# Patient Record
Sex: FEMALE | Race: WHITE | NOT HISPANIC OR LATINO | Employment: OTHER | URBAN - METROPOLITAN AREA
[De-identification: names, ages, dates, MRNs, and addresses within clinical notes are randomized per-mention and may not be internally consistent; named-entity substitution may affect disease eponyms.]

---

## 2017-03-03 ENCOUNTER — TELEPHONE (OUTPATIENT)
Dept: RHEUMATOLOGY | Facility: PHYSICIAN GROUP | Age: 82
End: 2017-03-03

## 2017-03-03 DIAGNOSIS — M35.3 POLYMYALGIA RHEUMATICA (HCC): ICD-10-CM

## 2017-03-03 DIAGNOSIS — E03.9 HYPOTHYROIDISM, UNSPECIFIED TYPE: ICD-10-CM

## 2017-03-03 NOTE — TELEPHONE ENCOUNTER
Pt called this am. She is is scheduled to see you on Monday however she is hurting and would like you to order labs so that she can have them done today, so you will have the results on Monday. She is requesting and ESR and whatever else you would like.    Thank you in advance.

## 2017-03-04 ENCOUNTER — HOSPITAL ENCOUNTER (OUTPATIENT)
Dept: LAB | Facility: MEDICAL CENTER | Age: 82
End: 2017-03-04
Attending: INTERNAL MEDICINE
Payer: MEDICARE

## 2017-03-04 DIAGNOSIS — M35.3 POLYMYALGIA RHEUMATICA (HCC): ICD-10-CM

## 2017-03-04 DIAGNOSIS — E03.9 HYPOTHYROIDISM, UNSPECIFIED TYPE: ICD-10-CM

## 2017-03-04 LAB
ALBUMIN SERPL BCP-MCNC: 4 G/DL (ref 3.2–4.9)
ALBUMIN SERPL BCP-MCNC: 4.2 G/DL (ref 3.2–4.9)
ALBUMIN/GLOB SERPL: 1.3 G/DL
ALP SERPL-CCNC: 92 U/L (ref 30–99)
ALP SERPL-CCNC: 93 U/L (ref 30–99)
ALT SERPL-CCNC: 16 U/L (ref 2–50)
ALT SERPL-CCNC: 17 U/L (ref 2–50)
AMYLASE SERPL-CCNC: 51 U/L (ref 20–103)
ANION GAP SERPL CALC-SCNC: 7 MMOL/L (ref 0–11.9)
AST SERPL-CCNC: 18 U/L (ref 12–45)
AST SERPL-CCNC: 20 U/L (ref 12–45)
BASOPHILS # BLD AUTO: 0.05 K/UL (ref 0–0.12)
BASOPHILS # BLD AUTO: 0.06 K/UL (ref 0–0.12)
BASOPHILS NFR BLD AUTO: 1.3 % (ref 0–1.8)
BASOPHILS NFR BLD AUTO: 1.5 % (ref 0–1.8)
BILIRUB CONJ SERPL-MCNC: <0.1 MG/DL (ref 0.1–0.5)
BILIRUB INDIRECT SERPL-MCNC: NORMAL MG/DL (ref 0–1)
BILIRUB SERPL-MCNC: 0.4 MG/DL (ref 0.1–1.5)
BILIRUB SERPL-MCNC: 0.4 MG/DL (ref 0.1–1.5)
BUN SERPL-MCNC: 20 MG/DL (ref 8–22)
CALCIUM SERPL-MCNC: 9.9 MG/DL (ref 8.5–10.5)
CHLORIDE SERPL-SCNC: 103 MMOL/L (ref 96–112)
CO2 SERPL-SCNC: 25 MMOL/L (ref 20–33)
CREAT SERPL-MCNC: 0.84 MG/DL (ref 0.5–1.4)
EOSINOPHIL # BLD: 0.12 K/UL (ref 0–0.51)
EOSINOPHIL # BLD: 0.14 K/UL (ref 0–0.51)
EOSINOPHIL NFR BLD AUTO: 3 % (ref 0–6.9)
EOSINOPHIL NFR BLD AUTO: 3.6 % (ref 0–6.9)
ERYTHROCYTE [DISTWIDTH] IN BLOOD BY AUTOMATED COUNT: 45.1 FL (ref 35.9–50)
ERYTHROCYTE [DISTWIDTH] IN BLOOD BY AUTOMATED COUNT: 45.3 FL (ref 35.9–50)
ERYTHROCYTE [SEDIMENTATION RATE] IN BLOOD BY WESTERGREN METHOD: 13 MM/HOUR (ref 0–30)
GLOBULIN SER CALC-MCNC: 3.2 G/DL (ref 1.9–3.5)
GLUCOSE SERPL-MCNC: 80 MG/DL (ref 65–99)
HCT VFR BLD AUTO: 40.2 % (ref 37–47)
HCT VFR BLD AUTO: 40.4 % (ref 37–47)
HGB BLD-MCNC: 13.2 G/DL (ref 12–16)
HGB BLD-MCNC: 13.2 G/DL (ref 12–16)
IMM GRANULOCYTES # BLD AUTO: 0 K/UL (ref 0–0.11)
IMM GRANULOCYTES # BLD AUTO: 0.01 K/UL (ref 0–0.11)
IMM GRANULOCYTES NFR BLD AUTO: 0 % (ref 0–0.9)
IMM GRANULOCYTES NFR BLD AUTO: 0.3 % (ref 0–0.9)
LIPASE SERPL-CCNC: 35 U/L (ref 11–82)
LYMPHOCYTES # BLD: 1.23 K/UL (ref 1–4.8)
LYMPHOCYTES # BLD: 1.34 K/UL (ref 1–4.8)
LYMPHOCYTES NFR BLD AUTO: 31.8 % (ref 22–41)
LYMPHOCYTES NFR BLD AUTO: 34 % (ref 22–41)
MCH RBC QN AUTO: 30.6 PG (ref 27–33)
MCH RBC QN AUTO: 30.6 PG (ref 27–33)
MCHC RBC AUTO-ENTMCNC: 32.7 G/DL (ref 33.6–35)
MCHC RBC AUTO-ENTMCNC: 32.8 G/DL (ref 33.6–35)
MCV RBC AUTO: 93.1 FL (ref 81.4–97.8)
MCV RBC AUTO: 93.7 FL (ref 81.4–97.8)
MONOCYTES # BLD: 0.29 K/UL (ref 0–0.85)
MONOCYTES # BLD: 0.31 K/UL (ref 0–0.85)
MONOCYTES NFR BLD AUTO: 7.5 % (ref 0–13.4)
MONOCYTES NFR BLD AUTO: 7.9 % (ref 0–13.4)
NEUTROPHILS # BLD: 2.1 K/UL (ref 2–7.15)
NEUTROPHILS # BLD: 2.16 K/UL (ref 2–7.15)
NEUTROPHILS NFR BLD AUTO: 53.3 % (ref 44–72)
NEUTROPHILS NFR BLD AUTO: 55.8 % (ref 44–72)
NRBC # BLD AUTO: 0 K/UL
NRBC # BLD AUTO: 0 K/UL
NRBC BLD-RTO: 0 /100 WBC
NRBC BLD-RTO: 0 /100 WBC
PLATELET # BLD AUTO: 311 K/UL (ref 164–446)
PLATELET # BLD AUTO: 312 K/UL (ref 164–446)
PMV BLD AUTO: 10 FL (ref 9–12.9)
PMV BLD AUTO: 10.4 FL (ref 9–12.9)
POTASSIUM SERPL-SCNC: 4.3 MMOL/L (ref 3.6–5.5)
PROT SERPL-MCNC: 7.4 G/DL (ref 6–8.2)
PROT SERPL-MCNC: 7.6 G/DL (ref 6–8.2)
RBC # BLD AUTO: 4.31 M/UL (ref 4.2–5.4)
RBC # BLD AUTO: 4.32 M/UL (ref 4.2–5.4)
SODIUM SERPL-SCNC: 135 MMOL/L (ref 135–145)
TSH SERPL DL<=0.005 MIU/L-ACNC: 0.97 UIU/ML (ref 0.3–3.7)
URATE SERPL-MCNC: 3.7 MG/DL (ref 1.9–8.2)
WBC # BLD AUTO: 3.9 K/UL (ref 4.8–10.8)
WBC # BLD AUTO: 3.9 K/UL (ref 4.8–10.8)

## 2017-03-04 PROCEDURE — 36415 COLL VENOUS BLD VENIPUNCTURE: CPT

## 2017-03-04 PROCEDURE — 80076 HEPATIC FUNCTION PANEL: CPT

## 2017-03-04 PROCEDURE — 84550 ASSAY OF BLOOD/URIC ACID: CPT

## 2017-03-04 PROCEDURE — 82784 ASSAY IGA/IGD/IGG/IGM EACH: CPT

## 2017-03-04 PROCEDURE — 80053 COMPREHEN METABOLIC PANEL: CPT

## 2017-03-04 PROCEDURE — 83690 ASSAY OF LIPASE: CPT

## 2017-03-04 PROCEDURE — 85025 COMPLETE CBC W/AUTO DIFF WBC: CPT | Mod: 91

## 2017-03-04 PROCEDURE — 85025 COMPLETE CBC W/AUTO DIFF WBC: CPT

## 2017-03-04 PROCEDURE — 84443 ASSAY THYROID STIM HORMONE: CPT

## 2017-03-04 PROCEDURE — 85652 RBC SED RATE AUTOMATED: CPT

## 2017-03-04 PROCEDURE — 83516 IMMUNOASSAY NONANTIBODY: CPT

## 2017-03-04 PROCEDURE — 82150 ASSAY OF AMYLASE: CPT

## 2017-03-06 ENCOUNTER — OFFICE VISIT (OUTPATIENT)
Dept: RHEUMATOLOGY | Facility: PHYSICIAN GROUP | Age: 82
End: 2017-03-06
Payer: MEDICARE

## 2017-03-06 VITALS
HEART RATE: 88 BPM | BODY MASS INDEX: 20.83 KG/M2 | TEMPERATURE: 98.6 F | SYSTOLIC BLOOD PRESSURE: 122 MMHG | OXYGEN SATURATION: 96 % | DIASTOLIC BLOOD PRESSURE: 80 MMHG | RESPIRATION RATE: 14 BRPM | WEIGHT: 133 LBS

## 2017-03-06 DIAGNOSIS — G89.29 CHRONIC BILATERAL LOW BACK PAIN WITHOUT SCIATICA: ICD-10-CM

## 2017-03-06 DIAGNOSIS — M54.2 CERVICAL SPINE PAIN: ICD-10-CM

## 2017-03-06 DIAGNOSIS — M85.80 OSTEOPENIA: ICD-10-CM

## 2017-03-06 DIAGNOSIS — M54.50 CHRONIC BILATERAL LOW BACK PAIN WITHOUT SCIATICA: ICD-10-CM

## 2017-03-06 DIAGNOSIS — E03.9 HYPOTHYROIDISM, UNSPECIFIED TYPE: ICD-10-CM

## 2017-03-06 DIAGNOSIS — M15.9 PRIMARY OSTEOARTHRITIS INVOLVING MULTIPLE JOINTS: ICD-10-CM

## 2017-03-06 LAB
IGA SERPL-MCNC: 96 MG/DL (ref 68–408)
TTG IGA SER IA-ACNC: 0 U/ML (ref 0–3)

## 2017-03-06 PROCEDURE — G8484 FLU IMMUNIZE NO ADMIN: HCPCS | Performed by: INTERNAL MEDICINE

## 2017-03-06 PROCEDURE — 1101F PT FALLS ASSESS-DOCD LE1/YR: CPT | Mod: 8P | Performed by: INTERNAL MEDICINE

## 2017-03-06 PROCEDURE — G8432 DEP SCR NOT DOC, RNG: HCPCS | Performed by: INTERNAL MEDICINE

## 2017-03-06 PROCEDURE — 4040F PNEUMOC VAC/ADMIN/RCVD: CPT | Mod: 8P | Performed by: INTERNAL MEDICINE

## 2017-03-06 PROCEDURE — 99214 OFFICE O/P EST MOD 30 MIN: CPT | Performed by: INTERNAL MEDICINE

## 2017-03-06 PROCEDURE — 1036F TOBACCO NON-USER: CPT | Performed by: INTERNAL MEDICINE

## 2017-03-06 PROCEDURE — G8419 CALC BMI OUT NRM PARAM NOF/U: HCPCS | Performed by: INTERNAL MEDICINE

## 2017-03-06 RX ORDER — CELECOXIB 100 MG/1
CAPSULE ORAL
Qty: 180 CAP | Refills: 0 | Status: SHIPPED | OUTPATIENT
Start: 2017-03-06 | End: 2017-05-23

## 2017-03-06 NOTE — Clinical Note
[facility]   [address], [address]  [place] [address]  Phone: [phone]  Fax: [phone]              Encounter Date: 3/6/2017    Dear Dr. Nobles ref. provider found,    It was a pleasure seeing your patient, Susan Menendez, on 3/6/2017. Diagnoses of Primary osteoarthritis involving multiple joints, Cervical spine pain, Chronic bilateral low back pain without sciatica, Hypothyroidism, unspecified type, and Osteopenia were pertinent to this visit.     Please find attached progress note which includes the history I obtained from Ms. Menendez, my physical examination findings, my impression and recommendations.      Once again, it was a pleasure participating in your patient's care.  Please feel free to contact me if you have any questions or if I can be of any further assistance to your patients.      Sincerely,    Zena Benavides M.D.  Electronically Signed          PROGRESS NOTE:  No notes on file

## 2017-03-06 NOTE — MR AVS SNAPSHOT
Susan FROYLAN Menendez   3/6/2017 10:00 AM   Office Visit   MRN: [id number]    Department:  Rheumatology Med [facility]   Dept Phone:  [phone]    Description:  Female : 1935   Provider:  Zena Benavides M.D.           Reason for Visit     Follow-Up           Allergies as of 3/6/2017     Allergen Noted Reactions    Amoxicillin 2012       Unknown reaction    Augmentin 2011       Stomach ache      Cipro Xr 11/15/2013       Levaquin 2011       Doesn't work      Morphine 2012       Pcn [Penicillins] 2011       unknown    Sulfa Drugs 2011         You were diagnosed with     Primary osteoarthritis involving multiple joints   [3061813]       Cervical spine pain   [339948]       Chronic bilateral low back pain without sciatica   [8655596]       Hypothyroidism, unspecified type   [5243895]       Osteopenia   [220496]         Vital Signs     Blood Pressure Pulse Temperature Respirations Weight Oxygen Saturation    122/80 mmHg 88 37 °C (98.6 °F) 14 60.328 kg (133 lb) 96%    Smoking Status                   Former Smoker           Basic Information     Date Of Birth Sex Race Ethnicity Preferred Language    1935 Female White Non- English      Your appointments     2017  9:15 AM   Follow Up Visit with Zena Benavides M.D.   [facility] (--)    [address] [place] [address] [id number] [phone]           You will be receiving a confirmation call a few days before your appointment from our automated call confirmation system.              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   2014 - Present    Diarrhea R19.7   2014 - Present    Hepatitis K75.9   2014 - Present    Hearing loss H91.90   2014 - Present    Varicose veins I86.8   2014 - Present      Health Maintenance        Date  Due Completion Dates    IMM DTaP/Tdap/Td Vaccine (1 - Tdap) 9/5/1956 ---    PAP SMEAR 9/5/1958 ---    COLONOSCOPY 9/5/1987 ---    IMM ZOSTER VACCINE 9/5/1997 ---    IMM PNEUMOCOCCAL 65+ (ADULT) LOW/MEDIUM RISK SERIES (1 of 2 - PCV13) 9/5/2002 ---    IMM INFLUENZA (1) 9/1/2016 ---    MAMMOGRAM 11/28/2017 11/28/2016, 11/24/2015, 11/4/2014, 8/14/2013, 3/16/2012, 5/5/2009, 5/5/2009, 3/24/2009, 1/22/2008, 1/22/2008, 12/26/2006, 11/21/2005, 11/19/2004    BONE DENSITY 11/8/2021 11/8/2016, 11/4/2014, 8/14/2013, 3/19/2012, 3/15/2004            Current Immunizations     No immunizations on file.      Below and/or attached are the medications your provider expects you to take. Review all of your home medications and newly ordered medications with your provider and/or pharmacist. Follow medication instructions as directed by your provider and/or pharmacist. Please keep your medication list with you and share with your provider. Update the information when medications are discontinued, doses are changed, or new medications (including over-the-counter products) are added; and carry medication information at all times in the event of emergency situations     Allergies:  AMOXICILLIN - (reactions not documented)     AUGMENTIN - (reactions not documented)     CIPRO XR - (reactions not documented)     LEVAQUIN - (reactions not documented)     MORPHINE - (reactions not documented)     PCN - (reactions not documented)     SULFA DRUGS - (reactions not documented)               Medications  Valid as of: March 06, 2017 - 10:30 AM    Generic Name Brand Name Tablet Size Instructions for use    B Complex Vitamins   Take  by mouth every day.        Carbamide Peroxide (Solution) DEBROX 6.5 % Place 6 Drops in left ear 2 times a day. Administer drops in both ears.        Celecoxib (Cap) CELEBREX 100 MG 1 tab po qday to bid prn joint pain        Cholecalciferol   Take  by mouth every day.        Denosumab (Solution) PROLIA 60 MG/ML Inject 60 mg as  instructed Once. Every 6 months   Indications: Osteoporosis        DULoxetine HCl (Cap DR Particles) CYMBALTA 60 MG Take 60 mg by mouth every day.        Estradiol   Insert  in vagina.        Glutathione   50 mg by Does not apply route 2 Times a Day.        Hyaluronic Acid-Vitamin C   Take  by mouth every day.        Ketoconazole (Tab) NIZORAL 200 MG Take 1 Tab by mouth every day.        Lysine   Take  by mouth every day.        Methenamine Hippurate (Tab) HIPPREX 1 GM Take 1 g by mouth 4 times a day.        Milk Thistle   Take  by mouth every day.        Multiple Vitamins-Minerals   Take  by mouth every day.        Multiple Vitamins-Minerals   Take  by mouth every day.        Nutritional Supplements   Take  by mouth every day.        Pentosan Polysulfate Sodium (Cap) ELMIRON 100 MG Take 100 mg by mouth 3 times a day before meals.        Probiotic Product (Chew Tab) REZYST IM  Take 1 Tab by mouth every day.        Thyroid (Tab) ARMOUR THYROID 30 MG Take 30 mg by mouth every day.        TraZODone HCl (Tab) DESYREL 100 MG Take 100 mg by mouth every evening.        ValACYclovir HCl (Tab) VALTREX 500 MG Take 500 mg by mouth 2 times a day.        .                 Medicines prescribed today were sent to:     [id number] - [place] - [address]    [address] [place] [address] [place] [address]    Phone: [phone] Fax: [phone]    Open 24 Hours?: No    [facility] - [place], [place] - [address]    [address] [facility] [address]    Phone: [phone] Fax: [phone]    Open 24 Hours?: No      Medication refill instructions:       If your prescription bottle indicates you have medication refills left, it is not necessary to call your provider’s office. Please contact your pharmacy and they will refill your medication.    If your prescription bottle indicates you do not have any refills left, you may request refills at any time through one of the following ways: The  online TrialReach system (except Urgent Care), by calling your provider’s office, or by asking your pharmacy to contact your provider’s office with a refill request. Medication refills are processed only during regular business hours and may not be available until the next business day. Your provider may request additional information or to have a follow-up visit with you prior to refilling your medication.   *Please Note: Medication refills are assigned a new Rx number when refilled electronically. Your pharmacy may indicate that no refills were authorized even though a new prescription for the same medication is available at the pharmacy. Please request the medicine by name with the pharmacy before contacting your provider for a refill.           TrialReach Access Code: [email or web address]  Expires: 4/3/2017  9:15 AM    TrialReach  A secure, online tool to manage your health information     [employer]’s TrialReach® is a secure, online tool that connects you to your personalized health information from the privacy of your home -- day or night - making it very easy for you to manage your healthcare. Once the activation process is completed, you can even access your medical information using the TrialReach mati, which is available for free in the Apple Mati store or Google Play store.     TrialReach provides the following levels of access (as shown below):   My Chart Features   [facility] Doctor [facility]  Specialists [facility]  Urgent  Care Non-[facility]  Primary Care  Doctor   Email your healthcare team securely and privately 24/7 X X X    Manage appointments: schedule your next appointment; view details of past/upcoming appointments X      Request prescription refills. X      View recent personal medical records, including lab and immunizations X X X X   View health record, including health history, allergies, medications X X X X   Read reports about your outpatient visits, procedures, consult and ER notes X X X X   See your  discharge summary, which is a recap of your hospital and/or ER visit that includes your diagnosis, lab results, and care plan. X X       How to register for Code Kingdoms:  1. Go to  [email or web address].  2. Click on the Sign Up Now box, which takes you to the New Member Sign Up page. You will need to provide the following information:  a. Enter your Code Kingdoms Access Code exactly as it appears at the top of this page. (You will not need to use this code after you’ve completed the sign-up process. If you do not sign up before the expiration date, you must request a new code.)   b. Enter your date of birth.   c. Enter your home email address.   d. Click Submit, and follow the next screen’s instructions.  3. Create a Code Kingdoms ID. This will be your Code Kingdoms login ID and cannot be changed, so think of one that is secure and easy to remember.  4. Create a Code Kingdoms password. You can change your password at any time.  5. Enter your Password Reset Question and Answer. This can be used at a later time if you forget your password.   6. Enter your e-mail address. This allows you to receive e-mail notifications when new information is available in Code Kingdoms.  7. Click Sign Up. You can now view your health information.    For assistance activating your Code Kingdoms account, call [phone]
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
## 2017-04-06 ENCOUNTER — HOSPITAL ENCOUNTER (OUTPATIENT)
Dept: LAB | Facility: MEDICAL CENTER | Age: 82
End: 2017-04-06
Attending: FAMILY MEDICINE
Payer: MEDICARE

## 2017-04-06 LAB
T3 SERPL-MCNC: 176.2 NG/DL (ref 60–181)
T4 SERPL-MCNC: 5.5 UG/DL (ref 4–12)
TSH SERPL DL<=0.005 MIU/L-ACNC: 2.21 UIU/ML (ref 0.3–3.7)

## 2017-04-06 PROCEDURE — 84443 ASSAY THYROID STIM HORMONE: CPT

## 2017-04-06 PROCEDURE — 36415 COLL VENOUS BLD VENIPUNCTURE: CPT

## 2017-04-06 PROCEDURE — 84480 ASSAY TRIIODOTHYRONINE (T3): CPT

## 2017-04-06 PROCEDURE — 84436 ASSAY OF TOTAL THYROXINE: CPT

## 2017-05-09 ENCOUNTER — HOSPITAL ENCOUNTER (OUTPATIENT)
Facility: MEDICAL CENTER | Age: 82
End: 2017-05-09
Attending: FAMILY MEDICINE
Payer: MEDICARE

## 2017-05-09 ENCOUNTER — OFFICE VISIT (OUTPATIENT)
Dept: URGENT CARE | Facility: PHYSICIAN GROUP | Age: 82
End: 2017-05-09
Payer: MEDICARE

## 2017-05-09 VITALS
SYSTOLIC BLOOD PRESSURE: 120 MMHG | RESPIRATION RATE: 18 BRPM | DIASTOLIC BLOOD PRESSURE: 70 MMHG | HEART RATE: 100 BPM | OXYGEN SATURATION: 94 % | TEMPERATURE: 99.3 F

## 2017-05-09 DIAGNOSIS — R30.0 DYSURIA: ICD-10-CM

## 2017-05-09 LAB
APPEARANCE UR: CLEAR
BILIRUB UR STRIP-MCNC: NORMAL MG/DL
COLOR UR AUTO: YELLOW
GLUCOSE UR STRIP.AUTO-MCNC: NORMAL MG/DL
KETONES UR STRIP.AUTO-MCNC: NORMAL MG/DL
LEUKOCYTE ESTERASE UR QL STRIP.AUTO: NORMAL
NITRITE UR QL STRIP.AUTO: NORMAL
PH UR STRIP.AUTO: 5 [PH] (ref 5–8)
PROT UR QL STRIP: NORMAL MG/DL
RBC UR QL AUTO: NORMAL
SP GR UR STRIP.AUTO: 1.01
UROBILINOGEN UR STRIP-MCNC: NORMAL MG/DL

## 2017-05-09 PROCEDURE — 4040F PNEUMOC VAC/ADMIN/RCVD: CPT | Mod: 8P | Performed by: FAMILY MEDICINE

## 2017-05-09 PROCEDURE — G8432 DEP SCR NOT DOC, RNG: HCPCS | Performed by: FAMILY MEDICINE

## 2017-05-09 PROCEDURE — 1101F PT FALLS ASSESS-DOCD LE1/YR: CPT | Mod: 8P | Performed by: FAMILY MEDICINE

## 2017-05-09 PROCEDURE — G8420 CALC BMI NORM PARAMETERS: HCPCS | Performed by: FAMILY MEDICINE

## 2017-05-09 PROCEDURE — 99214 OFFICE O/P EST MOD 30 MIN: CPT | Performed by: FAMILY MEDICINE

## 2017-05-09 PROCEDURE — 1036F TOBACCO NON-USER: CPT | Performed by: FAMILY MEDICINE

## 2017-05-09 PROCEDURE — 87086 URINE CULTURE/COLONY COUNT: CPT

## 2017-05-09 PROCEDURE — 81002 URINALYSIS NONAUTO W/O SCOPE: CPT | Performed by: FAMILY MEDICINE

## 2017-05-09 RX ORDER — CEFUROXIME AXETIL 250 MG/1
250 TABLET ORAL 2 TIMES DAILY
COMMUNITY
End: 2017-05-09

## 2017-05-09 RX ORDER — CEFUROXIME AXETIL 250 MG/1
250 TABLET ORAL 2 TIMES DAILY
Qty: 10 TAB | Refills: 0 | Status: SHIPPED | OUTPATIENT
Start: 2017-05-09 | End: 2017-05-14

## 2017-05-09 RX ORDER — PHENAZOPYRIDINE HYDROCHLORIDE 100 MG/1
100 TABLET, FILM COATED ORAL 3 TIMES DAILY PRN
Qty: 6 TAB | Refills: 0 | Status: ON HOLD | OUTPATIENT
Start: 2017-05-09 | End: 2018-02-12

## 2017-05-09 ASSESSMENT — ENCOUNTER SYMPTOMS
ORTHOPNEA: 0
FEVER: 0
DIZZINESS: 0
VOMITING: 0
ABDOMINAL PAIN: 0
FOCAL WEAKNESS: 0
NAUSEA: 0
CHILLS: 0

## 2017-05-09 NOTE — MR AVS SNAPSHOT
Susan Menendez   2017 5:00 PM   Office Visit   MRN: [id number]    Department:  [facility]   Dept Phone:  [phone]    Description:  Female : 1935   Provider:  Cesar Valadez M.D.           Reason for Visit     UTI x1wk/ pain when urinating/ frequency/ lower back pain      Allergies as of 2017     Allergen Noted Reactions    Amoxicillin 2012       Unknown reaction    Augmentin 2011       Stomach ache      Cipro Xr 11/15/2013       Levaquin 2011       Doesn't work      Morphine 2012       Pcn [Penicillins] 2011       unknown    Sulfa Drugs 2011         You were diagnosed with     Dysuria   [788.1.ICD-9-CM]         Vital Signs     Blood Pressure Pulse Temperature Respirations Oxygen Saturation Smoking Status    120/70 mmHg 100 37.4 °C (99.3 °F) 18 94% Former Smoker      Basic Information     Date Of Birth Sex Race Ethnicity Preferred Language    1935 Female White Non- English      Your appointments     2017  9:15 AM   Follow Up Visit with Zena Benavides M.D.   [facility] (--)    [address], [address]  [facility] [place] [address] [phone]           You will be receiving a confirmation call a few days before your appointment from our automated call confirmation system.              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   2014 - Present    Diarrhea R19.7   2014 - Present    Hepatitis K75.9   2014 - Present    Hearing loss H91.90   2014 - Present    Varicose veins I86.8   2014 - Present      Health Maintenance        Date Due Completion Dates    IMM DTaP/Tdap/Td Vaccine (1 - Tdap) 1956 ---    PAP SMEAR 1958 ---    COLONOSCOPY 1987 ---    IMM ZOSTER VACCINE 1997 ---    IMM PNEUMOCOCCAL 65+ (ADULT) LOW/MEDIUM RISK SERIES (1 of 2  - PCV13) 9/5/2002 ---    MAMMOGRAM 11/28/2017 11/28/2016, 11/24/2015, 11/4/2014, 8/14/2013, 3/16/2012, 5/5/2009, 5/5/2009, 3/24/2009, 1/22/2008, 1/22/2008, 12/26/2006, 11/21/2005, 11/19/2004    BONE DENSITY 11/8/2021 11/8/2016, 11/4/2014, 8/14/2013, 3/19/2012, 3/15/2004            Current Immunizations     No immunizations on file.      Below and/or attached are the medications your provider expects you to take. Review all of your home medications and newly ordered medications with your provider and/or pharmacist. Follow medication instructions as directed by your provider and/or pharmacist. Please keep your medication list with you and share with your provider. Update the information when medications are discontinued, doses are changed, or new medications (including over-the-counter products) are added; and carry medication information at all times in the event of emergency situations     Allergies:  AMOXICILLIN - (reactions not documented)     AUGMENTIN - (reactions not documented)     CIPRO XR - (reactions not documented)     LEVAQUIN - (reactions not documented)     MORPHINE - (reactions not documented)     PCN - (reactions not documented)     SULFA DRUGS - (reactions not documented)               Medications  Valid as of: May 09, 2017 -  5:27 PM    Generic Name Brand Name Tablet Size Instructions for use    B Complex Vitamins   Take  by mouth every day.        Carbamide Peroxide (Solution) DEBROX 6.5 % Place 6 Drops in left ear 2 times a day. Administer drops in both ears.        Cefuroxime Axetil (Tab) CEFTIN 250 MG Take 1 Tab by mouth 2 times a day for 5 days.        Celecoxib (Cap) CELEBREX 100 MG 1 tab po qday to bid prn joint pain        Cholecalciferol   Take  by mouth every day.        Denosumab (Solution) PROLIA 60 MG/ML Inject 60 mg as instructed Once. Every 6 months   Indications: Osteoporosis        DULoxetine HCl (Cap DR Particles) CYMBALTA 60 MG Take 60 mg by mouth every day.        Estradiol    Insert  in vagina.        Glutathione   50 mg by Does not apply route 2 Times a Day.        Hyaluronic Acid-Vitamin C   Take  by mouth every day.        Ketoconazole (Tab) NIZORAL 200 MG Take 1 Tab by mouth every day.        Lysine   Take  by mouth every day.        Methenamine Hippurate (Tab) HIPPREX 1 GM Take 1 g by mouth 4 times a day.        Milk Thistle   Take  by mouth every day.        Multiple Vitamins-Minerals   Take  by mouth every day.        Multiple Vitamins-Minerals   Take  by mouth every day.        Nutritional Supplements   Take  by mouth every day.        Pentosan Polysulfate Sodium (Cap) ELMIRON 100 MG Take 100 mg by mouth 3 times a day before meals.        Phenazopyridine HCl (Tab) PYRIDIUM 100 MG Take 1 Tab by mouth 3 times a day as needed.        Probiotic Product (Chew Tab) REZYST IM  Take 1 Tab by mouth every day.        Thyroid (Tab) ARMOUR THYROID 30 MG Take 30 mg by mouth every day.        TraZODone HCl (Tab) DESYREL 100 MG Take 100 mg by mouth every evening.        ValACYclovir HCl (Tab) VALTREX 500 MG Take 500 mg by mouth 2 times a day.        .                 Medicines prescribed today were sent to:     [facility]/PHARMACY #[id number] - [place] - [address]    [address] [place] [address]    Phone: [phone] Fax: [phone]    Open 24 Hours?: No    [facility] - [place], [place] - [address]    [address] [facility] [address]    Phone: [phone] Fax: [phone]    Open 24 Hours?: No      Medication refill instructions:       If your prescription bottle indicates you have medication refills left, it is not necessary to call your provider’s office. Please contact your pharmacy and they will refill your medication.    If your prescription bottle indicates you do not have any refills left, you may request refills at any time through one of the following ways: The online [employer] system (except Urgent Care), by calling your provider’s  office, or by asking your pharmacy to contact your provider’s office with a refill request. Medication refills are processed only during regular business hours and may not be available until the next business day. Your provider may request additional information or to have a follow-up visit with you prior to refilling your medication.   *Please Note: Medication refills are assigned a new Rx number when refilled electronically. Your pharmacy may indicate that no refills were authorized even though a new prescription for the same medication is available at the pharmacy. Please request the medicine by name with the pharmacy before contacting your provider for a refill.        Your To Do List     Future Labs/Procedures Complete By Expires    URINE CULTURE(NEW)  As directed 5/9/2018         [employer] Access Code: Activation code not generated  Current [employer] Status: Active

## 2017-05-10 DIAGNOSIS — R30.0 DYSURIA: ICD-10-CM

## 2017-05-10 NOTE — PROGRESS NOTES
Subjective:      uSsan Menendez is a 81 y.o. female who presents with UTI    Chief Complaint   Patient presents with   • UTI     x1wk/ pain when urinating/ frequency/ lower back pain        - This is a very pleasant 81 y.o. female with complaints of urinary req burning x 1 week. No NVFC/cp/sob           ALLERGIES:  Amoxicillin; Augmentin; Cipro xr; Levaquin; Morphine; Pcn; and Sulfa drugs     PMH:  Past Medical History   Diagnosis Date   • Hypothyroid    • Chronic UTI    • Arthritis    • Other specified disorder of intestines    • Urinary bladder disorder    • Pain    • Anesthesia      nausea   • Depression    • Chronic diarrhea    • Varicose veins    • History of cataract surgery      left   • Interstitial cystitis         MEDS:    Current outpatient prescriptions:   •  phenazopyridine (PYRIDIUM) 100 MG Tab, Take 1 Tab by mouth 3 times a day as needed., Disp: 6 Tab, Rfl: 0  •  cefUROXime (CEFTIN) 250 MG Tab, Take 1 Tab by mouth 2 times a day for 5 days., Disp: 10 Tab, Rfl: 0  •  celecoxib (CELEBREX) 100 MG Cap, 1 tab po qday to bid prn joint pain, Disp: 180 Cap, Rfl: 0  •  methenamine hip (HIPPREX) 1 GM Tab, Take 1 g by mouth 4 times a day., Disp: , Rfl:   •  thyroid (ARMOUR THYROID) 30 MG Tab, Take 30 mg by mouth every day., Disp: , Rfl:   •  carbamide peroxide (CARBAMOXIDE EAR DROPS) 6.5 % Solution, Place 6 Drops in left ear 2 times a day. Administer drops in both ears., Disp: 1 Bottle, Rfl: 0  •  ketoconazole (NIZORAL) 200 MG TABS, Take 1 Tab by mouth every day., Disp: 20 Tab, Rfl: 0  •  denosumab (PROLIA) 60 MG/ML SOLN, Inject 60 mg as instructed Once. Every 6 months   Indications: Osteoporosis, Disp: , Rfl:   •  GLUTATHIONE, 50 mg by Does not apply route 2 Times a Day., Disp: , Rfl:   •  Probiotic Product (REZYST IM) CHEW, Take 1 Tab by mouth every day., Disp: 30 Tab, Rfl: 3  •  duloxetine (CYMBALTA) 60 MG CPEP, Take 60 mg by mouth every day., Disp: , Rfl:   •  trazodone (DESYREL) 100 MG TABS, Take 100 mg  by mouth every evening., Disp: , Rfl:   •  Cholecalciferol (VITAMIN D PO), Take  by mouth every day., Disp: , Rfl:   •  Nutritional Supplements (DHEA PO), Take  by mouth every day., Disp: , Rfl:   •  Multiple Vitamins-Minerals (EYE VITAMINS PO), Take  by mouth every day., Disp: , Rfl:   •  B Complex Vitamins (VITAMIN B COMPLEX PO), Take  by mouth every day., Disp: , Rfl:   •  MILK THISTLE PO, Take  by mouth every day., Disp: , Rfl:   •  LYSINE PO, Take  by mouth every day., Disp: , Rfl:   •  Hyaluronic Acid-Vitamin C (HYALURONIC ACID PO), Take  by mouth every day., Disp: , Rfl:   •  Multiple Vitamins-Minerals (BONE SMART PO), Take  by mouth every day., Disp: , Rfl:   •  valacyclovir (VALTREX) 500 MG TABS, Take 500 mg by mouth 2 times a day., Disp: , Rfl:   •  pentosan (ELMIRON) 100 MG CAPS, Take 100 mg by mouth 3 times a day before meals., Disp: , Rfl:   •  Estradiol (VAGIFEM VA), Insert  in vagina., Disp: , Rfl:     ** Past medical, social, family and surgical history documented in HPI or under PMH/PSH/FH section, otherwise it is contributory **             HPI    Review of Systems   Constitutional: Negative for fever and chills.   Cardiovascular: Negative for chest pain and orthopnea.   Gastrointestinal: Negative for nausea, vomiting and abdominal pain.   Genitourinary: Positive for dysuria and frequency.   Neurological: Negative for dizziness and focal weakness.          Objective:     /70 mmHg  Pulse 100  Temp(Src) 37.4 °C (99.3 °F)  Resp 18  SpO2 94%     Physical Exam   Constitutional: She appears well-developed. No distress.   HENT:   Head: Normocephalic and atraumatic.   Eyes: Conjunctivae are normal.   Neck: Neck supple.   Cardiovascular: Regular rhythm.    No murmur heard.  Pulmonary/Chest: Effort normal.   Abdominal: Soft. There is no tenderness. There is no rebound and no guarding.   Neurological: She is alert. She exhibits normal muscle tone.   Skin: Skin is warm and dry.   Psychiatric: She has  a normal mood and affect. Judgment normal.   Nursing note and vitals reviewed.              Assessment/Plan:         1. UTI  phenazopyridine (PYRIDIUM) 100 MG Tab    URINE CULTURE(NEW)    cefUROXime (CEFTIN) 250 MG Tab             Dx & d/c instructions discussed w/ patient and/or family members. Follow up w/ Prvt Dr or here in 3-4 days if not getting better, sooner if needed,  ER if worse and UC/PCP unavailable.        Possible side effects (i.e. Rash, GI upset/constipation, sedation, elevation of BP or sugars) of any medications given discussed.

## 2017-05-12 LAB
BACTERIA UR CULT: NORMAL
SIGNIFICANT IND 70042: NORMAL
SOURCE SOURCE: NORMAL

## 2017-05-16 ENCOUNTER — TELEPHONE (OUTPATIENT)
Dept: RHEUMATOLOGY | Facility: PHYSICIAN GROUP | Age: 82
End: 2017-05-16

## 2017-05-16 DIAGNOSIS — R53.82 CHRONIC FATIGUE: ICD-10-CM

## 2017-05-16 DIAGNOSIS — M94.20 CHONDROMALACIA: ICD-10-CM

## 2017-05-16 NOTE — TELEPHONE ENCOUNTER
"I spoke with patient and she states she is not feeling \"Right\"  Increased fatigue w hx of auto immune Hep. She states she thinks her PMR is flaring.. She would like some lab work orders.   Thank you and please advise  "

## 2017-05-16 NOTE — TELEPHONE ENCOUNTER
Spoke with patient and relayed your message. I also scheduled her for an appt next week.   Thank you

## 2017-05-19 ENCOUNTER — HOSPITAL ENCOUNTER (OUTPATIENT)
Dept: LAB | Facility: MEDICAL CENTER | Age: 82
End: 2017-05-19
Attending: INTERNAL MEDICINE
Payer: MEDICARE

## 2017-05-19 DIAGNOSIS — R53.82 CHRONIC FATIGUE: ICD-10-CM

## 2017-05-19 DIAGNOSIS — M94.20 CHONDROMALACIA: ICD-10-CM

## 2017-05-19 LAB
ALBUMIN SERPL BCP-MCNC: 4.1 G/DL (ref 3.2–4.9)
ALBUMIN/GLOB SERPL: 1.4 G/DL
ALP SERPL-CCNC: 91 U/L (ref 30–99)
ALT SERPL-CCNC: 18 U/L (ref 2–50)
ANION GAP SERPL CALC-SCNC: 5 MMOL/L (ref 0–11.9)
AST SERPL-CCNC: 22 U/L (ref 12–45)
BASOPHILS # BLD AUTO: 0.7 % (ref 0–1.8)
BASOPHILS # BLD: 0.05 K/UL (ref 0–0.12)
BILIRUB SERPL-MCNC: 0.3 MG/DL (ref 0.1–1.5)
BUN SERPL-MCNC: 19 MG/DL (ref 8–22)
CALCIUM SERPL-MCNC: 9.3 MG/DL (ref 8.5–10.5)
CHLORIDE SERPL-SCNC: 99 MMOL/L (ref 96–112)
CO2 SERPL-SCNC: 26 MMOL/L (ref 20–33)
CREAT SERPL-MCNC: 0.93 MG/DL (ref 0.5–1.4)
EOSINOPHIL # BLD AUTO: 0.08 K/UL (ref 0–0.51)
EOSINOPHIL NFR BLD: 1.2 % (ref 0–6.9)
ERYTHROCYTE [DISTWIDTH] IN BLOOD BY AUTOMATED COUNT: 46.1 FL (ref 35.9–50)
ERYTHROCYTE [SEDIMENTATION RATE] IN BLOOD BY WESTERGREN METHOD: 18 MM/HOUR (ref 0–30)
GFR SERPL CREATININE-BSD FRML MDRD: 58 ML/MIN/1.73 M 2
GLOBULIN SER CALC-MCNC: 3 G/DL (ref 1.9–3.5)
GLUCOSE SERPL-MCNC: 89 MG/DL (ref 65–99)
HCT VFR BLD AUTO: 35.5 % (ref 37–47)
HGB BLD-MCNC: 11.9 G/DL (ref 12–16)
IMM GRANULOCYTES # BLD AUTO: 0.01 K/UL (ref 0–0.11)
IMM GRANULOCYTES NFR BLD AUTO: 0.1 % (ref 0–0.9)
LYMPHOCYTES # BLD AUTO: 1.92 K/UL (ref 1–4.8)
LYMPHOCYTES NFR BLD: 28.8 % (ref 22–41)
MCH RBC QN AUTO: 30.4 PG (ref 27–33)
MCHC RBC AUTO-ENTMCNC: 33.5 G/DL (ref 33.6–35)
MCV RBC AUTO: 90.6 FL (ref 81.4–97.8)
MONOCYTES # BLD AUTO: 0.54 K/UL (ref 0–0.85)
MONOCYTES NFR BLD AUTO: 8.1 % (ref 0–13.4)
NEUTROPHILS # BLD AUTO: 4.07 K/UL (ref 2–7.15)
NEUTROPHILS NFR BLD: 61.1 % (ref 44–72)
NRBC # BLD AUTO: 0 K/UL
NRBC BLD AUTO-RTO: 0 /100 WBC
PLATELET # BLD AUTO: 341 K/UL (ref 164–446)
PMV BLD AUTO: 10.1 FL (ref 9–12.9)
POTASSIUM SERPL-SCNC: 4.3 MMOL/L (ref 3.6–5.5)
PROT SERPL-MCNC: 7.1 G/DL (ref 6–8.2)
RBC # BLD AUTO: 3.92 M/UL (ref 4.2–5.4)
SODIUM SERPL-SCNC: 130 MMOL/L (ref 135–145)
WBC # BLD AUTO: 6.7 K/UL (ref 4.8–10.8)

## 2017-05-19 PROCEDURE — 36415 COLL VENOUS BLD VENIPUNCTURE: CPT

## 2017-05-19 PROCEDURE — 80053 COMPREHEN METABOLIC PANEL: CPT

## 2017-05-19 PROCEDURE — 85025 COMPLETE CBC W/AUTO DIFF WBC: CPT

## 2017-05-19 PROCEDURE — 85652 RBC SED RATE AUTOMATED: CPT

## 2017-05-23 ENCOUNTER — OFFICE VISIT (OUTPATIENT)
Dept: RHEUMATOLOGY | Facility: PHYSICIAN GROUP | Age: 82
End: 2017-05-23
Payer: MEDICARE

## 2017-05-23 VITALS
HEART RATE: 78 BPM | BODY MASS INDEX: 20.98 KG/M2 | RESPIRATION RATE: 12 BRPM | WEIGHT: 134 LBS | DIASTOLIC BLOOD PRESSURE: 64 MMHG | TEMPERATURE: 99.3 F | SYSTOLIC BLOOD PRESSURE: 104 MMHG | OXYGEN SATURATION: 98 %

## 2017-05-23 DIAGNOSIS — M15.9 PRIMARY OSTEOARTHRITIS INVOLVING MULTIPLE JOINTS: ICD-10-CM

## 2017-05-23 DIAGNOSIS — M85.89 OSTEOPENIA OF MULTIPLE SITES: ICD-10-CM

## 2017-05-23 DIAGNOSIS — M54.2 CERVICALGIA: ICD-10-CM

## 2017-05-23 DIAGNOSIS — M79.7 FIBROMYALGIA: ICD-10-CM

## 2017-05-23 DIAGNOSIS — G89.29 CHRONIC BILATERAL LOW BACK PAIN WITHOUT SCIATICA: ICD-10-CM

## 2017-05-23 DIAGNOSIS — M54.50 CHRONIC BILATERAL LOW BACK PAIN WITHOUT SCIATICA: ICD-10-CM

## 2017-05-23 DIAGNOSIS — E03.9 HYPOTHYROIDISM, UNSPECIFIED TYPE: ICD-10-CM

## 2017-05-23 PROCEDURE — 4040F PNEUMOC VAC/ADMIN/RCVD: CPT | Mod: 8P | Performed by: INTERNAL MEDICINE

## 2017-05-23 PROCEDURE — 1101F PT FALLS ASSESS-DOCD LE1/YR: CPT | Mod: 8P | Performed by: INTERNAL MEDICINE

## 2017-05-23 PROCEDURE — G8432 DEP SCR NOT DOC, RNG: HCPCS | Performed by: INTERNAL MEDICINE

## 2017-05-23 PROCEDURE — 1036F TOBACCO NON-USER: CPT | Performed by: INTERNAL MEDICINE

## 2017-05-23 PROCEDURE — 99214 OFFICE O/P EST MOD 30 MIN: CPT | Performed by: INTERNAL MEDICINE

## 2017-05-23 PROCEDURE — G8420 CALC BMI NORM PARAMETERS: HCPCS | Performed by: INTERNAL MEDICINE

## 2017-05-23 RX ORDER — MELOXICAM 15 MG/1
TABLET ORAL
Qty: 90 TAB | Refills: 0 | Status: SHIPPED | OUTPATIENT
Start: 2017-05-23 | End: 2017-08-04 | Stop reason: SDUPTHER

## 2017-05-23 RX ORDER — GABAPENTIN 100 MG/1
CAPSULE ORAL
Qty: 90 CAP | Refills: 0 | Status: SHIPPED | OUTPATIENT
Start: 2017-05-23 | End: 2017-08-13 | Stop reason: SDUPTHER

## 2017-05-23 NOTE — MR AVS SNAPSHOT
Susan J Gemma   2017 9:15 AM   Office Visit   MRN: [id number]    Department:  [facility]   Dept Phone:  [phone]    Description:  Female : 1935   Provider:  Zena Benavides M.D.           Reason for Visit     Follow-Up follow up      Allergies as of 2017     Allergen Noted Reactions    Amoxicillin 2012       Unknown reaction    Augmentin 2011       Stomach ache      Cipro Xr 11/15/2013       Levaquin 2011       Doesn't work      Morphine 2012       Pcn [Penicillins] 2011       unknown    Sulfa Drugs 2011         You were diagnosed with     Primary osteoarthritis involving multiple joints   [5513789]       Osteopenia of multiple sites   [6651729]       Cervicalgia   [723.1.ICD-9-CM]       Chronic bilateral low back pain without sciatica   [8933145]       Hypothyroidism, unspecified type   [8816013]       Fibromyalgia   [051852]       Other specified forms of hearing loss, unspecified laterality   [4737609]         Vital Signs     Blood Pressure Pulse Temperature Respirations Weight Oxygen Saturation    104/64 mmHg 78 37.4 °C (99.3 °F) 12 60.782 kg (134 lb) 98%    Breastfeeding? Smoking Status                No Former Smoker          Basic Information     Date Of Birth Sex Race Ethnicity Preferred Language    1935 Female White Non- English      Your appointments     2017 11:15 AM   Follow Up Visit with Zena Benavides M.D.   [facility] (--)    [address] [place] [address], [address] [address] [phone]           You will be receiving a confirmation call a few days before your appointment from our automated call confirmation system.              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   2014 - Present    Diarrhea R19.7   2014 - Present    Hepatitis  K75.9   5/8/2014 - Present    Hearing loss H91.90   5/8/2014 - Present    Varicose veins I86.8   5/8/2014 - Present    Fibromyalgia M79.7   5/23/2017 - Present      Health Maintenance        Date Due Completion Dates    IMM DTaP/Tdap/Td Vaccine (1 - Tdap) 9/5/1956 ---    PAP SMEAR 9/5/1958 ---    COLONOSCOPY 9/5/1987 ---    IMM ZOSTER VACCINE 9/5/1997 ---    IMM PNEUMOCOCCAL 65+ (ADULT) LOW/MEDIUM RISK SERIES (1 of 2 - PCV13) 9/5/2002 ---    MAMMOGRAM 11/28/2017 11/28/2016, 11/24/2015, 11/4/2014, 8/14/2013, 3/16/2012, 5/5/2009, 5/5/2009, 3/24/2009, 1/22/2008, 1/22/2008, 12/26/2006, 11/21/2005, 11/19/2004    BONE DENSITY 11/8/2021 11/8/2016, 11/4/2014, 8/14/2013, 3/19/2012, 3/15/2004            Current Immunizations     No immunizations on file.      Below and/or attached are the medications your provider expects you to take. Review all of your home medications and newly ordered medications with your provider and/or pharmacist. Follow medication instructions as directed by your provider and/or pharmacist. Please keep your medication list with you and share with your provider. Update the information when medications are discontinued, doses are changed, or new medications (including over-the-counter products) are added; and carry medication information at all times in the event of emergency situations     Allergies:  AMOXICILLIN - (reactions not documented)     AUGMENTIN - (reactions not documented)     CIPRO XR - (reactions not documented)     LEVAQUIN - (reactions not documented)     MORPHINE - (reactions not documented)     PCN - (reactions not documented)     SULFA DRUGS - (reactions not documented)               Medications  Valid as of: May 23, 2017 -  9:54 AM    Generic Name Brand Name Tablet Size Instructions for use    B Complex Vitamins   Take  by mouth every day.        Carbamide Peroxide (Solution) DEBROX 6.5 % Place 6 Drops in left ear 2 times a day. Administer drops in both ears.        Cholecalciferol    Take  by mouth every day.        Denosumab (Solution) PROLIA 60 MG/ML Inject 60 mg as instructed Once. Every 6 months   Indications: Osteoporosis        DULoxetine HCl (Cap DR Particles) CYMBALTA 60 MG Take 60 mg by mouth every day.        Estradiol   Insert  in vagina.        Estradiol   Take  by mouth.        Gabapentin (Cap) NEURONTIN 100 MG 1 tab po qhs        Glutathione   50 mg by Does not apply route 2 Times a Day.        Hyaluronic Acid-Vitamin C   Take  by mouth every day.        Ketoconazole (Tab) NIZORAL 200 MG Take 1 Tab by mouth every day.        Lysine   Take  by mouth every day.        Meloxicam (Tab) MOBIC 15 MG 1 tab po qday with food for joint pain        Methenamine Hippurate (Tab) HIPPREX 1 GM Take 1 g by mouth 4 times a day.        Milk Thistle   Take  by mouth every day.        Multiple Vitamins-Minerals   Take  by mouth every day.        Multiple Vitamins-Minerals   Take  by mouth every day.        Nutritional Supplements   Take  by mouth every day.        Pentosan Polysulfate Sodium (Cap) ELMIRON 100 MG Take 100 mg by mouth 3 times a day before meals.        Phenazopyridine HCl (Tab) PYRIDIUM 100 MG Take 1 Tab by mouth 3 times a day as needed.        Probiotic Product (Chew Tab) REZYST IM  Take 1 Tab by mouth every day.        Thyroid (Tab) ARMOUR THYROID 30 MG Take 30 mg by mouth every day.        TraZODone HCl (Tab) DESYREL 100 MG Take 100 mg by mouth every evening.        ValACYclovir HCl (Tab) VALTREX 500 MG Take 500 mg by mouth 2 times a day.        .                 Medicines prescribed today were sent to:     [facility]/PHARMACY #[id number] - [place] - [address]    [address] [place] [address]    Phone: [phone] Fax: [phone]    Open 24 Hours?: No    [facility] - [place], [place] - [address]    [address] [facility] [address]    Phone: [phone] Fax: [phone]    Open 24 Hours?: No      Medication refill instructions:        If your prescription bottle indicates you have medication refills left, it is not necessary to call your provider’s office. Please contact your pharmacy and they will refill your medication.    If your prescription bottle indicates you do not have any refills left, you may request refills at any time through one of the following ways: The online [employer] system (except Urgent Care), by calling your provider’s office, or by asking your pharmacy to contact your provider’s office with a refill request. Medication refills are processed only during regular business hours and may not be available until the next business day. Your provider may request additional information or to have a follow-up visit with you prior to refilling your medication.   *Please Note: Medication refills are assigned a new Rx number when refilled electronically. Your pharmacy may indicate that no refills were authorized even though a new prescription for the same medication is available at the pharmacy. Please request the medicine by name with the pharmacy before contacting your provider for a refill.           [employer] Access Code: Activation code not generated  Current [employer] Status: Active

## 2017-05-23 NOTE — Clinical Note
[facility]   [address], [address]  [place] [address]  Phone: [phone]  Fax: [phone]              Encounter Date: 5/23/2017    Dear Dr. Nobles ref. provider found,    It was a pleasure seeing your patient, Susan Menendez, on 5/23/2017. Diagnoses of Primary osteoarthritis involving multiple joints, Osteopenia of multiple sites, Cervicalgia, Chronic bilateral low back pain without sciatica, Hypothyroidism, unspecified type, and Fibromyalgia were pertinent to this visit.     Please find attached progress note which includes the history I obtained from Ms. Menendez, my physical examination findings, my impression and recommendations.      Once again, it was a pleasure participating in your patient's care.  Please feel free to contact me if you have any questions or if I can be of any further assistance to your patients.      Sincerely,    Zena Benavides M.D.  Electronically Signed          PROGRESS NOTE:  No notes on file

## 2017-05-23 NOTE — PROGRESS NOTES
Chief Complaint- joint pain    Subjective:   Susan Menendez is a 81 y.o. female here today for follow up of rheumatological issues    This is a follow-up patient to this clinic,  previously followed by Dr Gutierrez for osteopenia/osteoporosis, patient also remote past had a history of polymyalgia rheumatica for which patient continues to follow-up on the regular basis for continued monitoring.  Patient denies any jaw claudication, denies any amaurosis fugax, does have some various aches and pains and does complain of lack of endurance that has been ongoing for many years. Patient states that she used to be quite active in sports, patient was last active in sports at 50 years of age about 30 years ago. Patient states at that time she had been diagnosed with fibromyalgia. Pain complaints for patient today is neck and back pain, status post MRI of lumbar sacral spine that showed degenerative disc disease, status post physical therapy of lumbar sacral spine and C-spine and also status post epidurals from her pain clinic.     Uric acid 3.4 2/2013  RF neg 2/2013  CCP neg 2/2015  MARLEE neg 2/2015  DEXA 10/2014 T scores -2.3, -1.2  DEXA 11/2016 T scores -0.8, -2.3          Current medicines (including changes today)  Current Outpatient Prescriptions   Medication Sig Dispense Refill   • Estradiol (ESTRACE PO) Take  by mouth.     • gabapentin (NEURONTIN) 100 MG Cap 1 tab po qhs 90 Cap 0   • meloxicam (MOBIC) 15 MG tablet 1 tab po qday with food for joint pain 90 Tab 0   • thyroid (ARMOUR THYROID) 30 MG Tab Take 30 mg by mouth every day.     • GLUTATHIONE 50 mg by Does not apply route 2 Times a Day.     • duloxetine (CYMBALTA) 60 MG CPEP Take 60 mg by mouth every day.     • trazodone (DESYREL) 100 MG TABS Take 100 mg by mouth every evening.     • Cholecalciferol (VITAMIN D PO) Take  by mouth every day.     • B Complex Vitamins (VITAMIN B COMPLEX PO) Take  by mouth every day.     • MILK THISTLE PO Take  by mouth every day.     •  LYSINE PO Take  by mouth every day.     • Hyaluronic Acid-Vitamin C (HYALURONIC ACID PO) Take  by mouth every day.     • Multiple Vitamins-Minerals (BONE SMART PO) Take  by mouth every day.     • Estradiol (VAGIFEM VA) Insert  in vagina.     • phenazopyridine (PYRIDIUM) 100 MG Tab Take 1 Tab by mouth 3 times a day as needed. 6 Tab 0   • methenamine hip (HIPPREX) 1 GM Tab Take 1 g by mouth 4 times a day.     • carbamide peroxide (CARBAMOXIDE EAR DROPS) 6.5 % Solution Place 6 Drops in left ear 2 times a day. Administer drops in both ears. 1 Bottle 0   • ketoconazole (NIZORAL) 200 MG TABS Take 1 Tab by mouth every day. 20 Tab 0   • denosumab (PROLIA) 60 MG/ML SOLN Inject 60 mg as instructed Once. Every 6 months   Indications: Osteoporosis     • Probiotic Product (REZYST IM) CHEW Take 1 Tab by mouth every day. 30 Tab 3   • Nutritional Supplements (DHEA PO) Take  by mouth every day.     • Multiple Vitamins-Minerals (EYE VITAMINS PO) Take  by mouth every day.     • valacyclovir (VALTREX) 500 MG TABS Take 500 mg by mouth 2 times a day.     • pentosan (ELMIRON) 100 MG CAPS Take 100 mg by mouth 3 times a day before meals.       No current facility-administered medications for this visit.     She  has a past medical history of Hypothyroid; Chronic UTI; Arthritis; Other specified disorder of intestines; Urinary bladder disorder; Pain; Anesthesia; Depression; Chronic diarrhea; Varicose veins; History of cataract surgery; and Interstitial cystitis.    ROS   Other than what is mentioned in HPI or physical exam, there is no history of headaches, double vision or blurred vision. No temporal tenderness or jaw claudication. No history of cataracts or glaucoma. No trouble swallowing difficulties or sore throats. No history of thyroid disease. No chest complaints including chest pain, cough or sputum production. No shortness of breath. No GI complaints including nausea, vomiting, change in bowel habits, or past peptic ulcer disease. No  history of blood in the stools. No urinary complaints including dysuria or frequency. No history of rash including psoriasis. No history of alopecia, photosensitivity, oral ulcerations, Raynaud's phenomena, or swollen joints. No history of gout. No back complaints. No history of low blood counts.       Objective:     Blood pressure 104/64, pulse 78, temperature 37.4 °C (99.3 °F), resp. rate 12, weight 60.782 kg (134 lb), SpO2 98 %, not currently breastfeeding. Body mass index is 20.98 kg/(m^2).   Physical Exam:  GENERAL: Thin  female Alert and oriented x 3, No apparent distress. SKIN: No inflammation, normal turgor, no rashes. HEENT: Atraumatic, unremarkable. PERRLA, extraocular movements are intact. Conjunctiva clear. Fundi not examined. Temporal arteries are palpable and non-tender. THROAT: Clear. NECK: Supple with good range of motion including flexion, extension, lateral rotation and bending. No JVD, thyromegaly or adenopathy is appreciated. Carotids are palpable, no bruits. CHEST: Clear to ausculation and percussion bilaterally, no rales or rhonchi. Respiratory efforts good. BREASTS: Not examined. COR: Regular rate and rhythm. No murmurs, rubs or gallops. ABDOMEN: Soft, non-tender, non-distended. Normal active bowel sounds. No organomegaly appreciated. No hepatomegaly. EXTREMITIES: No clubbing, cyanosis, edema. MUSCULOSKELETAL: Both shoulders have full range of motion including internal and external rotation and abduction. Both elbows have full range of motion without active active synovitis. The wrists have good range of motion without limitations. The small joints of the hands are unremarkable without significant swelling, patient does have some mild squaring of the thumb CMC joints with right worse than left and some mild tenderness along the right CMC joint. There are no tophi or nodules appreciated. The hips, knees, ankles and feet all have good range of motion. Low back is normal. There is no SI  tenderness to compression or palpation. There is good lumbar flexion. Patient does have tender points in the upper back and neck and shoulders and elbows bilaterally. RECTAL: Not done. : Not done. NEUROLOGICAL: Grossly intact. Motor and sensory examinations are normal. PULSES. Intact    Lab Results   Component Value Date/Time    SODIUM 130* 05/19/2017 03:27 PM    POTASSIUM 4.3 05/19/2017 03:27 PM    CHLORIDE 99 05/19/2017 03:27 PM    CO2 26 05/19/2017 03:27 PM    GLUCOSE 89 05/19/2017 03:27 PM    BUN 19 05/19/2017 03:27 PM    CREATININE 0.93 05/19/2017 03:27 PM      Lab Results   Component Value Date/Time    WBC 6.7 05/19/2017 03:27 PM    RBC 3.92* 05/19/2017 03:27 PM    HEMOGLOBIN 11.9* 05/19/2017 03:27 PM    HEMATOCRIT 35.5* 05/19/2017 03:27 PM    MCV 90.6 05/19/2017 03:27 PM    MCH 30.4 05/19/2017 03:27 PM    MCHC 33.5* 05/19/2017 03:27 PM    MPV 10.1 05/19/2017 03:27 PM    NEUTROPHILS-POLYS 61.10 05/19/2017 03:27 PM    LYMPHOCYTES 28.80 05/19/2017 03:27 PM    MONOCYTES 8.10 05/19/2017 03:27 PM    EOSINOPHILS 1.20 05/19/2017 03:27 PM    BASOPHILS 0.70 05/19/2017 03:27 PM      Lab Results   Component Value Date/Time    CALCIUM 9.3 05/19/2017 03:27 PM    AST(SGOT) 22 05/19/2017 03:27 PM    ALT(SGPT) 18 05/19/2017 03:27 PM    ALKALINE PHOSPHATASE 91 05/19/2017 03:27 PM    TOTAL BILIRUBIN 0.3 05/19/2017 03:27 PM    ALBUMIN 4.1 05/19/2017 03:27 PM    TOTAL PROTEIN 7.1 05/19/2017 03:27 PM     Lab Results   Component Value Date/Time    URIC ACID 3.7 03/04/2017 09:45 AM    RHEUMATOID FACTOR -NEPH- 8 02/27/2013 10:56 AM    CCP ANTIBODIES 3 02/27/2015 08:44 AM    ANTINUCLEAR ANTIBODY None Detected 02/27/2015 08:44 AM     Lab Results   Component Value Date/Time    SED RATE WESTERGREN 18 05/19/2017 03:27 PM     Lab Results   Component Value Date/Time    GLYCOHEMOGLOBIN 5.5 05/19/2014 09:25 AM     Lab Results   Component Value Date/Time    CPK TOTAL 84 11/30/2016 10:32 AM     Results for orders placed during the hospital  encounter of 11/08/16   DS-BONE DENSITY STUDY (DEXA)    Impression According to the World Health Organization classification, bone mineral density of this patient is osteopenia with increased fracture risk.        10-year Probability of Fracture:  Major Osteoporotic     23.8%  Hip     8.4%  Population      [place] ()    Based on left femur neck BMD          INTERPRETING LOCATION:  [address] [place] [address], [id number]     Results for orders placed during the hospital encounter of 06/09/10   DX-HAND 3+     Results for orders placed during the hospital encounter of 05/18/16   DX-WRIST-COMPLETE 3+ RIGHT    Impression Degenerative changes of the wrist and base of the thumb without definite acute osseous abnormality.     Results for orders placed during the hospital encounter of 10/10/16   MR-LUMBAR SPINE-W/O    Impression 1.  Mild grade 1 spondylolisthesis at the L4-5 level.    2.  Mild degenerative retrolisthesis at the L1-2 level with moderate discal and marked endplate degenerative changes.    3.  Mild multilevel lumbar spondylotic change.    4.  Small 5 mm synovial cyst adjacent to the medial aspect of the left L4-5 facet joint.    5.  Mild to moderate neural foraminal narrowing at the L4-5 and L5-S1 levels.     Results for orders placed during the hospital encounter of 06/09/10   DX-CERVICAL SPINE-2 OR 3 VIEWS     Assessment and Plan:     1. Primary osteoarthritis involving multiple joints  We will switch Celebrex to meloxicam 15 mg by mouth daily with food to see if this might help, also reemphasized the use of holistic medicine i.e. tumeric for which patient knows about but had forgotten to try.  - gabapentin (NEURONTIN) 100 MG Cap; 1 tab po qhs  Dispense: 90 Cap; Refill: 0  - meloxicam (MOBIC) 15 MG tablet; 1 tab po qday with food for joint pain  Dispense: 90 Tab; Refill: 0    2. Osteopenia of multiple sites  Last DEXA in November 2016 X DEXA November 2018   continue calcium 1200 mg by mouth daily and vitamin D  about 1000 units by mouth daily and magnesium 400 mg by mouth daily  - gabapentin (NEURONTIN) 100 MG Cap; 1 tab po qhs  Dispense: 90 Cap; Refill: 0  - meloxicam (MOBIC) 15 MG tablet; 1 tab po qday with food for joint pain  Dispense: 90 Tab; Refill: 0    3. Cervicalgia  Refer back to pain management for which patient gets epidurals, patient believes it's [facility]  - gabapentin (NEURONTIN) 100 MG Cap; 1 tab po qhs  Dispense: 90 Cap; Refill: 0  - meloxicam (MOBIC) 15 MG tablet; 1 tab po qday with food for joint pain  Dispense: 90 Tab; Refill: 0    4. Chronic bilateral low back pain without sciatica  Patient to follow-up with [facility]    5. Hypothyroidism, unspecified type  Can exacerbate joint pain, I recommend monitoring thyroid on a regular basis to assure it is not contributing to the patient's joint pain    6. Fibromyalgia  Do trial of low-dose gabapentin 100 mg by mouth daily at bedtime.  - gabapentin (NEURONTIN) 100 MG Cap; 1 tab po qhs  Dispense: 90 Cap; Refill: 0  - meloxicam (MOBIC) 15 MG tablet; 1 tab po qday with food for joint pain  Dispense: 90 Tab; Refill: 0    Followup: Return in about 2 months (around 7/23/2017). or sooner prn    Patient was seen 30 minutes face-to-face of which more than 50% of the time was spent counseling the patient (excluding time for procedures)  regarding  rheumatological condition and care. Therapy was discussed in detail.    Please note that this dictation was created using voice recognition software. I have made every reasonable attempt to correct obvious errors, but I expect that there are errors of grammar and possibly content that I did not discover before finalizing the note.

## 2017-06-07 ENCOUNTER — APPOINTMENT (OUTPATIENT)
Dept: RHEUMATOLOGY | Facility: PHYSICIAN GROUP | Age: 82
End: 2017-06-07
Payer: MEDICARE

## 2017-08-03 ENCOUNTER — APPOINTMENT (OUTPATIENT)
Dept: RHEUMATOLOGY | Facility: PHYSICIAN GROUP | Age: 82
End: 2017-08-03
Payer: MEDICARE

## 2017-08-04 ENCOUNTER — OFFICE VISIT (OUTPATIENT)
Dept: RHEUMATOLOGY | Facility: PHYSICIAN GROUP | Age: 82
End: 2017-08-04
Payer: MEDICARE

## 2017-08-04 VITALS
RESPIRATION RATE: 12 BRPM | DIASTOLIC BLOOD PRESSURE: 70 MMHG | SYSTOLIC BLOOD PRESSURE: 108 MMHG | BODY MASS INDEX: 20.19 KG/M2 | OXYGEN SATURATION: 97 % | HEART RATE: 75 BPM | WEIGHT: 133.2 LBS | HEIGHT: 68 IN | TEMPERATURE: 97.7 F

## 2017-08-04 DIAGNOSIS — M79.7 FIBROMYALGIA: ICD-10-CM

## 2017-08-04 DIAGNOSIS — D64.9 ANEMIA, UNSPECIFIED: ICD-10-CM

## 2017-08-04 DIAGNOSIS — M15.9 PRIMARY OSTEOARTHRITIS INVOLVING MULTIPLE JOINTS: ICD-10-CM

## 2017-08-04 DIAGNOSIS — M85.89 OSTEOPENIA OF MULTIPLE SITES: ICD-10-CM

## 2017-08-04 DIAGNOSIS — M54.2 CERVICALGIA: ICD-10-CM

## 2017-08-04 DIAGNOSIS — Z79.1 ENCOUNTER FOR LONG-TERM (CURRENT) USE OF NSAIDS: ICD-10-CM

## 2017-08-04 PROCEDURE — 99213 OFFICE O/P EST LOW 20 MIN: CPT | Performed by: INTERNAL MEDICINE

## 2017-08-04 RX ORDER — MELOXICAM 15 MG/1
TABLET ORAL
Qty: 90 TAB | Refills: 1 | Status: SHIPPED | OUTPATIENT
Start: 2017-08-04 | End: 2018-04-09 | Stop reason: SDUPTHER

## 2017-08-04 ASSESSMENT — PAIN SCALES - GENERAL: PAINLEVEL: 2=MINIMAL-SLIGHT

## 2017-08-04 NOTE — PROGRESS NOTES
Chief Complaint- joint pain    Subjective:   Susan Menendez is a 81 y.o. female here today for follow up of rheumatological issues    This is a follow-up patient to this clinic for DJD and osteopenia/osteoporosis. Remote history of PMR which is completely resolved. Previously followed by Dr Gutierrez.  Patient denies any jaw claudication, denies any amaurosis fugax, does have some various aches and pains and does complain of lack of endurance that has been ongoing for many years. Patient states that she used to be quite active in sports, patient was last active in sports at 50 years of age about 30 years ago. Patient states at that time she had been diagnosed with fibromyalgia. Patient without a lot of complaints today, last visit we switched patient from Celebrex to meloxicam a patient states it helps her quite a bit and would like to continue meloxicam. Status post MRI of lumbar sacral spine that showed degenerative disc disease, status post physical therapy of lumbar sacral spine and C-spine and also status post epidurals from her pain clinic.     Uric acid 3.4 2/2013  RF neg 2/2013  CCP neg 2/2015  MARLEE neg 2/2015  DEXA 10/2014 T scores -2.3, -1.2  DEXA 11/2016 T scores -0.8, -2.3    MRI LS spine 10/2016-indicates DJD and mild to moderate neural foraminal narrowing      Current medicines (including changes today)  Current Outpatient Prescriptions   Medication Sig Dispense Refill   • meloxicam (MOBIC) 15 MG tablet 1 tab po qday with food for joint pain 90 Tab 1   • Estradiol (ESTRACE PO) Take  by mouth.     • thyroid (ARMOUR THYROID) 30 MG Tab Take 30 mg by mouth every day.     • GLUTATHIONE 50 mg by Does not apply route 2 Times a Day.     • duloxetine (CYMBALTA) 60 MG CPEP Take 60 mg by mouth every day.     • trazodone (DESYREL) 100 MG TABS Take 100 mg by mouth every evening.     • Cholecalciferol (VITAMIN D PO) Take  by mouth every day.     • B Complex Vitamins (VITAMIN B COMPLEX PO) Take  by mouth every day.      • MILK THISTLE PO Take  by mouth every day.     • LYSINE PO Take  by mouth every day.     • Hyaluronic Acid-Vitamin C (HYALURONIC ACID PO) Take  by mouth every day.     • Multiple Vitamins-Minerals (BONE SMART PO) Take  by mouth every day.     • Estradiol (VAGIFEM VA) Insert  in vagina.     • gabapentin (NEURONTIN) 100 MG Cap 1 tab po qhs 90 Cap 0   • phenazopyridine (PYRIDIUM) 100 MG Tab Take 1 Tab by mouth 3 times a day as needed. 6 Tab 0   • methenamine hip (HIPPREX) 1 GM Tab Take 1 g by mouth 4 times a day.     • carbamide peroxide (CARBAMOXIDE EAR DROPS) 6.5 % Solution Place 6 Drops in left ear 2 times a day. Administer drops in both ears. 1 Bottle 0   • ketoconazole (NIZORAL) 200 MG TABS Take 1 Tab by mouth every day. 20 Tab 0   • denosumab (PROLIA) 60 MG/ML SOLN Inject 60 mg as instructed Once. Every 6 months   Indications: Osteoporosis     • Probiotic Product (REZYST IM) CHEW Take 1 Tab by mouth every day. 30 Tab 3   • Nutritional Supplements (DHEA PO) Take  by mouth every day.     • Multiple Vitamins-Minerals (EYE VITAMINS PO) Take  by mouth every day.     • valacyclovir (VALTREX) 500 MG TABS Take 500 mg by mouth 2 times a day.     • pentosan (ELMIRON) 100 MG CAPS Take 100 mg by mouth 3 times a day before meals.       No current facility-administered medications for this visit.     She  has a past medical history of Hypothyroid; Chronic UTI; Arthritis; Other specified disorder of intestines; Urinary bladder disorder; Pain; Anesthesia; Depression; Chronic diarrhea; Varicose veins; History of cataract surgery; and Interstitial cystitis.    ROS   Other than what is mentioned in HPI or physical exam, there is no history of headaches, double vision or blurred vision. No temporal tenderness or jaw claudication. No history of cataracts or glaucoma. No trouble swallowing difficulties or sore throats. No history of thyroid disease. No chest complaints including chest pain, cough or sputum production. No shortness  "of breath. No GI complaints including nausea, vomiting, change in bowel habits, or past peptic ulcer disease. No history of blood in the stools. No urinary complaints including dysuria or frequency. No history of rash including psoriasis. No history of alopecia, photosensitivity, oral ulcerations, Raynaud's phenomena, or swollen joints. No history of gout. No back complaints. No history of low blood counts.       Objective:     Blood pressure 108/70, pulse 75, temperature 36.5 °C (97.7 °F), resp. rate 12, height 1.715 m (5' 7.5\"), weight 60.419 kg (133 lb 3.2 oz), SpO2 97 %. Body mass index is 20.54 kg/(m^2).   Physical Exam:  GENERAL: Thin  female Alert and oriented x 3, No apparent distress. SKIN: No inflammation, normal turgor, no rashes. HEENT: Atraumatic, unremarkable. PERRLA, extraocular movements are intact. Conjunctiva clear. Fundi not examined. Temporal arteries are palpable and non-tender. THROAT: Clear. NECK: Supple with good range of motion including flexion, extension, lateral rotation and bending. No JVD, thyromegaly or adenopathy is appreciated. Carotids are palpable, no bruits. CHEST: Clear to ausculation and percussion bilaterally, no rales or rhonchi. Respiratory efforts good. BREASTS: Not examined. COR: Regular rate and rhythm. No murmurs, rubs or gallops. ABDOMEN: Soft, non-tender, non-distended. Normal active bowel sounds. No organomegaly appreciated. No hepatomegaly. EXTREMITIES: No clubbing, cyanosis, edema. MUSCULOSKELETAL: Both shoulders have full range of motion including internal and external rotation and abduction. Both elbows have full range of motion without active active synovitis. The wrists have good range of motion without limitations. The small joints of the hands are unremarkable without significant swelling, patient does have some mild squaring of the thumb CMC joints with right worse than left and some mild tenderness along the right CMC joint. There are no tophi or " nodules appreciated. The hips, knees, ankles and feet all have good range of motion. Low back is normal. There is no SI tenderness to compression or palpation. There is good lumbar flexion. Patient does have tender points in the upper back and neck and shoulders and elbows bilaterally. RECTAL: Not done. : Not done. NEUROLOGICAL: Grossly intact. Motor and sensory examinations are normal. PULSES. Intact    Lab Results   Component Value Date/Time    SODIUM 130* 05/19/2017 03:27 PM    POTASSIUM 4.3 05/19/2017 03:27 PM    CHLORIDE 99 05/19/2017 03:27 PM    CO2 26 05/19/2017 03:27 PM    GLUCOSE 89 05/19/2017 03:27 PM    BUN 19 05/19/2017 03:27 PM    CREATININE 0.93 05/19/2017 03:27 PM      Lab Results   Component Value Date/Time    WBC 6.7 05/19/2017 03:27 PM    RBC 3.92* 05/19/2017 03:27 PM    HEMOGLOBIN 11.9* 05/19/2017 03:27 PM    HEMATOCRIT 35.5* 05/19/2017 03:27 PM    MCV 90.6 05/19/2017 03:27 PM    MCH 30.4 05/19/2017 03:27 PM    MCHC 33.5* 05/19/2017 03:27 PM    MPV 10.1 05/19/2017 03:27 PM    NEUTROPHILS-POLYS 61.10 05/19/2017 03:27 PM    LYMPHOCYTES 28.80 05/19/2017 03:27 PM    MONOCYTES 8.10 05/19/2017 03:27 PM    EOSINOPHILS 1.20 05/19/2017 03:27 PM    BASOPHILS 0.70 05/19/2017 03:27 PM      Lab Results   Component Value Date/Time    CALCIUM 9.3 05/19/2017 03:27 PM    AST(SGOT) 22 05/19/2017 03:27 PM    ALT(SGPT) 18 05/19/2017 03:27 PM    ALKALINE PHOSPHATASE 91 05/19/2017 03:27 PM    TOTAL BILIRUBIN 0.3 05/19/2017 03:27 PM    ALBUMIN 4.1 05/19/2017 03:27 PM    TOTAL PROTEIN 7.1 05/19/2017 03:27 PM     Lab Results   Component Value Date/Time    URIC ACID 3.7 03/04/2017 09:45 AM    RHEUMATOID FACTOR -NEPH- 8 02/27/2013 10:56 AM    CCP ANTIBODIES 3 02/27/2015 08:44 AM    ANTINUCLEAR ANTIBODY None Detected 02/27/2015 08:44 AM     Lab Results   Component Value Date/Time    SED RATE WESTERGREN 18 05/19/2017 03:27 PM     Lab Results   Component Value Date/Time    GLYCOHEMOGLOBIN 5.5 05/19/2014 09:25 AM     Lab  Results   Component Value Date/Time    CPK TOTAL 84 11/30/2016 10:32 AM     Results for orders placed during the hospital encounter of 11/08/16   DS-BONE DENSITY STUDY (DEXA)    Impression According to the World Health Organization classification, bone mineral density of this patient is osteopenia with increased fracture risk.        10-year Probability of Fracture:  Major Osteoporotic     23.8%  Hip     8.4%  Population      [place] ()    Based on left femur neck BMD          INTERPRETING LOCATION:  [address] [place] [address], [id number]     Results for orders placed during the hospital encounter of 06/09/10   DX-HAND 3+     Results for orders placed during the hospital encounter of 05/18/16   DX-WRIST-COMPLETE 3+ RIGHT    Impression Degenerative changes of the wrist and base of the thumb without definite acute osseous abnormality.     Results for orders placed during the hospital encounter of 10/10/16   MR-LUMBAR SPINE-W/O    Impression 1.  Mild grade 1 spondylolisthesis at the L4-5 level.    2.  Mild degenerative retrolisthesis at the L1-2 level with moderate discal and marked endplate degenerative changes.    3.  Mild multilevel lumbar spondylotic change.    4.  Small 5 mm synovial cyst adjacent to the medial aspect of the left L4-5 facet joint.    5.  Mild to moderate neural foraminal narrowing at the L4-5 and L5-S1 levels.     Assessment and Plan:     1. Primary osteoarthritis involving multiple joints  Continue meloxicam 15 mg by mouth daily when necessary,  Labs to be done every 6 months, last labs in May 2017, next labs do November 2017  - meloxicam (MOBIC) 15 MG tablet; 1 tab po qday with food for joint pain  Dispense: 90 Tab; Refill: 1  - OCCULT BLOOD X3 (STOOL)    2. Osteopenia of multiple sites  Last DEXA November 2016, next DEXA November 2018   continue calcium 1200 mg by mouth daily and vitamin D about 1000 units by mouth daily and magnesium 400 mg by mouth daily  - meloxicam (MOBIC) 15 MG tablet; 1  tab po qday with food for joint pain  Dispense: 90 Tab; Refill: 1  - OCCULT BLOOD X3 (STOOL)    3. Anemia, unspecified  Slight anemia, recommend patient start plant iron, we will also check stool guaiac to assure no internal bleeding possibly from the NSAIDs  - meloxicam (MOBIC) 15 MG tablet; 1 tab po qday with food for joint pain  Dispense: 90 Tab; Refill: 1  - OCCULT BLOOD X3 (STOOL)    4. Encounter for long-term (current) use of NSAIDs  CBC and copper has a panel to be done every 6 months  - OCCULT BLOOD X3 (STOOL)    5. Cervicalgia  - meloxicam (MOBIC) 15 MG tablet; 1 tab po qday with food for joint pain  Dispense: 90 Tab; Refill: 1  - OCCULT BLOOD X3 (STOOL)    6. Fibromyalgia  Patient on gabapentin 100 mg by mouth daily at bedtime  - meloxicam (MOBIC) 15 MG tablet; 1 tab po qday with food for joint pain  Dispense: 90 Tab; Refill: 1  - OCCULT BLOOD X3 (STOOL)    Followup: Return in about 6 months (around 2/4/2018). or sooner prn    Patient was seen 30 minutes face-to-face of which more than 50% of the time was spent counseling the patient (excluding time for procedures)  regarding  rheumatological condition and care. Therapy was discussed in detail.    Please note that this dictation was created using voice recognition software. I have made every reasonable attempt to correct obvious errors, but I expect that there are errors of grammar and possibly content that I did not discover before finalizing the note.

## 2017-08-04 NOTE — MR AVS SNAPSHOT
"        Susan Crowedulce   2017 11:15 AM   Office Visit   MRN: [id number]    Department:  [facility]ology [facility]   Dept Phone:  [phone]    Description:  Female : 1935   Provider:  Zena Benavides M.D.           Reason for Visit     Follow-Up           Allergies as of 2017     Allergen Noted Reactions    Amoxicillin 2012       Unknown reaction    Augmentin 2011       Stomach ache      Cipro Xr 11/15/2013       Levaquin 2011       Doesn't work      Morphine 2012       Pcn [Penicillins] 2011       unknown    Sulfa Drugs 2011         You were diagnosed with     Primary osteoarthritis involving multiple joints   [2198227]       Osteopenia of multiple sites   [7507285]       Anemia, unspecified   [285.9.ICD-9-CM]       Encounter for long-term (current) use of NSAIDs   [453045]       Cervicalgia   [723.1.ICD-9-CM]       Fibromyalgia   [975086]         Vital Signs     Blood Pressure Pulse Temperature Respirations Height Weight    108/70 mmHg 75 36.5 °C (97.7 °F) 12 1.715 m (5' 7.5\") 60.419 kg (133 lb 3.2 oz)    Body Mass Index Oxygen Saturation Smoking Status             20.54 kg/m2 97% Former Smoker         Basic Information     Date Of Birth Sex Race Ethnicity Preferred Language    1935 Female White Non- English      Your appointments     2018 11:30 AM   Follow Up Visit with Zena Benavides M.D.   [facility] (--)    [address] [place] [id number] [phone]           You will be receiving a confirmation call a few days before your appointment from our automated call confirmation system.              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   2014 - Present    Diarrhea R19.7   2014 - Present    Hepatitis K75.9   2014 - Present    Hearing loss H91.90   " 5/8/2014 - Present    Varicose veins I86.8   5/8/2014 - Present    Fibromyalgia M79.7   5/23/2017 - Present      Health Maintenance        Date Due Completion Dates    IMM DTaP/Tdap/Td Vaccine (1 - Tdap) 9/5/1956 ---    PAP SMEAR 9/5/1958 ---    COLONOSCOPY 9/5/1987 ---    IMM ZOSTER VACCINE 9/5/1997 ---    IMM PNEUMOCOCCAL 65+ (ADULT) LOW/MEDIUM RISK SERIES (1 of 2 - PCV13) 9/5/2002 ---    IMM INFLUENZA (1) 9/1/2017 ---    MAMMOGRAM 11/28/2017 11/28/2016, 11/24/2015, 11/4/2014, 8/14/2013, 3/16/2012, 5/5/2009, 5/5/2009, 3/24/2009, 1/22/2008, 1/22/2008, 12/26/2006, 11/21/2005, 11/19/2004    BONE DENSITY 11/8/2021 11/8/2016, 11/4/2014, 8/14/2013, 3/19/2012, 3/15/2004            Current Immunizations     No immunizations on file.      Below and/or attached are the medications your provider expects you to take. Review all of your home medications and newly ordered medications with your provider and/or pharmacist. Follow medication instructions as directed by your provider and/or pharmacist. Please keep your medication list with you and share with your provider. Update the information when medications are discontinued, doses are changed, or new medications (including over-the-counter products) are added; and carry medication information at all times in the event of emergency situations     Allergies:  AMOXICILLIN - (reactions not documented)     AUGMENTIN - (reactions not documented)     CIPRO XR - (reactions not documented)     LEVAQUIN - (reactions not documented)     MORPHINE - (reactions not documented)     PCN - (reactions not documented)     SULFA DRUGS - (reactions not documented)               Medications  Valid as of: August 04, 2017 - 12:03 PM    Generic Name Brand Name Tablet Size Instructions for use    B Complex Vitamins   Take  by mouth every day.        Carbamide Peroxide (Solution) DEBROX 6.5 % Place 6 Drops in left ear 2 times a day. Administer drops in both ears.        Cholecalciferol   Take  by mouth  every day.        Denosumab (Solution) PROLIA 60 MG/ML Inject 60 mg as instructed Once. Every 6 months   Indications: Osteoporosis        DULoxetine HCl (Cap DR Particles) CYMBALTA 60 MG Take 60 mg by mouth every day.        Estradiol   Insert  in vagina.        Estradiol   Take  by mouth.        Gabapentin (Cap) NEURONTIN 100 MG 1 tab po qhs        Glutathione   50 mg by Does not apply route 2 Times a Day.        Hyaluronic Acid-Vitamin C   Take  by mouth every day.        Ketoconazole (Tab) NIZORAL 200 MG Take 1 Tab by mouth every day.        Lysine   Take  by mouth every day.        Meloxicam (Tab) MOBIC 15 MG 1 tab po qday with food for joint pain        Methenamine Hippurate (Tab) HIPPREX 1 GM Take 1 g by mouth 4 times a day.        Milk Thistle   Take  by mouth every day.        Multiple Vitamins-Minerals   Take  by mouth every day.        Multiple Vitamins-Minerals   Take  by mouth every day.        Nutritional Supplements   Take  by mouth every day.        Pentosan Polysulfate Sodium (Cap) ELMIRON 100 MG Take 100 mg by mouth 3 times a day before meals.        Phenazopyridine HCl (Tab) PYRIDIUM 100 MG Take 1 Tab by mouth 3 times a day as needed.        Probiotic Product (Chew Tab) REZYST IM  Take 1 Tab by mouth every day.        Thyroid (Tab) ARMOUR THYROID 30 MG Take 30 mg by mouth every day.        TraZODone HCl (Tab) DESYREL 100 MG Take 100 mg by mouth every evening.        ValACYclovir HCl (Tab) VALTREX 500 MG Take 500 mg by mouth 2 times a day.        .                 Medicines prescribed today were sent to:     [facility] #[id number] - [place] - [address].    [address] [place] [address]    Phone: [phone] Fax: [phone]    Open 24 Hours?: No    [facility] - [place], [place] - [address]    [address] [facility] [address]    Phone: [phone] Fax: [phone]    Open 24 Hours?: No      Medication refill instructions:       If your  prescription bottle indicates you have medication refills left, it is not necessary to call your provider’s office. Please contact your pharmacy and they will refill your medication.    If your prescription bottle indicates you do not have any refills left, you may request refills at any time through one of the following ways: The online [employer] system (except Urgent Care), by calling your provider’s office, or by asking your pharmacy to contact your provider’s office with a refill request. Medication refills are processed only during regular business hours and may not be available until the next business day. Your provider may request additional information or to have a follow-up visit with you prior to refilling your medication.   *Please Note: Medication refills are assigned a new Rx number when refilled electronically. Your pharmacy may indicate that no refills were authorized even though a new prescription for the same medication is available at the pharmacy. Please request the medicine by name with the pharmacy before contacting your provider for a refill.           [employer] Access Code: Activation code not generated  Current [employer] Status: Active

## 2017-08-04 NOTE — Clinical Note
[facility]   [address], [address]  [place] [address]  Phone: [phone]  Fax: [phone]              Encounter Date: 8/4/2017    Dear Dr. Nobles ref. provider found,    It was a pleasure seeing your patient, Susan Menendez, on 8/4/2017. Diagnoses of Primary osteoarthritis involving multiple joints, Osteopenia of multiple sites, Anemia, unspecified, Encounter for long-term (current) use of NSAIDs, Cervicalgia, and Fibromyalgia were pertinent to this visit.     Please find attached progress note which includes the history I obtained from Ms. Menendez, my physical examination findings, my impression and recommendations.      Once again, it was a pleasure participating in your patient's care.  Please feel free to contact me if you have any questions or if I can be of any further assistance to your patients.      Sincerely,    Zena Benavides M.D.  Electronically Signed          PROGRESS NOTE:  No notes on file

## 2017-08-11 ENCOUNTER — HOSPITAL ENCOUNTER (OUTPATIENT)
Facility: MEDICAL CENTER | Age: 82
End: 2017-08-11
Attending: INTERNAL MEDICINE
Payer: MEDICARE

## 2017-08-11 LAB — HEMOCCULT STL QL: NEGATIVE

## 2017-08-11 PROCEDURE — 82272 OCCULT BLD FECES 1-3 TESTS: CPT

## 2017-08-25 ENCOUNTER — HOSPITAL ENCOUNTER (OUTPATIENT)
Facility: MEDICAL CENTER | Age: 82
End: 2017-08-25
Attending: INTERNAL MEDICINE
Payer: MEDICARE

## 2017-08-25 LAB — HEMOCCULT STL QL: NEGATIVE

## 2017-08-25 PROCEDURE — 82272 OCCULT BLD FECES 1-3 TESTS: CPT | Mod: 91

## 2017-08-26 LAB — HEMOCCULT STL QL: NEGATIVE

## 2017-09-08 ENCOUNTER — HOSPITAL ENCOUNTER (OUTPATIENT)
Dept: LAB | Facility: MEDICAL CENTER | Age: 82
End: 2017-09-08
Attending: FAMILY MEDICINE
Payer: MEDICARE

## 2017-09-08 LAB
ALBUMIN SERPL BCP-MCNC: 3.9 G/DL (ref 3.2–4.9)
ALBUMIN/GLOB SERPL: 1.3 G/DL
ALP SERPL-CCNC: 85 U/L (ref 30–99)
ALT SERPL-CCNC: 14 U/L (ref 2–50)
ANION GAP SERPL CALC-SCNC: 7 MMOL/L (ref 0–11.9)
AST SERPL-CCNC: 21 U/L (ref 12–45)
BASOPHILS # BLD AUTO: 1.2 % (ref 0–1.8)
BASOPHILS # BLD: 0.05 K/UL (ref 0–0.12)
BILIRUB SERPL-MCNC: 0.4 MG/DL (ref 0.1–1.5)
BUN SERPL-MCNC: 21 MG/DL (ref 8–22)
CALCIUM SERPL-MCNC: 9.2 MG/DL (ref 8.5–10.5)
CHLORIDE SERPL-SCNC: 100 MMOL/L (ref 96–112)
CO2 SERPL-SCNC: 24 MMOL/L (ref 20–33)
CREAT SERPL-MCNC: 0.69 MG/DL (ref 0.5–1.4)
EOSINOPHIL # BLD AUTO: 0.07 K/UL (ref 0–0.51)
EOSINOPHIL NFR BLD: 1.7 % (ref 0–6.9)
ERYTHROCYTE [DISTWIDTH] IN BLOOD BY AUTOMATED COUNT: 49.2 FL (ref 35.9–50)
FERRITIN SERPL-MCNC: 53.8 NG/ML (ref 10–291)
GFR SERPL CREATININE-BSD FRML MDRD: >60 ML/MIN/1.73 M 2
GLOBULIN SER CALC-MCNC: 3.1 G/DL (ref 1.9–3.5)
GLUCOSE SERPL-MCNC: 75 MG/DL (ref 65–99)
HCT VFR BLD AUTO: 37.7 % (ref 37–47)
HGB BLD-MCNC: 12.3 G/DL (ref 12–16)
IMM GRANULOCYTES # BLD AUTO: 0.01 K/UL (ref 0–0.11)
IMM GRANULOCYTES NFR BLD AUTO: 0.2 % (ref 0–0.9)
IRON SATN MFR SERPL: 28 % (ref 15–55)
IRON SERPL-MCNC: 88 UG/DL (ref 40–170)
LYMPHOCYTES # BLD AUTO: 1.38 K/UL (ref 1–4.8)
LYMPHOCYTES NFR BLD: 34 % (ref 22–41)
MCH RBC QN AUTO: 30.8 PG (ref 27–33)
MCHC RBC AUTO-ENTMCNC: 32.6 G/DL (ref 33.6–35)
MCV RBC AUTO: 94.5 FL (ref 81.4–97.8)
MONOCYTES # BLD AUTO: 0.41 K/UL (ref 0–0.85)
MONOCYTES NFR BLD AUTO: 10.1 % (ref 0–13.4)
NEUTROPHILS # BLD AUTO: 2.14 K/UL (ref 2–7.15)
NEUTROPHILS NFR BLD: 52.8 % (ref 44–72)
NRBC # BLD AUTO: 0 K/UL
NRBC BLD AUTO-RTO: 0 /100 WBC
PLATELET # BLD AUTO: 324 K/UL (ref 164–446)
PMV BLD AUTO: 10.3 FL (ref 9–12.9)
POTASSIUM SERPL-SCNC: 4.4 MMOL/L (ref 3.6–5.5)
PROT SERPL-MCNC: 7 G/DL (ref 6–8.2)
RBC # BLD AUTO: 3.99 M/UL (ref 4.2–5.4)
SODIUM SERPL-SCNC: 131 MMOL/L (ref 135–145)
TIBC SERPL-MCNC: 311 UG/DL (ref 250–450)
TRANSFERRIN SERPL-MCNC: 223 MG/DL (ref 200–370)
WBC # BLD AUTO: 4.1 K/UL (ref 4.8–10.8)

## 2017-09-08 PROCEDURE — 83550 IRON BINDING TEST: CPT

## 2017-09-08 PROCEDURE — 85025 COMPLETE CBC W/AUTO DIFF WBC: CPT

## 2017-09-08 PROCEDURE — 36415 COLL VENOUS BLD VENIPUNCTURE: CPT

## 2017-09-08 PROCEDURE — 84466 ASSAY OF TRANSFERRIN: CPT

## 2017-09-08 PROCEDURE — 82728 ASSAY OF FERRITIN: CPT

## 2017-09-08 PROCEDURE — 83540 ASSAY OF IRON: CPT

## 2017-09-08 PROCEDURE — 80053 COMPREHEN METABOLIC PANEL: CPT

## 2017-09-26 ENCOUNTER — APPOINTMENT (OUTPATIENT)
Dept: RADIOLOGY | Facility: MEDICAL CENTER | Age: 82
End: 2017-09-26
Attending: NURSE PRACTITIONER
Payer: MEDICARE

## 2017-09-27 ENCOUNTER — HOSPITAL ENCOUNTER (OUTPATIENT)
Dept: RADIOLOGY | Facility: MEDICAL CENTER | Age: 82
End: 2017-09-27
Attending: NURSE PRACTITIONER
Payer: MEDICARE

## 2017-09-27 DIAGNOSIS — R10.9 ABDOMINAL PAIN, UNSPECIFIED SITE: ICD-10-CM

## 2017-09-27 PROCEDURE — 76700 US EXAM ABDOM COMPLETE: CPT

## 2017-12-07 ENCOUNTER — HOSPITAL ENCOUNTER (OUTPATIENT)
Dept: LAB | Facility: MEDICAL CENTER | Age: 82
End: 2017-12-07
Attending: FAMILY MEDICINE
Payer: MEDICARE

## 2017-12-07 LAB
BASOPHILS # BLD AUTO: 0.9 % (ref 0–1.8)
BASOPHILS # BLD: 0.04 K/UL (ref 0–0.12)
CRP SERPL HS-MCNC: 1.6 MG/L (ref 0–7.5)
EOSINOPHIL # BLD AUTO: 0.05 K/UL (ref 0–0.51)
EOSINOPHIL NFR BLD: 1.1 % (ref 0–6.9)
ERYTHROCYTE [DISTWIDTH] IN BLOOD BY AUTOMATED COUNT: 47.8 FL (ref 35.9–50)
ERYTHROCYTE [SEDIMENTATION RATE] IN BLOOD BY WESTERGREN METHOD: 10 MM/HOUR (ref 0–30)
FOLATE SERPL-MCNC: 8.6 NG/ML
HCT VFR BLD AUTO: 36.2 % (ref 37–47)
HGB BLD-MCNC: 12 G/DL (ref 12–16)
HIV 1+2 AB+HIV1 P24 AG SERPL QL IA: NON REACTIVE
IMM GRANULOCYTES # BLD AUTO: 0.01 K/UL (ref 0–0.11)
IMM GRANULOCYTES NFR BLD AUTO: 0.2 % (ref 0–0.9)
LYMPHOCYTES # BLD AUTO: 1.55 K/UL (ref 1–4.8)
LYMPHOCYTES NFR BLD: 34.5 % (ref 22–41)
MCH RBC QN AUTO: 31.3 PG (ref 27–33)
MCHC RBC AUTO-ENTMCNC: 33.1 G/DL (ref 33.6–35)
MCV RBC AUTO: 94.5 FL (ref 81.4–97.8)
MONOCYTES # BLD AUTO: 0.4 K/UL (ref 0–0.85)
MONOCYTES NFR BLD AUTO: 8.9 % (ref 0–13.4)
NEUTROPHILS # BLD AUTO: 2.44 K/UL (ref 2–7.15)
NEUTROPHILS NFR BLD: 54.4 % (ref 44–72)
NRBC # BLD AUTO: 0 K/UL
NRBC BLD AUTO-RTO: 0 /100 WBC
PLATELET # BLD AUTO: 302 K/UL (ref 164–446)
PMV BLD AUTO: 10.4 FL (ref 9–12.9)
RBC # BLD AUTO: 3.83 M/UL (ref 4.2–5.4)
TREPONEMA PALLIDUM IGG+IGM AB [PRESENCE] IN SERUM OR PLASMA BY IMMUNOASSAY: NON REACTIVE
TSH SERPL DL<=0.005 MIU/L-ACNC: 0.61 UIU/ML (ref 0.3–3.7)
VIT B12 SERPL-MCNC: 720 PG/ML (ref 211–911)
WBC # BLD AUTO: 4.5 K/UL (ref 4.8–10.8)

## 2017-12-07 PROCEDURE — 86141 C-REACTIVE PROTEIN HS: CPT

## 2017-12-07 PROCEDURE — 84443 ASSAY THYROID STIM HORMONE: CPT

## 2017-12-07 PROCEDURE — 85025 COMPLETE CBC W/AUTO DIFF WBC: CPT

## 2017-12-07 PROCEDURE — 85652 RBC SED RATE AUTOMATED: CPT

## 2017-12-07 PROCEDURE — 82746 ASSAY OF FOLIC ACID SERUM: CPT

## 2017-12-07 PROCEDURE — 82607 VITAMIN B-12: CPT

## 2017-12-07 PROCEDURE — 87086 URINE CULTURE/COLONY COUNT: CPT

## 2017-12-07 PROCEDURE — 36415 COLL VENOUS BLD VENIPUNCTURE: CPT

## 2017-12-07 PROCEDURE — G0475 HIV COMBINATION ASSAY: HCPCS

## 2017-12-07 PROCEDURE — 86780 TREPONEMA PALLIDUM: CPT

## 2017-12-09 LAB
BACTERIA UR CULT: NORMAL
SIGNIFICANT IND 70042: NORMAL
SITE SITE: NORMAL
SOURCE SOURCE: NORMAL

## 2017-12-11 LAB — TEST NAME 95000: NORMAL

## 2018-01-19 ENCOUNTER — HOSPITAL ENCOUNTER (OUTPATIENT)
Dept: RADIOLOGY | Facility: MEDICAL CENTER | Age: 83
End: 2018-01-19
Attending: FAMILY MEDICINE
Payer: MEDICARE

## 2018-01-19 DIAGNOSIS — Z12.31 VISIT FOR SCREENING MAMMOGRAM: ICD-10-CM

## 2018-01-19 PROCEDURE — 77067 SCR MAMMO BI INCL CAD: CPT

## 2018-02-06 DIAGNOSIS — Z01.810 PRE-OPERATIVE CARDIOVASCULAR EXAMINATION: ICD-10-CM

## 2018-02-06 DIAGNOSIS — Z01.812 PRE-OPERATIVE LABORATORY EXAMINATION: ICD-10-CM

## 2018-02-06 LAB
ANION GAP SERPL CALC-SCNC: 9 MMOL/L (ref 0–11.9)
BUN SERPL-MCNC: 18 MG/DL (ref 8–22)
CALCIUM SERPL-MCNC: 9.2 MG/DL (ref 8.5–10.5)
CHLORIDE SERPL-SCNC: 104 MMOL/L (ref 96–112)
CO2 SERPL-SCNC: 24 MMOL/L (ref 20–33)
CREAT SERPL-MCNC: 0.83 MG/DL (ref 0.5–1.4)
EKG IMPRESSION: NORMAL
ERYTHROCYTE [DISTWIDTH] IN BLOOD BY AUTOMATED COUNT: 46.9 FL (ref 35.9–50)
GLUCOSE SERPL-MCNC: 51 MG/DL (ref 65–99)
HCT VFR BLD AUTO: 35.9 % (ref 37–47)
HGB BLD-MCNC: 11.9 G/DL (ref 12–16)
MCH RBC QN AUTO: 31.1 PG (ref 27–33)
MCHC RBC AUTO-ENTMCNC: 33.1 G/DL (ref 33.6–35)
MCV RBC AUTO: 93.7 FL (ref 81.4–97.8)
PLATELET # BLD AUTO: 333 K/UL (ref 164–446)
PMV BLD AUTO: 10.3 FL (ref 9–12.9)
POTASSIUM SERPL-SCNC: 4.1 MMOL/L (ref 3.6–5.5)
RBC # BLD AUTO: 3.83 M/UL (ref 4.2–5.4)
SODIUM SERPL-SCNC: 137 MMOL/L (ref 135–145)
WBC # BLD AUTO: 5.5 K/UL (ref 4.8–10.8)

## 2018-02-06 PROCEDURE — 93010 ELECTROCARDIOGRAM REPORT: CPT | Performed by: INTERNAL MEDICINE

## 2018-02-06 PROCEDURE — 36415 COLL VENOUS BLD VENIPUNCTURE: CPT

## 2018-02-06 PROCEDURE — 85027 COMPLETE CBC AUTOMATED: CPT

## 2018-02-06 PROCEDURE — 80048 BASIC METABOLIC PNL TOTAL CA: CPT

## 2018-02-06 PROCEDURE — 93005 ELECTROCARDIOGRAM TRACING: CPT

## 2018-02-12 ENCOUNTER — HOSPITAL ENCOUNTER (OUTPATIENT)
Facility: MEDICAL CENTER | Age: 83
End: 2018-02-12
Attending: SURGERY | Admitting: SURGERY
Payer: MEDICARE

## 2018-02-12 VITALS
DIASTOLIC BLOOD PRESSURE: 67 MMHG | OXYGEN SATURATION: 94 % | SYSTOLIC BLOOD PRESSURE: 148 MMHG | RESPIRATION RATE: 16 BRPM | HEART RATE: 76 BPM | WEIGHT: 134.04 LBS | BODY MASS INDEX: 20.31 KG/M2 | HEIGHT: 68 IN | TEMPERATURE: 97.2 F

## 2018-02-12 DIAGNOSIS — G89.18 POSTOPERATIVE PAIN: ICD-10-CM

## 2018-02-12 PROCEDURE — 160025 RECOVERY II MINUTES (STATS): Performed by: SURGERY

## 2018-02-12 PROCEDURE — 160046 HCHG PACU - 1ST 60 MINS PHASE II: Performed by: SURGERY

## 2018-02-12 PROCEDURE — 501583 HCHG TROCAR, THRD CAN&SEAL 5X100: Performed by: SURGERY

## 2018-02-12 PROCEDURE — 501399 HCHG SPECIMAN BAG, ENDO CATC: Performed by: SURGERY

## 2018-02-12 PROCEDURE — 700101 HCHG RX REV CODE 250

## 2018-02-12 PROCEDURE — 700102 HCHG RX REV CODE 250 W/ 637 OVERRIDE(OP)

## 2018-02-12 PROCEDURE — 501838 HCHG SUTURE GENERAL: Performed by: SURGERY

## 2018-02-12 PROCEDURE — 501571 HCHG TROCAR, SEPARATOR 12X100: Performed by: SURGERY

## 2018-02-12 PROCEDURE — 160028 HCHG SURGERY MINUTES - 1ST 30 MINS LEVEL 3: Performed by: SURGERY

## 2018-02-12 PROCEDURE — 160035 HCHG PACU - 1ST 60 MINS PHASE I: Performed by: SURGERY

## 2018-02-12 PROCEDURE — 502571 HCHG PACK, LAP CHOLE: Performed by: SURGERY

## 2018-02-12 PROCEDURE — 160036 HCHG PACU - EA ADDL 30 MINS PHASE I: Performed by: SURGERY

## 2018-02-12 PROCEDURE — 700111 HCHG RX REV CODE 636 W/ 250 OVERRIDE (IP)

## 2018-02-12 PROCEDURE — 500697 HCHG HEMOCLIP, LARGE (ORANGE): Performed by: SURGERY

## 2018-02-12 PROCEDURE — A6402 STERILE GAUZE <= 16 SQ IN: HCPCS | Performed by: SURGERY

## 2018-02-12 PROCEDURE — 160048 HCHG OR STATISTICAL LEVEL 1-5: Performed by: SURGERY

## 2018-02-12 PROCEDURE — 160009 HCHG ANES TIME/MIN: Performed by: SURGERY

## 2018-02-12 PROCEDURE — 88304 TISSUE EXAM BY PATHOLOGIST: CPT

## 2018-02-12 PROCEDURE — 501570 HCHG TROCAR, SEPARATOR: Performed by: SURGERY

## 2018-02-12 PROCEDURE — 160002 HCHG RECOVERY MINUTES (STAT): Performed by: SURGERY

## 2018-02-12 PROCEDURE — A9270 NON-COVERED ITEM OR SERVICE: HCPCS

## 2018-02-12 PROCEDURE — 160039 HCHG SURGERY MINUTES - EA ADDL 1 MIN LEVEL 3: Performed by: SURGERY

## 2018-02-12 PROCEDURE — 500868 HCHG NEEDLE, SURGI(VARES): Performed by: SURGERY

## 2018-02-12 RX ORDER — LIDOCAINE HYDROCHLORIDE 10 MG/ML
0.5 INJECTION, SOLUTION INFILTRATION; PERINEURAL
Status: DISCONTINUED | OUTPATIENT
Start: 2018-02-12 | End: 2018-02-12 | Stop reason: HOSPADM

## 2018-02-12 RX ORDER — SODIUM CHLORIDE, SODIUM LACTATE, POTASSIUM CHLORIDE, CALCIUM CHLORIDE 600; 310; 30; 20 MG/100ML; MG/100ML; MG/100ML; MG/100ML
INJECTION, SOLUTION INTRAVENOUS CONTINUOUS
Status: DISCONTINUED | OUTPATIENT
Start: 2018-02-12 | End: 2018-02-12 | Stop reason: HOSPADM

## 2018-02-12 RX ORDER — LIDOCAINE AND PRILOCAINE 25; 25 MG/G; MG/G
1 CREAM TOPICAL
Status: DISCONTINUED | OUTPATIENT
Start: 2018-02-12 | End: 2018-02-12 | Stop reason: HOSPADM

## 2018-02-12 RX ORDER — LIDOCAINE HYDROCHLORIDE 10 MG/ML
INJECTION, SOLUTION INFILTRATION; PERINEURAL
Status: COMPLETED
Start: 2018-02-12 | End: 2018-02-12

## 2018-02-12 RX ORDER — BUPIVACAINE HYDROCHLORIDE AND EPINEPHRINE 5; 5 MG/ML; UG/ML
INJECTION, SOLUTION EPIDURAL; INTRACAUDAL; PERINEURAL
Status: DISCONTINUED | OUTPATIENT
Start: 2018-02-12 | End: 2018-02-12 | Stop reason: HOSPADM

## 2018-02-12 RX ORDER — OXYCODONE HCL 5 MG/5 ML
SOLUTION, ORAL ORAL
Status: COMPLETED
Start: 2018-02-12 | End: 2018-02-12

## 2018-02-12 RX ORDER — HYDROCODONE BITARTRATE AND ACETAMINOPHEN 5; 325 MG/1; MG/1
1-2 TABLET ORAL EVERY 6 HOURS PRN
Qty: 15 TAB | Refills: 0 | Status: SHIPPED | OUTPATIENT
Start: 2018-02-12 | End: 2018-02-15

## 2018-02-12 RX ADMIN — LIDOCAINE HYDROCHLORIDE 0.5 ML: 10 INJECTION, SOLUTION INFILTRATION; PERINEURAL at 09:45

## 2018-02-12 RX ADMIN — OXYCODONE HYDROCHLORIDE 5 MG: 5 SOLUTION ORAL at 11:40

## 2018-02-12 RX ADMIN — SODIUM CHLORIDE, SODIUM LACTATE, POTASSIUM CHLORIDE, CALCIUM CHLORIDE: 600; 310; 30; 20 INJECTION, SOLUTION INTRAVENOUS at 09:45

## 2018-02-12 ASSESSMENT — PAIN SCALES - GENERAL
PAINLEVEL_OUTOF10: 1
PAINLEVEL_OUTOF10: 0
PAINLEVEL_OUTOF10: ASSUMED PAIN PRESENT
PAINLEVEL_OUTOF10: 0

## 2018-02-12 NOTE — OP REPORT
DATE OF SERVICE:  02/12/2018    PREOPERATIVE DIAGNOSIS:  Biliary dyskinesia.    POSTOPERATIVE DIAGNOSIS:  Biliary dyskinesia.    PROCEDURE PERFORMED:  Laparoscopic cholecystectomy.    SURGEON:  John H. Ganser, MD    ASSISTANT:  Deondre Ghosh PA-C    ANESTHESIA:  General.    ANESTHESIOLOGIST:  Zack Nobles MD    INDICATIONS:  The patient is an 82-year-old female with long history of   epigastric complaints and workup shows biliary dyskinesia.  Risks, benefits,   and alternatives to laparoscopic cholecystectomy were outlined in detail.  All   questions answered and she wished to proceed.    DESCRIPTION OF PROCEDURE:  The patient was identified and general anesthetic   administered.  Her abdomen was prepped and draped in the usual sterile   fashion.  Local anesthesia of 0.5% Marcaine with epinephrine was injected   prior to making skin incision.  A small incision was made in the inferior   aspect of the umbilicus and the Veress needle passed.  The abdomen was   insufflated with carbon dioxide without incident and a 5-mm blunt trocar and   5-mm 30-degree scope inserted.  Epigastric 12 and 2 right subcostal 5 mm   trocars were placed.  The gallbladder was grasped, retracted upwards.  There   were adhesions to the wall and the gallbladder was markedly distended.    Adhesions taken down with cautery.  Circumferential dissection was carried   around the neck of the gallbladder and the cystic duct and cystic artery   dissected out and the anatomy clearly defined.  Two clips were placed   proximally and distally in both the duct and the artery and they were   transected.  The gallbladder was removed from liver surface with   electrocautery, placed in endoscopic bag and withdrawn through the epigastric   trocar site.    The abdomen was irrigated and hemostasis assured.  The trocars were withdrawn.    The abdomen deflated.  The epigastric trocar site was closed with fascial   level with 2-0 Vicryl and skin incisions with  4-0 Vicryl subcuticular sutures.    Sterile dressings were applied.  The patient returned to recovery room in   stable condition.       ____________________________________     JOHN H. GANSER, MD [facility] / PROMISE    DD:  02/12/2018 11:18:39  DT:  02/12/2018 11:47:01    D#:  [id number]  Job#:  [id number]    cc: TALIA TREJO MD, Waqas RAMEY

## 2018-02-12 NOTE — OR NURSING
D/c orders received. IV dc'd. Pt changed into clothing with assistance. Discharge instructions given; pt and family verbalized understanding and questions answered. Prescriptions with pt's son. Pt dc'd in w/c with hospital escort in stable condition.

## 2018-02-12 NOTE — OR SURGEON
Immediate Post OP Note    PreOp Diagnosis: Biliary dyskinesia    PostOp Diagnosis: Same    Procedure(s):  DOREEN BY LAPAROSCOPY - Wound Class: Clean Contaminated    Surgeon(s):  John H Ganser, M.D.    Anesthesiologist/Type of Anesthesia:  Anesthesiologist: Zack Nobles M.D./General    Surgical Staff:  Assistant: SHERMAN Horowitz  Circulator: Kat Chin RSUKHWINDER  Scrub Person: Padmini Andujar; Sameera Stuart    Specimens:  * No specimens in log *    Estimated Blood Loss: 0    Findings: 0    Complications: 0        2/12/2018 11:15 AM John H Ganser

## 2018-02-12 NOTE — OR NURSING
Pt A&OX4. VSS. Pt on room air. Four lap sites to abd, dressings CDI and ice pack in place. Pt has no complaints of nausea, tolerated sips of water. Pt states a little soreness but declines pain post pain medication admin. Script for Norco on chart. Report called to TERRANCE Burns. Pt transported via [place] to Phase II.

## 2018-02-12 NOTE — DISCHARGE INSTRUCTIONS
ACTIVITY: Rest and take it easy for the first 24 hours.  A responsible adult is recommended to remain with you during that time.  It is normal to feel sleepy.  We encourage you to not do anything that requires balance, judgment or coordination.    MILD FLU-LIKE SYMPTOMS ARE NORMAL. YOU MAY EXPERIENCE GENERALIZED MUSCLE ACHES, THROAT IRRITATION, HEADACHE AND/OR SOME NAUSEA.    FOR 24 HOURS DO NOT:  Drive, operate machinery or run household appliances.  Drink beer or alcoholic beverages.   Make important decisions or sign legal documents.    SPECIAL INSTRUCTIONS:   Pt counseled re: diet, activity, home med's, and wound care  Low fat, regular diet x 1-2 days; then advance diet as tolerated  May shower POD #1 over Tegaderms   Ok to remove Tegaderms on POD#4  No driving for 4-5 days  No lifting >15 lbs for 1-2 weeks  F/U with Dr. Ganser in 2 weeks    DIET: To avoid nausea, slowly advance diet as tolerated, avoiding spicy or greasy foods for the first day.  Add more substantial food to your diet according to your physician's instructions.  Babies can be fed formula or breast milk as soon as they are hungry.  INCREASE FLUIDS AND FIBER TO AVOID CONSTIPATION.    SURGICAL DRESSING/BATHING: see above    FOLLOW-UP APPOINTMENT:  A follow-up appointment should be arranged with your doctor in 1-2 weeks; call to schedule.    You should CALL YOUR PHYSICIAN if you develop:  Fever greater than 101 degrees F.  Pain not relieved by medication, or persistent nausea or vomiting.  Excessive bleeding (blood soaking through dressing) or unexpected drainage from the wound.  Extreme redness or swelling around the incision site, drainage of pus or foul smelling drainage.  Inability to urinate or empty your bladder within 8 hours.  Problems with breathing or chest pain.    You should call 911 if you develop problems with breathing or chest pain.  If you are unable to contact your doctor or surgical center, you should go to the nearest  emergency room or urgent care center.  Physician's telephone #: Ganser [phone]    If any questions arise, call your doctor.  If your doctor is not available, please feel free to call the Surgical Center at [phone].  The Center is open Monday through Friday from 7AM to 7PM.  You can also call the HEALTH HOTLINE open 24 hours/day, 7 days/week and speak to a nurse at [phone], or toll free at [phone].    A registered nurse may call you a few days after your surgery to see how you are doing after your procedure.    MEDICATIONS: Resume taking daily medication.  Take prescribed pain medication with food.  If no medication is prescribed, you may take non-aspirin pain medication if needed.  PAIN MEDICATION CAN BE VERY CONSTIPATING.  Take a stool softener or laxative such as senokot, pericolace, or milk of magnesia if needed.    Prescription given for norco.  Last pain medication given at 11:40 am.    If your physician has prescribed pain medication that includes Acetaminophen (Tylenol), do not take additional Acetaminophen (Tylenol) while taking the prescribed medication.    Depression / Suicide Risk    As you are discharged from this [facility] facility, it is important to learn how to keep safe from harming yourself.    Recognize the warning signs:  · Abrupt changes in personality, positive or negative- including increase in energy   · Giving away possessions  · Change in eating patterns- significant weight changes-  positive or negative  · Change in sleeping patterns- unable to sleep or sleeping all the time   · Unwillingness or inability to communicate  · Depression  · Unusual sadness, discouragement and loneliness  · Talk of wanting to die  · Neglect of personal appearance   · Rebelliousness- reckless behavior  · Withdrawal from people/activities they love  · Confusion- inability to concentrate     If you or a loved one observes any of these behaviors or has concerns about self-harm, here's  what you can do:  · Talk about it- your feelings and reasons for harming yourself  · Remove any means that you might use to hurt yourself (examples: pills, rope, extension cords, firearm)  · Get professional help from the community (Mental Health, Substance Abuse, psychological counseling)  · Do not be alone:Call your Safe Contact- someone whom you trust who will be there for you.  · Call your local CRISIS HOTLINE 268-9177 or 820-528-7158  · Call your local Children's Mobile Crisis Response Team [place] (955) 891-3846 or www.NatureBridge  · Call the toll free National Suicide Prevention Hotlines   · National Suicide Prevention Lifeline 147-900-PZXK (8105)  · National Hope Line Network 800-SUICIDE (846-2182)

## 2018-02-28 ENCOUNTER — TELEPHONE (OUTPATIENT)
Dept: RHEUMATOLOGY | Facility: PHYSICIAN GROUP | Age: 83
End: 2018-02-28

## 2018-02-28 NOTE — TELEPHONE ENCOUNTER
Please notify pt that we have refilled her gabapentin for one month  We need to have her switch this particular medication refill to her PCP for next refill however  For her next refill, please ask her PCP

## 2018-03-01 ENCOUNTER — OFFICE VISIT (OUTPATIENT)
Dept: RHEUMATOLOGY | Facility: PHYSICIAN GROUP | Age: 83
End: 2018-03-01
Payer: MEDICARE

## 2018-03-01 VITALS
WEIGHT: 134 LBS | RESPIRATION RATE: 14 BRPM | BODY MASS INDEX: 20.37 KG/M2 | DIASTOLIC BLOOD PRESSURE: 60 MMHG | OXYGEN SATURATION: 96 % | HEART RATE: 94 BPM | SYSTOLIC BLOOD PRESSURE: 120 MMHG | TEMPERATURE: 97.7 F

## 2018-03-01 DIAGNOSIS — D50.9 IRON DEFICIENCY ANEMIA, UNSPECIFIED IRON DEFICIENCY ANEMIA TYPE: ICD-10-CM

## 2018-03-01 DIAGNOSIS — Z79.1 ENCOUNTER FOR LONG-TERM (CURRENT) USE OF NSAIDS: ICD-10-CM

## 2018-03-01 DIAGNOSIS — M81.0 SENILE OSTEOPOROSIS: ICD-10-CM

## 2018-03-01 DIAGNOSIS — M15.9 PRIMARY OSTEOARTHRITIS INVOLVING MULTIPLE JOINTS: ICD-10-CM

## 2018-03-01 DIAGNOSIS — M79.7 FIBROMYALGIA: ICD-10-CM

## 2018-03-01 DIAGNOSIS — E03.9 HYPOTHYROIDISM, UNSPECIFIED TYPE: ICD-10-CM

## 2018-03-01 DIAGNOSIS — M54.2 CERVICALGIA: ICD-10-CM

## 2018-03-01 PROCEDURE — 99214 OFFICE O/P EST MOD 30 MIN: CPT | Performed by: INTERNAL MEDICINE

## 2018-03-01 RX ORDER — ALENDRONATE SODIUM 70 MG/1
TABLET ORAL
Qty: 12 TAB | Refills: 3 | Status: ON HOLD | OUTPATIENT
Start: 2018-03-01 | End: 2018-12-03

## 2018-03-01 NOTE — LETTER
[facility]   [address], [address]  [place] [address]  Phone: [phone]  Fax: [phone]              Encounter Date: 3/1/2018    Dear Dr. Nobles ref. provider found,    It was a pleasure seeing your patient, Susan Menendez, on 3/1/2018. Diagnoses of Primary osteoarthritis involving multiple joints, Encounter for long-term (current) use of NSAIDs, Senile osteoporosis, Iron deficiency anemia, unspecified iron deficiency anemia type, Hypothyroidism, unspecified type, Cervicalgia, and Fibromyalgia were pertinent to this visit.     Please find attached progress note which includes the history I obtained from Ms. Menendez, my physical examination findings, my impression and recommendations.      Once again, it was a pleasure participating in your patient's care.  Please feel free to contact me if you have any questions or if I can be of any further assistance to your patients.      Sincerely,    Zena Benavides M.D.  Electronically Signed          PROGRESS NOTE:  No notes on file

## 2018-03-01 NOTE — PROGRESS NOTES
Chief Complaint- joint pain    Subjective:   Susan Menendez is a 82 y.o. female here today for follow up of rheumatological issues    This is a follow-up patient to this clinic for DJD and osteopenia/osteoporosis. Remote history of PMR which is completely resolved. Previously followed by Dr Gutierrez.  Patient denies any jaw claudication, denies any amaurosis fugax, does have some various aches and pains and does complain of lack of endurance that has been ongoing for many years. Patient states that she used to be quite active in sports, patient was last active in sports at 50 years of age about 30 years ago. Patient states at that time she had been diagnosed with fibromyalgia. Patient continues her complaints of lack of endurance, recently status post gallbladder surgery 2 weeks ago, and is available more achy secondary to weather changes, patient currently on  meloxicam 15 mg by mouth daily when necessary. Of note ESR 12/2017=10    Status post MRI of lumbar sacral spine that showed degenerative disc disease, status post physical therapy of lumbar sacral spine and C-spine and also status post epidurals from her pain clinic, patient currently seeing Dr. Yoo pain clinic.      Uric acid 3.4 2/2013  RF neg 2/2013  CCP neg 2/2015  MARLEE neg 2/2015  DEXA 10/2014 T scores -2.3, -1.2  DEXA 11/8/2016 T scores -0.8, -2.3    FRAX 11/8/2016 major osteoporotic fracture risk is 23.8%, hip fracture risk 8.4%  MRI LS spine 10/2016-indicates DJD and mild to moderate neural foraminal narrowing     Current medicines (including changes today)  Current Outpatient Prescriptions   Medication Sig Dispense Refill   • Probiotic Product (ALIGN) Cap Take 1 Cap by mouth every day.     • Mirabegron ER (MYRBETRIQ) 50 MG TABLET SR 24 HR Take 1 Tab by mouth every day.     • gabapentin (NEURONTIN) 100 MG Cap TAKE ONE CAPSULE BY MOUTH AT BEDTIME 90 Cap 0   • meloxicam (MOBIC) 15 MG tablet 1 tab po qday with food for joint pain 90 Tab 1   •  Estradiol (ESTRACE PO) Take 1 Tab by mouth every evening.     • thyroid (ARMOUR THYROID) 30 MG Tab Take 30 mg by mouth every day.     • trazodone (DESYREL) 100 MG TABS Take 100 mg by mouth every evening.     • Cholecalciferol (VITAMIN D PO) Take 1 Tab by mouth every day.     • B Complex Vitamins (VITAMIN B COMPLEX PO) Take 1 Tab by mouth every day.     • LYSINE PO Take 1 Tab by mouth every day.     • pentosan (ELMIRON) 100 MG CAPS Take 100 mg by mouth 3 times a day before meals.     • Estradiol (VAGIFEM VA) Insert  in vagina See Admin Instructions. 3 X weekly     • gabapentin (NEURONTIN) 100 MG Cap 1 tab po qday 30 Cap 0   • duloxetine (CYMBALTA) 60 MG CPEP Take 60 mg by mouth every day.     • Multiple Vitamins-Minerals (EYE VITAMINS PO) Take 1 Tab by mouth every day.     • Hyaluronic Acid-Vitamin C (HYALURONIC ACID PO) Take 1 Tab by mouth every day.     • Multiple Vitamins-Minerals (BONE SMART PO) Take 1 Tab by mouth every day.       No current facility-administered medications for this visit.      She  has a past medical history of Anesthesia; Arthritis (02/06/2018); Chronic diarrhea; Chronic UTI; Cold (01/2018); Depression; History of cataract surgery; Hypothyroid; Interstitial cystitis; Other specified disorder of intestines; Pain; Urinary bladder disorder; and Varicose veins.    ROS   Other than what is mentioned in HPI or physical exam, there is no history of headaches, double vision or blurred vision. No temporal tenderness or jaw claudication. No history of cataracts or glaucoma. No trouble swallowing difficulties or sore throats. No history of thyroid disease. No chest complaints including chest pain, cough or sputum production. No shortness of breath. No GI complaints including nausea, vomiting, change in bowel habits, or past peptic ulcer disease. No history of blood in the stools. No urinary complaints including dysuria or frequency. No history of rash including psoriasis. No history of alopecia,  photosensitivity, oral ulcerations, Raynaud's phenomena, or swollen joints. No history of gout. No back complaints. No history of low blood counts.       Objective:     Blood pressure 120/60, pulse 94, temperature 36.5 °C (97.7 °F), resp. rate 14, weight 60.8 kg (134 lb), SpO2 96 %. Body mass index is 20.37 kg/m².   Physical Exam:  GENERAL: Thin  female Alert and oriented x 3, No apparent distress. SKIN: No inflammation, normal turgor, no rashes. HEENT: Atraumatic, unremarkable. PERRLA, extraocular movements are intact. Conjunctiva clear. Fundi not examined. Temporal arteries are palpable and non-tender. THROAT: Clear. NECK: Supple with good range of motion including flexion, extension, lateral rotation and bending. No JVD, thyromegaly or adenopathy is appreciated. Carotids are palpable, no bruits. CHEST: Clear to ausculation and percussion bilaterally, no rales or rhonchi. Respiratory efforts good. BREASTS: Not examined. COR: Regular rate and rhythm. No murmurs, rubs or gallops. ABDOMEN: Soft, non-tender, non-distended. Normal active bowel sounds. No organomegaly appreciated. No hepatomegaly. EXTREMITIES: No clubbing, cyanosis, edema. MUSCULOSKELETAL: Both shoulders have full range of motion including internal and external rotation and abduction. Both elbows have full range of motion without active active synovitis. The wrists have good range of motion without limitations. The small joints of the hands are unremarkable without significant swelling, patient does have some mild squaring of the thumb CMC joints with right worse than left and some mild tenderness along the right CMC joint. There are no tophi or nodules appreciated. The hips, knees, ankles and feet all have good range of motion. Low back is normal. There is no SI tenderness to compression or palpation. There is good lumbar flexion. Patient does have tender points in the upper back and neck and shoulders and elbows bilaterally. RECTAL: Not done.  : Not done. NEUROLOGICAL: Grossly intact. Motor and sensory examinations are normal. PULSES. No carotid bruits, no subclavian bruits, no abdominal bruits, no inguinal bruits, peripheral pulses radial/ulnar, dorsalis pedis and posterior tibial are all 2-3+ over 4 and equal.    Lab Results   Component Value Date/Time    SODIUM 137 02/06/2018 09:47 AM    POTASSIUM 4.1 02/06/2018 09:47 AM    CHLORIDE 104 02/06/2018 09:47 AM    CO2 24 02/06/2018 09:47 AM    GLUCOSE 51 (L) 02/06/2018 09:47 AM    BUN 18 02/06/2018 09:47 AM    CREATININE 0.83 02/06/2018 09:47 AM    CREATININE 0.9 01/04/2005 12:05 PM      Lab Results   Component Value Date/Time    WBC 5.5 02/06/2018 09:47 AM    RBC 3.83 (L) 02/06/2018 09:47 AM    HEMOGLOBIN 11.9 (L) 02/06/2018 09:47 AM    HEMATOCRIT 35.9 (L) 02/06/2018 09:47 AM    MCV 93.7 02/06/2018 09:47 AM    MCH 31.1 02/06/2018 09:47 AM    MCHC 33.1 (L) 02/06/2018 09:47 AM    MPV 10.3 02/06/2018 09:47 AM    NEUTSPOLYS 54.40 12/07/2017 11:02 AM    LYMPHOCYTES 34.50 12/07/2017 11:02 AM    MONOCYTES 8.90 12/07/2017 11:02 AM    EOSINOPHILS 1.10 12/07/2017 11:02 AM    BASOPHILS 0.90 12/07/2017 11:02 AM      Lab Results   Component Value Date/Time    CALCIUM 9.2 02/06/2018 09:47 AM    ASTSGOT 21 09/08/2017 12:15 PM    ALTSGPT 14 09/08/2017 12:15 PM    ALKPHOSPHAT 85 09/08/2017 12:15 PM    TBILIRUBIN 0.4 09/08/2017 12:15 PM    ALBUMIN 3.9 09/08/2017 12:15 PM    ALBUMIN 4.29 03/05/2013 09:00 AM    TOTPROTEIN 7.0 09/08/2017 12:15 PM    TOTPROTEIN 7.60 03/05/2013 09:00 AM     Lab Results   Component Value Date/Time    URICACID 3.7 03/04/2017 09:45 AM    RHEUMFACTN 8 02/27/2013 10:56 AM    CCPANTIBODY 3 02/27/2015 08:44 AM    ANTINUCAB None Detected 02/27/2015 08:44 AM     Lab Results   Component Value Date/Time    SEDRATEWES 10 12/07/2017 11:02 AM     Lab Results   Component Value Date/Time    HBA1C 5.5 05/19/2014 09:25 AM     Lab Results   Component Value Date/Time    CPKTOTAL 84 11/30/2016 10:32 AM     Results  for orders placed during the hospital encounter of 11/08/16   DS-BONE DENSITY STUDY (DEXA)    Impression According to the World Health Organization classification, bone mineral density of this patient is osteopenia with increased fracture risk.        10-year Probability of Fracture:  Major Osteoporotic     23.8%  Hip     8.4%  Population      [place] ()    Based on left femur neck BMD          INTERPRETING LOCATION:  [address] [place] [address], [id number]     Results for orders placed during the hospital encounter of 06/09/10   DX-HAND 3+     Results for orders placed during the hospital encounter of 05/18/16   DX-WRIST-COMPLETE 3+ RIGHT    Impression Degenerative changes of the wrist and base of the thumb without definite acute osseous abnormality.     Results for orders placed during the hospital encounter of 10/10/16   MR-LUMBAR SPINE-W/O    Impression 1.  Mild grade 1 spondylolisthesis at the L4-5 level.    2.  Mild degenerative retrolisthesis at the L1-2 level with moderate discal and marked endplate degenerative changes.    3.  Mild multilevel lumbar spondylotic change.    4.  Small 5 mm synovial cyst adjacent to the medial aspect of the left L4-5 facet joint.    5.  Mild to moderate neural foraminal narrowing at the L4-5 and L5-S1 levels.     Results for orders placed during the hospital encounter of 06/09/10   DX-CERVICAL SPINE-2 OR 3 VIEWS     Assessment and Plan:     1. Primary osteoarthritis involving multiple joints  Okay to continue meloxicam 15 mg by mouth daily when necessary, patient will need CBC and CMP every 6 months while on chronic NSAIDs.    2. Encounter for long-term (current) use of NSAIDs  CBC and CMP every 6 months    3. Senile osteoporosis  Last DEXA over 2 years ago, will schedule DEXA   continue calcium 1200 mg by mouth daily and vitamin D about 1000 units by mouth daily and magnesium 400 mg by mouth daily  Anticipates starting a bisphosphonate or Prolia.  - DS-BONE DENSITY STUDY  (DEXA); Future    4. Iron deficiency anemia, unspecified iron deficiency anemia type  Patient takes plant-based iron and recent iron level September 2017 was normal    5. Hypothyroidism, unspecified type  Can exacerbate joint pain, I recommend monitoring thyroid on a regular basis to assure it is not contributing to the patient's joint pain    6. Cervicalgia  Followed by pain clinic doctor Fredo    7. Fibromyalgia  Currently on gabapentin    Followup: Return in about 6 months (around 9/1/2018). or sooner prn    Patient was seen 30 minutes face-to-face of which more than 50% of the time was spent counseling the patient (excluding time for procedures)  regarding  rheumatological condition and care. Therapy was discussed in detail.    Please note that this dictation was created using voice recognition software. I have made every reasonable attempt to correct obvious errors, but I expect that there are errors of grammar and possibly content that I did not discover before finalizing the note.             Called patient, advised patient that bone density scan from November 2016 indicates osteoporosis by FRAX score, patient already status post Prolia with side effects, we will do a trial of Fosamax 70 mg by mouth every week, patient states understanding

## 2018-03-06 ENCOUNTER — HOSPITAL ENCOUNTER (OUTPATIENT)
Dept: RADIOLOGY | Facility: MEDICAL CENTER | Age: 83
End: 2018-03-06
Attending: INTERNAL MEDICINE
Payer: MEDICARE

## 2018-03-06 DIAGNOSIS — M81.0 SENILE OSTEOPOROSIS: ICD-10-CM

## 2018-03-06 PROCEDURE — 77080 DXA BONE DENSITY AXIAL: CPT

## 2018-03-07 ENCOUNTER — HOSPITAL ENCOUNTER (OUTPATIENT)
Dept: RADIOLOGY | Facility: MEDICAL CENTER | Age: 83
End: 2018-03-07
Attending: PHYSICAL MEDICINE & REHABILITATION
Payer: MEDICARE

## 2018-03-07 ENCOUNTER — TELEPHONE (OUTPATIENT)
Dept: RHEUMATOLOGY | Facility: PHYSICIAN GROUP | Age: 83
End: 2018-03-07

## 2018-03-07 DIAGNOSIS — M81.0 SENILE OSTEOPOROSIS: ICD-10-CM

## 2018-03-07 DIAGNOSIS — M54.2 CERVICALGIA: ICD-10-CM

## 2018-03-07 PROCEDURE — 72141 MRI NECK SPINE W/O DYE: CPT

## 2018-03-07 RX ORDER — ALENDRONATE SODIUM 70 MG/1
TABLET ORAL
Qty: 12 TAB | Refills: 3 | Status: SHIPPED | OUTPATIENT
Start: 2018-03-07 | End: 2018-11-28

## 2018-03-07 NOTE — TELEPHONE ENCOUNTER
Please notify patient when we received the results of her bone density scan and it does show osteoporosis  I think patient just started Fosamax 70 mg by mouth every week, we will continue this and we'll check her bone density scan again in 2 years   If patient's been on Fosamax for the last year then we'll need to switch to Prolia   continue calcium 1200 mg by mouth daily and vitamin D about 1000 units by mouth daily and magnesium 400 mg by mouth daily

## 2018-03-07 NOTE — TELEPHONE ENCOUNTER
Spoke with patient and relayed your message. She will continue the Fosamax and start on the vitamins. Thank you

## 2018-04-20 ENCOUNTER — HOSPITAL ENCOUNTER (OUTPATIENT)
Dept: LAB | Facility: MEDICAL CENTER | Age: 83
End: 2018-04-20
Attending: INTERNAL MEDICINE
Payer: MEDICARE

## 2018-04-20 ENCOUNTER — OFFICE VISIT (OUTPATIENT)
Dept: URGENT CARE | Facility: PHYSICIAN GROUP | Age: 83
End: 2018-04-20
Payer: MEDICARE

## 2018-04-20 VITALS
TEMPERATURE: 97.9 F | HEIGHT: 68 IN | WEIGHT: 134 LBS | RESPIRATION RATE: 16 BRPM | HEART RATE: 77 BPM | BODY MASS INDEX: 20.31 KG/M2 | OXYGEN SATURATION: 98 % | SYSTOLIC BLOOD PRESSURE: 124 MMHG | DIASTOLIC BLOOD PRESSURE: 68 MMHG

## 2018-04-20 DIAGNOSIS — L97.511 SKIN ULCER OF TOE OF RIGHT FOOT, LIMITED TO BREAKDOWN OF SKIN (HCC): ICD-10-CM

## 2018-04-20 DIAGNOSIS — Z79.1 ENCOUNTER FOR LONG-TERM (CURRENT) USE OF NSAIDS: ICD-10-CM

## 2018-04-20 DIAGNOSIS — M85.89 OSTEOPENIA OF MULTIPLE SITES: ICD-10-CM

## 2018-04-20 DIAGNOSIS — M15.9 PRIMARY OSTEOARTHRITIS INVOLVING MULTIPLE JOINTS: ICD-10-CM

## 2018-04-20 DIAGNOSIS — B35.1 TINEA UNGUIUM: ICD-10-CM

## 2018-04-20 LAB
ALBUMIN SERPL BCP-MCNC: 3.9 G/DL (ref 3.2–4.9)
ALBUMIN/GLOB SERPL: 1.3 G/DL
ALP SERPL-CCNC: 69 U/L (ref 30–99)
ALT SERPL-CCNC: 14 U/L (ref 2–50)
ANION GAP SERPL CALC-SCNC: 6 MMOL/L (ref 0–11.9)
AST SERPL-CCNC: 17 U/L (ref 12–45)
BASOPHILS # BLD AUTO: 0.8 % (ref 0–1.8)
BASOPHILS # BLD: 0.05 K/UL (ref 0–0.12)
BILIRUB SERPL-MCNC: 0.3 MG/DL (ref 0.1–1.5)
BUN SERPL-MCNC: 19 MG/DL (ref 8–22)
CALCIUM SERPL-MCNC: 9.3 MG/DL (ref 8.5–10.5)
CHLORIDE SERPL-SCNC: 102 MMOL/L (ref 96–112)
CO2 SERPL-SCNC: 24 MMOL/L (ref 20–33)
CREAT SERPL-MCNC: 0.85 MG/DL (ref 0.5–1.4)
EOSINOPHIL # BLD AUTO: 0.12 K/UL (ref 0–0.51)
EOSINOPHIL NFR BLD: 1.8 % (ref 0–6.9)
ERYTHROCYTE [DISTWIDTH] IN BLOOD BY AUTOMATED COUNT: 46.7 FL (ref 35.9–50)
GLOBULIN SER CALC-MCNC: 3.1 G/DL (ref 1.9–3.5)
GLUCOSE SERPL-MCNC: 78 MG/DL (ref 65–99)
HCT VFR BLD AUTO: 35.1 % (ref 37–47)
HGB BLD-MCNC: 11.8 G/DL (ref 12–16)
IMM GRANULOCYTES # BLD AUTO: 0.02 K/UL (ref 0–0.11)
IMM GRANULOCYTES NFR BLD AUTO: 0.3 % (ref 0–0.9)
LYMPHOCYTES # BLD AUTO: 1.74 K/UL (ref 1–4.8)
LYMPHOCYTES NFR BLD: 26.4 % (ref 22–41)
MCH RBC QN AUTO: 31.2 PG (ref 27–33)
MCHC RBC AUTO-ENTMCNC: 33.6 G/DL (ref 33.6–35)
MCV RBC AUTO: 92.9 FL (ref 81.4–97.8)
MONOCYTES # BLD AUTO: 0.63 K/UL (ref 0–0.85)
MONOCYTES NFR BLD AUTO: 9.5 % (ref 0–13.4)
NEUTROPHILS # BLD AUTO: 4.04 K/UL (ref 2–7.15)
NEUTROPHILS NFR BLD: 61.2 % (ref 44–72)
NRBC # BLD AUTO: 0 K/UL
NRBC BLD-RTO: 0 /100 WBC
PLATELET # BLD AUTO: 296 K/UL (ref 164–446)
PMV BLD AUTO: 10.1 FL (ref 9–12.9)
POTASSIUM SERPL-SCNC: 3.7 MMOL/L (ref 3.6–5.5)
PROT SERPL-MCNC: 7 G/DL (ref 6–8.2)
RBC # BLD AUTO: 3.78 M/UL (ref 4.2–5.4)
SODIUM SERPL-SCNC: 132 MMOL/L (ref 135–145)
WBC # BLD AUTO: 6.6 K/UL (ref 4.8–10.8)

## 2018-04-20 PROCEDURE — 80053 COMPREHEN METABOLIC PANEL: CPT

## 2018-04-20 PROCEDURE — 99213 OFFICE O/P EST LOW 20 MIN: CPT | Performed by: EMERGENCY MEDICINE

## 2018-04-20 PROCEDURE — 85025 COMPLETE CBC W/AUTO DIFF WBC: CPT

## 2018-04-20 PROCEDURE — 36415 COLL VENOUS BLD VENIPUNCTURE: CPT

## 2018-04-22 ASSESSMENT — ENCOUNTER SYMPTOMS
CHILLS: 0
SPEECH CHANGE: 0
COUGH: 0
NAUSEA: 0
EYE REDNESS: 0
NERVOUS/ANXIOUS: 0
VOMITING: 0
MYALGIAS: 0
FEVER: 0
SENSORY CHANGE: 0
EYE DISCHARGE: 0

## 2018-04-23 ENCOUNTER — TELEPHONE (OUTPATIENT)
Dept: URGENT CARE | Facility: PHYSICIAN GROUP | Age: 83
End: 2018-04-23

## 2018-04-23 NOTE — PROGRESS NOTES
Subjective:      Susan Menendez is a 82 y.o. female who presents with Toe Pain (wound on end and under side of 3rd toe)            HPI  Pt with c/o of thickened nail on the 2nd and 3rd toes and an ulcer on the plantar surface of the right third toe.  Allergies   Allergen Reactions   • Amoxicillin      Unknown reaction   • Augmentin      Stomach ache     • Ceftin [Cefuroxime Axetil]      Unknown rxn   • Cipro Xr Unspecified     unknown   • Levaquin      Doesn't work     • Morphine Nausea     extreme   • Pcn [Penicillins]      unknown   • Sulfa Drugs      Unknown rxn     Social History     Social History   • Marital status:      Spouse name: N/A   • Number of children: N/A   • Years of education: N/A     Occupational History   • Not on file.     Social History Main Topics   • Smoking status: Former Smoker     Years: 2.00     Types: Cigarettes   • Smokeless tobacco: Never Used   • Alcohol use Yes      Comment: occasionally   • Drug use: No   • Sexual activity: Not on file     Other Topics Concern   • Not on file     Social History Narrative   • No narrative on file     Past Medical History:   Diagnosis Date   • Anesthesia     nausea   • Arthritis 02/06/2018    osteo in spine, hands, neck   • Chronic diarrhea    • Chronic UTI    • Cold 01/2018   • Depression    • History of cataract surgery     left   • Hypothyroid    • Interstitial cystitis    • Other specified disorder of intestines    • Pain    • Urinary bladder disorder    • Varicose veins      Current Outpatient Prescriptions on File Prior to Visit   Medication Sig Dispense Refill   • Probiotic Product (ALIGN) Cap Take 1 Cap by mouth every day.     • Mirabegron ER (MYRBETRIQ) 50 MG TABLET SR 24 HR Take 1 Tab by mouth every day.     • gabapentin (NEURONTIN) 100 MG Cap TAKE ONE CAPSULE BY MOUTH AT BEDTIME 90 Cap 0   • Estradiol (ESTRACE PO) Take 1 Tab by mouth every evening.     • thyroid (ARMOUR THYROID) 30 MG Tab Take 30 mg by mouth every day.     •  duloxetine (CYMBALTA) 60 MG CPEP Take 60 mg by mouth every day.     • trazodone (DESYREL) 100 MG TABS Take 100 mg by mouth every evening.     • Cholecalciferol (VITAMIN D PO) Take 1 Tab by mouth every day.     • Multiple Vitamins-Minerals (EYE VITAMINS PO) Take 1 Tab by mouth every day.     • B Complex Vitamins (VITAMIN B COMPLEX PO) Take 1 Tab by mouth every day.     • LYSINE PO Take 1 Tab by mouth every day.     • Hyaluronic Acid-Vitamin C (HYALURONIC ACID PO) Take 1 Tab by mouth every day.     • Multiple Vitamins-Minerals (BONE SMART PO) Take 1 Tab by mouth every day.     • pentosan (ELMIRON) 100 MG CAPS Take 100 mg by mouth 3 times a day before meals.     • Estradiol (VAGIFEM VA) Insert  in vagina See Admin Instructions. 3 X weekly     • meloxicam (MOBIC) 15 MG tablet TAKE ONE TABLET BY MOUTH ONCE DAILY WITH  FOOD  FOR  JOINT  PAIN 30 Tab 0   • alendronate (FOSAMAX) 70 MG Tab One tab by mouth in the morning on empty stomach one day week, take with 8 ounces of water, do not lie down for 30 minutes after taking medication 12 Tab 3   • alendronate (FOSAMAX) 70 MG Tab One tab by mouth in a.m. every 7 days on an empty stomach with 8 ounces of water, do not lie down for 30 minutes after taking medication 12 Tab 3   • gabapentin (NEURONTIN) 100 MG Cap 1 tab po qday 30 Cap 0     No current facility-administered medications on file prior to visit.    ,No family history on file.  Review of Systems   Constitutional: Negative for chills, fever and malaise/fatigue.   Eyes: Negative for discharge and redness.   Respiratory: Negative for cough.    Cardiovascular: Negative for chest pain.   Gastrointestinal: Negative for nausea and vomiting.   Genitourinary: Negative.    Musculoskeletal: Negative for joint pain and myalgias.   Skin: Negative for rash.        ulcer on plantar surface of right third toe.   Neurological: Negative for sensory change and speech change.   Psychiatric/Behavioral: The patient is not nervous/anxious.   "         Objective:     /68   Pulse 77   Temp 36.6 °C (97.9 °F)   Resp 16   Ht 1.715 m (5' 7.5\")   Wt 60.8 kg (134 lb)   SpO2 98%   BMI 20.68 kg/m²      Physical Exam   Constitutional: She appears well-developed and well-nourished. No distress.   HENT:   Head: Normocephalic and atraumatic.   Right Ear: External ear normal.   Left Ear: External ear normal.   Eyes: Right eye exhibits no discharge. Left eye exhibits no discharge.   Cardiovascular: Normal rate.    Pulmonary/Chest: Effort normal and breath sounds normal.   Musculoskeletal: She exhibits tenderness. She exhibits no edema.   Right toes with tinea of 2nd and 3ed hails and small ulcer on plantar surface of third toe with thickened skin over tip of toe, No cellulitis.   Skin: She is not diaphoretic.   Nursing note and vitals reviewed.              Assessment/Plan:     1. Skin ulcer of toe of right foot, limited to breakdown of skin (CMS-HCC)    - mupirocin (BACTROBAN) 2 % Ointment; Apply 1 Application to affected area(s) 2 times a day.  Dispense: 1 Tube; Refill: 0  - REFERRAL TO PODIATRY  - Tdap =>8yo IM    2. Tinea unguium    - REFERRAL TO PODIATRY      "

## 2018-04-24 ENCOUNTER — TELEPHONE (OUTPATIENT)
Dept: RHEUMATOLOGY | Facility: PHYSICIAN GROUP | Age: 83
End: 2018-04-24

## 2018-04-24 NOTE — TELEPHONE ENCOUNTER
Melonie from the lab called. She is needing to know how often you want this pt to have the CBC?  You ordered a standing order on that...       [phone]  Melonie medina

## 2018-05-23 ENCOUNTER — APPOINTMENT (RX ONLY)
Dept: URBAN - METROPOLITAN AREA CLINIC 4 | Facility: CLINIC | Age: 83
Setting detail: DERMATOLOGY
End: 2018-05-23

## 2018-05-23 DIAGNOSIS — L20.89 OTHER ATOPIC DERMATITIS: ICD-10-CM

## 2018-05-23 PROBLEM — F32.9 MAJOR DEPRESSIVE DISORDER, SINGLE EPISODE, UNSPECIFIED: Status: ACTIVE | Noted: 2018-05-23

## 2018-05-23 PROBLEM — F41.9 ANXIETY DISORDER, UNSPECIFIED: Status: ACTIVE | Noted: 2018-05-23

## 2018-05-23 PROBLEM — L20.84 INTRINSIC (ALLERGIC) ECZEMA: Status: ACTIVE | Noted: 2018-05-23

## 2018-05-23 PROCEDURE — ? COUNSELING: TOPICAL STEROIDS

## 2018-05-23 PROCEDURE — 99213 OFFICE O/P EST LOW 20 MIN: CPT

## 2018-05-23 PROCEDURE — ? PRESCRIPTION

## 2018-05-23 PROCEDURE — ? COUNSELING

## 2018-05-23 RX ORDER — TRIAMCINOLONE ACETONIDE 1 MG/G
OINTMENT TOPICAL BID
Qty: 1 | Refills: 3 | Status: ERX | COMMUNITY
Start: 2018-05-23

## 2018-05-23 RX ADMIN — TRIAMCINOLONE ACETONIDE: 1 OINTMENT TOPICAL at 21:40

## 2018-05-23 ASSESSMENT — LOCATION SIMPLE DESCRIPTION DERM
LOCATION SIMPLE: LEFT UPPER BACK
LOCATION SIMPLE: UPPER BACK
LOCATION SIMPLE: CHEST
LOCATION SIMPLE: LEFT POSTERIOR THIGH
LOCATION SIMPLE: LEFT THIGH
LOCATION SIMPLE: LEFT CALF
LOCATION SIMPLE: LEFT PRETIBIAL REGION

## 2018-05-23 ASSESSMENT — LOCATION DETAILED DESCRIPTION DERM
LOCATION DETAILED: LEFT ANTERIOR DISTAL THIGH
LOCATION DETAILED: LEFT DISTAL CALF
LOCATION DETAILED: LEFT PROXIMAL PRETIBIAL REGION
LOCATION DETAILED: INFERIOR THORACIC SPINE
LOCATION DETAILED: LEFT ANTERIOR PROXIMAL THIGH
LOCATION DETAILED: MIDDLE STERNUM
LOCATION DETAILED: LEFT MEDIAL UPPER BACK
LOCATION DETAILED: LEFT PROXIMAL POSTERIOR THIGH

## 2018-05-23 ASSESSMENT — LOCATION ZONE DERM
LOCATION ZONE: LEG
LOCATION ZONE: TRUNK

## 2018-05-23 NOTE — PROCEDURE: COUNSELING: TOPICAL STEROIDS
Detail Level: Generalized
Topical Steroids Counseling: I discussed with the patient that prolonged use of topical steroids can result in the increased appearance of superficial blood vessels (telangiectasias), lightening (hypopigmentation) and thinning of the skin (atrophy).  Patient understands to avoid using high potency steroids in skin folds, the groin or the face.  The patient verbalized understanding of the proper use and possible adverse effects of topical steroids.  All of the patient's questions and concerns were addressed.

## 2018-07-05 ENCOUNTER — HOSPITAL ENCOUNTER (OUTPATIENT)
Dept: RADIOLOGY | Facility: MEDICAL CENTER | Age: 83
End: 2018-07-05
Attending: PHYSICAL MEDICINE & REHABILITATION
Payer: MEDICARE

## 2018-07-05 DIAGNOSIS — M54.5 LOW BACK PAIN, UNSPECIFIED BACK PAIN LATERALITY, UNSPECIFIED CHRONICITY, WITH SCIATICA PRESENCE UNSPECIFIED: ICD-10-CM

## 2018-07-05 DIAGNOSIS — M54.30 SCIATICA, UNSPECIFIED LATERALITY: ICD-10-CM

## 2018-07-05 DIAGNOSIS — M54.17 LUMBOSACRAL NEURITIS: ICD-10-CM

## 2018-07-05 PROCEDURE — 72148 MRI LUMBAR SPINE W/O DYE: CPT

## 2018-07-25 ENCOUNTER — HOSPITAL ENCOUNTER (OUTPATIENT)
Dept: RADIOLOGY | Facility: MEDICAL CENTER | Age: 83
End: 2018-07-25
Attending: PHYSICAL MEDICINE & REHABILITATION
Payer: MEDICARE

## 2018-07-25 DIAGNOSIS — M54.50 ACUTE LOW BACK PAIN WITHOUT SCIATICA, UNSPECIFIED BACK PAIN LATERALITY: ICD-10-CM

## 2018-07-25 PROCEDURE — 72100 X-RAY EXAM L-S SPINE 2/3 VWS: CPT

## 2018-08-29 ENCOUNTER — HOSPITAL ENCOUNTER (OUTPATIENT)
Dept: RADIOLOGY | Facility: MEDICAL CENTER | Age: 83
End: 2018-08-29
Attending: CLINICAL NURSE SPECIALIST
Payer: MEDICARE

## 2018-08-29 DIAGNOSIS — M54.16 LUMBAR RADICULOPATHY: ICD-10-CM

## 2018-08-29 PROCEDURE — 72100 X-RAY EXAM L-S SPINE 2/3 VWS: CPT

## 2018-09-10 ENCOUNTER — TELEPHONE (OUTPATIENT)
Dept: RHEUMATOLOGY | Facility: PHYSICIAN GROUP | Age: 83
End: 2018-09-10

## 2018-09-10 ENCOUNTER — OFFICE VISIT (OUTPATIENT)
Dept: RHEUMATOLOGY | Facility: PHYSICIAN GROUP | Age: 83
End: 2018-09-10
Payer: MEDICARE

## 2018-09-10 VITALS
OXYGEN SATURATION: 96 % | TEMPERATURE: 98.4 F | BODY MASS INDEX: 21.14 KG/M2 | DIASTOLIC BLOOD PRESSURE: 70 MMHG | SYSTOLIC BLOOD PRESSURE: 140 MMHG | WEIGHT: 137 LBS | HEART RATE: 84 BPM | RESPIRATION RATE: 14 BRPM

## 2018-09-10 DIAGNOSIS — E03.9 HYPOTHYROIDISM, UNSPECIFIED TYPE: ICD-10-CM

## 2018-09-10 DIAGNOSIS — M81.0 SENILE OSTEOPOROSIS: ICD-10-CM

## 2018-09-10 DIAGNOSIS — G89.29 CHRONIC BILATERAL LOW BACK PAIN WITH LEFT-SIDED SCIATICA: ICD-10-CM

## 2018-09-10 DIAGNOSIS — M35.3 PMR (POLYMYALGIA RHEUMATICA) (HCC): ICD-10-CM

## 2018-09-10 DIAGNOSIS — M54.42 CHRONIC BILATERAL LOW BACK PAIN WITH LEFT-SIDED SCIATICA: ICD-10-CM

## 2018-09-10 DIAGNOSIS — M79.7 FIBROMYALGIA: ICD-10-CM

## 2018-09-10 PROCEDURE — 99214 OFFICE O/P EST MOD 30 MIN: CPT | Performed by: INTERNAL MEDICINE

## 2018-09-10 RX ORDER — CELECOXIB 200 MG/1
200 CAPSULE ORAL 2 TIMES DAILY
COMMUNITY
End: 2018-11-28

## 2018-09-10 NOTE — LETTER
[facility]   [address], [address]  [place] [address]  Phone: [phone]  Fax: [phone]              Encounter Date: 9/10/2018    Dear Dr. Nobles ref. provider found,    It was a pleasure seeing your patient, Susan Menendez, on 9/10/2018. Diagnoses of hx of PMR (polymyalgia rheumatica) (HCC), Senile osteoporosis, Chronic bilateral low back pain with left-sided sciatica, Fibromyalgia, and Hypothyroidism, unspecified type were pertinent to this visit.     Please find attached progress note which includes the history I obtained from Ms. Menendez, my physical examination findings, my impression and recommendations.      Once again, it was a pleasure participating in your patient's care.  Please feel free to contact me if you have any questions or if I can be of any further assistance to your patients.      Sincerely,    Zena Benavides M.D.  Electronically Signed          PROGRESS NOTE:  No notes on file

## 2018-09-10 NOTE — TELEPHONE ENCOUNTER
Pt came back in to the office after her appointment with you today 9/10/18. She forgot to ask you if it would be okay for her to get another epidural for her back pain if she needs it. She is concerned that it will worsen her Osteoporosis. Please advise.    Thank you

## 2018-09-10 NOTE — TELEPHONE ENCOUNTER
Corticosteroid epidurals are localized injections and less likely to cause diffuse osteoporosis, I would recommend it if she needs to have it done.  It would not hurt to mention to the pain doctor who is doing the epidurals that she has osteoporosis.    Additionally patient is being treated for osteoporosis with Fosamax so that should help.

## 2018-09-11 NOTE — PROGRESS NOTES
Chief Complaint- joint pain    Subjective:   Susan Menendez is a 83 y.o. female here today for follow up of rheumatological issues    This is a follow-up visit for this patient who we see for DJD and osteopenia/osteoporosis.  Patient does have a remote history of PMR which is completely resolved with normal ESR.  Patient denies any jaw claudication, denies any amaurosis fugax, denies any fevers of unknown etiology, denies any unexplained weight loss.       Status post MRI of lumbar sacral spine that showed degenerative disc disease, currently undergoing physical therapy of lumbar sacral spine and C-spine and also status post epidurals from her pain clinic, patient currently seeing Dr. Yoo pain clinic who has the patient on Celebrex.   Patient is scheduled to see neurosurgery coming up in the next month.     Uric acid 3.4 2/2013  RF neg 2/2013  CCP neg 2/2015  MARLEE neg 2/2015  DEXA 10/2014 T scores -2.3, -1.2  DEXA 11/8/2016 T scores -0.8, -2.3    FRAX 11/8/2016 major osteoporotic fracture risk is 23.8%, hip fracture risk 8.4%  DEXA 3/7/2018 T scores -0.6, -2.1  FRAX 3/7/2018 major osteoporotic fracture risk 26.1%, hip fracture risk 9.5%  Patient has been on Fosamax since March 2018  MRI LS spine 10/2016-indicates DJD and mild to moderate neural foraminal narrowing         Current medicines (including changes today)  Current Outpatient Prescriptions   Medication Sig Dispense Refill   • celecoxib (CELEBREX) 200 MG Cap Take 200 mg by mouth 2 times a day.     • gabapentin (NEURONTIN) 100 MG Cap TAKE 1 CAPSULE BY MOUTH ONCE DAILY 30 Cap 2   • alendronate (FOSAMAX) 70 MG Tab One tab by mouth in the morning on empty stomach one day week, take with 8 ounces of water, do not lie down for 30 minutes after taking medication 12 Tab 3   • alendronate (FOSAMAX) 70 MG Tab One tab by mouth in a.m. every 7 days on an empty stomach with 8 ounces of water, do not lie down for 30 minutes after taking medication 12 Tab 3   •  Probiotic Product (ALIGN) Cap Take 1 Cap by mouth every day.     • gabapentin (NEURONTIN) 100 MG Cap TAKE ONE CAPSULE BY MOUTH AT BEDTIME 90 Cap 0   • Estradiol (ESTRACE PO) Take 1 Tab by mouth every evening.     • thyroid (ARMOUR THYROID) 30 MG Tab Take 30 mg by mouth every day.     • duloxetine (CYMBALTA) 60 MG CPEP Take 60 mg by mouth every day.     • trazodone (DESYREL) 100 MG TABS Take 100 mg by mouth every evening.     • Cholecalciferol (VITAMIN D PO) Take 1 Tab by mouth every day.     • Multiple Vitamins-Minerals (EYE VITAMINS PO) Take 1 Tab by mouth every day.     • B Complex Vitamins (VITAMIN B COMPLEX PO) Take 1 Tab by mouth every day.     • LYSINE PO Take 1 Tab by mouth every day.     • Multiple Vitamins-Minerals (BONE SMART PO) Take 1 Tab by mouth every day.     • pentosan (ELMIRON) 100 MG CAPS Take 100 mg by mouth 3 times a day before meals.     • Estradiol (VAGIFEM VA) Insert  in vagina See Admin Instructions. 3 X weekly     • mupirocin (BACTROBAN) 2 % Ointment Apply 1 Application to affected area(s) 2 times a day. 1 Tube 0   • Mirabegron ER (MYRBETRIQ) 50 MG TABLET SR 24 HR Take 1 Tab by mouth every day.     • Hyaluronic Acid-Vitamin C (HYALURONIC ACID PO) Take 1 Tab by mouth every day.       No current facility-administered medications for this visit.      She  has a past medical history of Anesthesia; Arthritis (02/06/2018); Chronic diarrhea; Chronic UTI; Cold (01/2018); Depression; History of cataract surgery; Hypothyroid; Interstitial cystitis; Other specified disorder of intestines; Pain; Urinary bladder disorder; and Varicose veins.    ROS   Other than what is mentioned in HPI or physical exam, there is no history of headaches, double vision or blurred vision. No temporal tenderness or jaw claudication. No history of cataracts or glaucoma. No trouble swallowing difficulties or sore throats.  No chest complaints including chest pain, cough or sputum production. No GI complaints including nausea,  vomiting, change in bowel habits, or past peptic ulcer disease. No history of blood in the stools. No urinary complaints including dysuria or frequency. No history of alopecia, photosensitivity, oral ulcerations, Raynaud's phenomena.       Objective:     Blood pressure 140/70, pulse 84, temperature 36.9 °C (98.4 °F), resp. rate 14, weight 62.1 kg (137 lb), SpO2 96 %. Body mass index is 21.14 kg/m².   Physical Exam:    Constitutional: Alert and oriented X3, patient is talkative with good eye contact.Skin: Warm, dry, good turgor, no rashes in visible areas, no psoriatic lesions, no petechial lesions, no malar rashes  .Eye: Equal, round and reactive, conjunctiva clear, lids normal EOM intactENMT: Lips without lesions, good dentition, no oropharyngeal ulcers, moist buccal mucosa, pinna without deformity, temporal arteries are palpable and nontender to palpation, no temporal artery bruits Neck: Trachea midline, no masses, no thyromegaly, no carotid bruits, no subclavian bruits.Lymph:  No cervical lymphadenopathy, no axillary lymphadenopathy, no supraclavicular lymphadenopathyRespiratory: Unlabored respiratory effort, lungs clear to auscultation, no wheezes, no ronchi.Cardiovascular: Normal S1, S2, no murmur, no edema.Abdomen: Soft, non-tender, no masses, no hepatosplenomegaly.Psych: Alert and oriented x3, normal affect and mood.Neuro: Cranial nerves 2-12 are grossly intact, no loss of sensation LEExt:no joint laxity noted in bilateral arms, no joint laxity noted in bilateral legs, both shoulders full range of motion including internal and external rotation and abduction, elbows without flexion contractures and without active synovitis wrists good range of motion without limitations, the small joints of the hands do indicate some Heberden's nodes on the DIP joints but no evidence of swan-neck or boutonniere deformities, crepitus in the knees but no effusions, no lower extremity edema, no Achilles inflammation, toes  without splay toes and without crossover toes, patient does splinter back a little bit with walking but there is no antalgia and no foot drop    Lab Results   Component Value Date/Time    SODIUM 132 (L) 04/20/2018 02:50 PM    POTASSIUM 3.7 04/20/2018 02:50 PM    CHLORIDE 102 04/20/2018 02:50 PM    CO2 24 04/20/2018 02:50 PM    GLUCOSE 78 04/20/2018 02:50 PM    BUN 19 04/20/2018 02:50 PM    CREATININE 0.85 04/20/2018 02:50 PM    CREATININE 0.9 01/04/2005 12:05 PM      Lab Results   Component Value Date/Time    WBC 6.6 04/20/2018 02:50 PM    RBC 3.78 (L) 04/20/2018 02:50 PM    HEMOGLOBIN 11.8 (L) 04/20/2018 02:50 PM    HEMATOCRIT 35.1 (L) 04/20/2018 02:50 PM    MCV 92.9 04/20/2018 02:50 PM    MCH 31.2 04/20/2018 02:50 PM    MCHC 33.6 04/20/2018 02:50 PM    MPV 10.1 04/20/2018 02:50 PM    NEUTSPOLYS 61.20 04/20/2018 02:50 PM    LYMPHOCYTES 26.40 04/20/2018 02:50 PM    MONOCYTES 9.50 04/20/2018 02:50 PM    EOSINOPHILS 1.80 04/20/2018 02:50 PM    BASOPHILS 0.80 04/20/2018 02:50 PM      Lab Results   Component Value Date/Time    CALCIUM 9.3 04/20/2018 02:50 PM    ASTSGOT 17 04/20/2018 02:50 PM    ALTSGPT 14 04/20/2018 02:50 PM    ALKPHOSPHAT 69 04/20/2018 02:50 PM    TBILIRUBIN 0.3 04/20/2018 02:50 PM    ALBUMIN 3.9 04/20/2018 02:50 PM    ALBUMIN 4.29 03/05/2013 09:00 AM    TOTPROTEIN 7.0 04/20/2018 02:50 PM    TOTPROTEIN 7.60 03/05/2013 09:00 AM     Lab Results   Component Value Date/Time    URICACID 3.7 03/04/2017 09:45 AM    RHEUMFACTN 8 02/27/2013 10:56 AM    CCPANTIBODY 3 02/27/2015 08:44 AM    ANTINUCAB None Detected 02/27/2015 08:44 AM     Lab Results   Component Value Date/Time    SEDRATEWES 10 12/07/2017 11:02 AM     Lab Results   Component Value Date/Time    HBA1C 5.5 05/19/2014 09:25 AM     Lab Results   Component Value Date/Time    CPKTOTAL 84 11/30/2016 10:32 AM     Results for orders placed during the hospital encounter of 03/06/18   DS-BONE DENSITY STUDY (DEXA)    Impression According to the World Health  Organization classification, bone mineral density of this patient is osteopenic.        10-year Probability of Fracture:  Major Osteoporotic     26.1%  Hip     9.5%  Population      [place] ()    Based on left femur neck BMD          INTERPRETING LOCATION:  [address] [place] [address], [id number]     Results for orders placed during the hospital encounter of 06/09/10   DX-HAND 3+     Results for orders placed during the hospital encounter of 05/18/16   DX-WRIST-COMPLETE 3+ RIGHT    Impression Degenerative changes of the wrist and base of the thumb without definite acute osseous abnormality.     Results for orders placed during the hospital encounter of 07/05/18   MR-LUMBAR SPINE-W/O    Impression 1.  No significant change in foraminal greater than lateral recess stenoses, greatest stenoses are moderate foraminal at L5/S1 on the right, L1/2 on the left.  2.  3.  Severe facet arthropathy greater than degenerative disc disease    4.  Worsening discogenic edema at several levels, greatest involvement is still L1/2    5.  Stable several levels of degenerative listhesis     Results for orders placed during the hospital encounter of 03/07/18   MR-CERVICAL SPINE-W/O    Impression 1.  C3-C4 marked Modic type I discogenic endplate marrow edema. Associated Schmorl's node defect at the C3 inferior endplate. This can be a pain generator.  2.  Mild degenerative anterolisthesis C3 on C4 and retrolisthesis C5 on C6.  3.  C3-C4 mild central stenosis due to disc-osteophyte complex and ligamentum flavum buckling or hypertrophy. Mild right foraminal stenosis. Mild left-sided hypertrophic facet arthropathy. Moderate left foraminal stenosis.  4.  C4-5 mild left-sided hypertrophic facet arthropathy.  5.  C5-6 degenerative disc space narrowing. Small disc/osteophyte complex accentuated by retrolisthesis. Facet arthropathy. Borderline bilateral foraminal stenosis.  6.  C6-C7 minimal disc/osteophyte complex. Mild bilateral facet arthropathy.  Mild right foraminal stenosis due to uncinate spondylosis.  7.  No myelopathic cord signal abnormality at any cervical level.     Results for orders placed during the hospital encounter of 06/09/10   DX-CERVICAL SPINE-2 OR 3 VIEWS     Assessment and Plan:     1. hx of PMR (polymyalgia rheumatica) (HCC)  In remission we will check ESR the patient without any symptoms  - WESTERGREN SED RATE; Future    2. Senile osteoporosis  Last DEXA March 2018, next DEXA March 2020  Currently on Fosamax 70 mg p.o. weekly since March 2018  - WESTERGREN SED RATE; Future    3. Chronic bilateral low back pain with left-sided sciatica  Followed by spine clinic undergoing physical therapy status post epidurals    4. Fibromyalgia  Currently on gabapentin and Celebrex as well as Cymbalta    5. Hypothyroidism, unspecified type  Can exacerbate joint pain, I recommend monitoring thyroid on a regular basis to assure it is not contributing to the patient's joint pain    Followup: Return in about 1 year (around 9/10/2019). or sooner nupur Menendez was seen 30 minutes face-to-face of which more than 50% of the time was spent counseling the patient (excluding time for procedures)  regarding  rheumatological condition and care. Therapy was discussed in detail.    Please note that this dictation was created using voice recognition software. I have made every reasonable attempt to correct obvious errors, but I expect that there are errors of grammar and possibly content that I did not discover before finalizing the note.

## 2018-09-12 ENCOUNTER — HOSPITAL ENCOUNTER (OUTPATIENT)
Dept: LAB | Facility: MEDICAL CENTER | Age: 83
End: 2018-09-12
Attending: INTERNAL MEDICINE
Payer: MEDICARE

## 2018-09-12 DIAGNOSIS — M81.0 SENILE OSTEOPOROSIS: ICD-10-CM

## 2018-09-12 DIAGNOSIS — M35.3 PMR (POLYMYALGIA RHEUMATICA) (HCC): ICD-10-CM

## 2018-09-12 LAB — ERYTHROCYTE [SEDIMENTATION RATE] IN BLOOD BY WESTERGREN METHOD: 3 MM/HOUR (ref 0–30)

## 2018-09-12 PROCEDURE — 36415 COLL VENOUS BLD VENIPUNCTURE: CPT

## 2018-09-12 PROCEDURE — 85652 RBC SED RATE AUTOMATED: CPT

## 2018-09-20 ENCOUNTER — APPOINTMENT (OUTPATIENT)
Dept: OTHER | Facility: IMAGING CENTER | Age: 83
End: 2018-09-20

## 2018-10-31 ENCOUNTER — HOSPITAL ENCOUNTER (OUTPATIENT)
Dept: LAB | Facility: MEDICAL CENTER | Age: 83
End: 2018-10-31
Attending: NURSE PRACTITIONER
Payer: MEDICARE

## 2018-10-31 LAB
BUN SERPL-MCNC: 21 MG/DL (ref 8–22)
CREAT SERPL-MCNC: 0.84 MG/DL (ref 0.5–1.4)

## 2018-10-31 PROCEDURE — 82565 ASSAY OF CREATININE: CPT

## 2018-10-31 PROCEDURE — 84520 ASSAY OF UREA NITROGEN: CPT

## 2018-10-31 PROCEDURE — 36415 COLL VENOUS BLD VENIPUNCTURE: CPT

## 2018-11-28 DIAGNOSIS — Z01.812 PRE-OPERATIVE LABORATORY EXAMINATION: ICD-10-CM

## 2018-11-28 DIAGNOSIS — Z01.810 PRE-OPERATIVE CARDIOVASCULAR EXAMINATION: ICD-10-CM

## 2018-11-28 LAB
ABO GROUP BLD: NORMAL
ABO GROUP BLD: NORMAL
ANION GAP SERPL CALC-SCNC: 6 MMOL/L (ref 0–11.9)
APPEARANCE UR: CLEAR
APTT PPP: 28 SEC (ref 24.7–36)
BACTERIA #/AREA URNS HPF: ABNORMAL /HPF
BASOPHILS # BLD AUTO: 1 % (ref 0–1.8)
BASOPHILS # BLD: 0.05 K/UL (ref 0–0.12)
BILIRUB UR QL STRIP.AUTO: NEGATIVE
BLD GP AB SCN SERPL QL: NORMAL
BUN SERPL-MCNC: 18 MG/DL (ref 8–22)
CALCIUM SERPL-MCNC: 9.4 MG/DL (ref 8.5–10.5)
CHLORIDE SERPL-SCNC: 100 MMOL/L (ref 96–112)
CO2 SERPL-SCNC: 27 MMOL/L (ref 20–33)
COLOR UR: YELLOW
CREAT SERPL-MCNC: 0.86 MG/DL (ref 0.5–1.4)
EKG IMPRESSION: NORMAL
EOSINOPHIL # BLD AUTO: 0.11 K/UL (ref 0–0.51)
EOSINOPHIL NFR BLD: 2.3 % (ref 0–6.9)
EPI CELLS #/AREA URNS HPF: NEGATIVE /HPF
ERYTHROCYTE [DISTWIDTH] IN BLOOD BY AUTOMATED COUNT: 48.3 FL (ref 35.9–50)
GLUCOSE SERPL-MCNC: 82 MG/DL (ref 65–99)
GLUCOSE UR STRIP.AUTO-MCNC: NEGATIVE MG/DL
HCT VFR BLD AUTO: 38.8 % (ref 37–47)
HGB BLD-MCNC: 12.6 G/DL (ref 12–16)
HYALINE CASTS #/AREA URNS LPF: ABNORMAL /LPF
IMM GRANULOCYTES # BLD AUTO: 0.01 K/UL (ref 0–0.11)
IMM GRANULOCYTES NFR BLD AUTO: 0.2 % (ref 0–0.9)
INR PPP: 0.94 (ref 0.87–1.13)
KETONES UR STRIP.AUTO-MCNC: NEGATIVE MG/DL
LEUKOCYTE ESTERASE UR QL STRIP.AUTO: ABNORMAL
LYMPHOCYTES # BLD AUTO: 1.78 K/UL (ref 1–4.8)
LYMPHOCYTES NFR BLD: 37.2 % (ref 22–41)
MCH RBC QN AUTO: 30.9 PG (ref 27–33)
MCHC RBC AUTO-ENTMCNC: 32.5 G/DL (ref 33.6–35)
MCV RBC AUTO: 95.1 FL (ref 81.4–97.8)
MICRO URNS: ABNORMAL
MONOCYTES # BLD AUTO: 0.4 K/UL (ref 0–0.85)
MONOCYTES NFR BLD AUTO: 8.4 % (ref 0–13.4)
NEUTROPHILS # BLD AUTO: 2.44 K/UL (ref 2–7.15)
NEUTROPHILS NFR BLD: 50.9 % (ref 44–72)
NITRITE UR QL STRIP.AUTO: POSITIVE
NRBC # BLD AUTO: 0 K/UL
NRBC BLD-RTO: 0 /100 WBC
PH UR STRIP.AUTO: 6 [PH]
PLATELET # BLD AUTO: 270 K/UL (ref 164–446)
PMV BLD AUTO: 9.7 FL (ref 9–12.9)
POTASSIUM SERPL-SCNC: 4 MMOL/L (ref 3.6–5.5)
PROT UR QL STRIP: NEGATIVE MG/DL
PROTHROMBIN TIME: 12.7 SEC (ref 12–14.6)
RBC # BLD AUTO: 4.08 M/UL (ref 4.2–5.4)
RBC # URNS HPF: ABNORMAL /HPF
RBC UR QL AUTO: ABNORMAL
RH BLD: NORMAL
RH BLD: NORMAL
SODIUM SERPL-SCNC: 133 MMOL/L (ref 135–145)
SP GR UR STRIP.AUTO: 1.01
UROBILINOGEN UR STRIP.AUTO-MCNC: 0.2 MG/DL
WBC # BLD AUTO: 4.8 K/UL (ref 4.8–10.8)
WBC #/AREA URNS HPF: ABNORMAL /HPF

## 2018-11-28 PROCEDURE — 86850 RBC ANTIBODY SCREEN: CPT

## 2018-11-28 PROCEDURE — 87186 SC STD MICRODIL/AGAR DIL: CPT

## 2018-11-28 PROCEDURE — 86901 BLOOD TYPING SEROLOGIC RH(D): CPT

## 2018-11-28 PROCEDURE — 85025 COMPLETE CBC W/AUTO DIFF WBC: CPT

## 2018-11-28 PROCEDURE — 87086 URINE CULTURE/COLONY COUNT: CPT

## 2018-11-28 PROCEDURE — 86900 BLOOD TYPING SEROLOGIC ABO: CPT

## 2018-11-28 PROCEDURE — 81001 URINALYSIS AUTO W/SCOPE: CPT

## 2018-11-28 PROCEDURE — 87077 CULTURE AEROBIC IDENTIFY: CPT

## 2018-11-28 PROCEDURE — 93010 ELECTROCARDIOGRAM REPORT: CPT | Performed by: INTERNAL MEDICINE

## 2018-11-28 PROCEDURE — 93005 ELECTROCARDIOGRAM TRACING: CPT

## 2018-11-28 PROCEDURE — 36415 COLL VENOUS BLD VENIPUNCTURE: CPT

## 2018-11-28 PROCEDURE — 85730 THROMBOPLASTIN TIME PARTIAL: CPT

## 2018-11-28 PROCEDURE — 80048 BASIC METABOLIC PNL TOTAL CA: CPT

## 2018-11-28 PROCEDURE — 85610 PROTHROMBIN TIME: CPT

## 2018-11-28 RX ORDER — METHENAMINE HIPPURATE 1000 MG/1
1 TABLET ORAL DAILY
COMMUNITY
End: 2021-06-10

## 2018-11-28 RX ORDER — LIOTHYRONINE SODIUM 5 UG/1
5 TABLET ORAL DAILY
COMMUNITY
End: 2021-06-10

## 2018-11-28 RX ORDER — ACETAMINOPHEN 500 MG
1500 TABLET ORAL EVERY 6 HOURS PRN
Status: ON HOLD | COMMUNITY
End: 2023-08-07

## 2018-11-28 RX ORDER — GABAPENTIN 300 MG/1
300 CAPSULE ORAL 3 TIMES DAILY
COMMUNITY
End: 2021-06-10

## 2018-11-28 RX ORDER — MULTIVIT-MIN/IRON/FOLIC ACID/K 18-600-40
2000 CAPSULE ORAL DAILY
COMMUNITY

## 2018-11-30 ENCOUNTER — APPOINTMENT (OUTPATIENT)
Dept: ADMISSIONS | Facility: MEDICAL CENTER | Age: 83
DRG: 460 | End: 2018-11-30
Payer: MEDICARE

## 2018-11-30 LAB
BACTERIA UR CULT: ABNORMAL
BACTERIA UR CULT: ABNORMAL
SIGNIFICANT IND 70042: ABNORMAL
SITE SITE: ABNORMAL
SOURCE SOURCE: ABNORMAL

## 2018-12-03 ENCOUNTER — HOSPITAL ENCOUNTER (INPATIENT)
Facility: MEDICAL CENTER | Age: 83
LOS: 4 days | DRG: 460 | End: 2018-12-07
Attending: NEUROLOGICAL SURGERY | Admitting: NEUROLOGICAL SURGERY
Payer: MEDICARE

## 2018-12-03 ENCOUNTER — APPOINTMENT (OUTPATIENT)
Dept: RADIOLOGY | Facility: MEDICAL CENTER | Age: 83
DRG: 460 | End: 2018-12-03
Attending: NEUROLOGICAL SURGERY
Payer: MEDICARE

## 2018-12-03 DIAGNOSIS — M48.062 LUMBAR STENOSIS WITH NEUROGENIC CLAUDICATION: ICD-10-CM

## 2018-12-03 PROCEDURE — C1713 ANCHOR/SCREW BN/BN,TIS/BN: HCPCS | Performed by: NEUROLOGICAL SURGERY

## 2018-12-03 PROCEDURE — 700111 HCHG RX REV CODE 636 W/ 250 OVERRIDE (IP)

## 2018-12-03 PROCEDURE — 502240 HCHG MISC OR SUPPLY RC 0272: Performed by: NEUROLOGICAL SURGERY

## 2018-12-03 PROCEDURE — 700102 HCHG RX REV CODE 250 W/ 637 OVERRIDE(OP): Performed by: ANESTHESIOLOGY

## 2018-12-03 PROCEDURE — 160035 HCHG PACU - 1ST 60 MINS PHASE I: Performed by: NEUROLOGICAL SURGERY

## 2018-12-03 PROCEDURE — C1821 INTERSPINOUS IMPLANT: HCPCS | Performed by: NEUROLOGICAL SURGERY

## 2018-12-03 PROCEDURE — 500885 HCHG PACK, JACKSON TABLE: Performed by: NEUROLOGICAL SURGERY

## 2018-12-03 PROCEDURE — 700111 HCHG RX REV CODE 636 W/ 250 OVERRIDE (IP): Performed by: ANESTHESIOLOGY

## 2018-12-03 PROCEDURE — A4314 CATH W/DRAINAGE 2-WAY LATEX: HCPCS | Performed by: NEUROLOGICAL SURGERY

## 2018-12-03 PROCEDURE — A9270 NON-COVERED ITEM OR SERVICE: HCPCS | Performed by: ANESTHESIOLOGY

## 2018-12-03 PROCEDURE — 0SG00AJ FUSION OF LUMBAR VERTEBRAL JOINT WITH INTERBODY FUSION DEVICE, POSTERIOR APPROACH, ANTERIOR COLUMN, OPEN APPROACH: ICD-10-PCS | Performed by: NEUROLOGICAL SURGERY

## 2018-12-03 PROCEDURE — 160002 HCHG RECOVERY MINUTES (STAT): Performed by: NEUROLOGICAL SURGERY

## 2018-12-03 PROCEDURE — A9270 NON-COVERED ITEM OR SERVICE: HCPCS | Performed by: NURSE PRACTITIONER

## 2018-12-03 PROCEDURE — 700102 HCHG RX REV CODE 250 W/ 637 OVERRIDE(OP): Performed by: NURSE PRACTITIONER

## 2018-12-03 PROCEDURE — 700101 HCHG RX REV CODE 250

## 2018-12-03 PROCEDURE — 160048 HCHG OR STATISTICAL LEVEL 1-5: Performed by: NEUROLOGICAL SURGERY

## 2018-12-03 PROCEDURE — 500367 HCHG DRAIN KIT, HEMOVAC: Performed by: NEUROLOGICAL SURGERY

## 2018-12-03 PROCEDURE — A6402 STERILE GAUZE <= 16 SQ IN: HCPCS | Performed by: NEUROLOGICAL SURGERY

## 2018-12-03 PROCEDURE — 72100 X-RAY EXAM L-S SPINE 2/3 VWS: CPT

## 2018-12-03 PROCEDURE — 160042 HCHG SURGERY MINUTES - EA ADDL 1 MIN LEVEL 5: Performed by: NEUROLOGICAL SURGERY

## 2018-12-03 PROCEDURE — 110371 HCHG SHELL REV 272: Performed by: NEUROLOGICAL SURGERY

## 2018-12-03 PROCEDURE — 700112 HCHG RX REV CODE 229: Performed by: NURSE PRACTITIONER

## 2018-12-03 PROCEDURE — 501838 HCHG SUTURE GENERAL: Performed by: NEUROLOGICAL SURGERY

## 2018-12-03 PROCEDURE — 160031 HCHG SURGERY MINUTES - 1ST 30 MINS LEVEL 5: Performed by: NEUROLOGICAL SURGERY

## 2018-12-03 PROCEDURE — 500002 HCHG ADHESIVE, DERMABOND: Performed by: NEUROLOGICAL SURGERY

## 2018-12-03 PROCEDURE — 160009 HCHG ANES TIME/MIN: Performed by: NEUROLOGICAL SURGERY

## 2018-12-03 PROCEDURE — 700101 HCHG RX REV CODE 250: Performed by: NURSE PRACTITIONER

## 2018-12-03 PROCEDURE — 0SB20ZZ EXCISION OF LUMBAR VERTEBRAL DISC, OPEN APPROACH: ICD-10-PCS | Performed by: NEUROLOGICAL SURGERY

## 2018-12-03 PROCEDURE — 110454 HCHG SHELL REV 250: Performed by: NEUROLOGICAL SURGERY

## 2018-12-03 PROCEDURE — 770001 HCHG ROOM/CARE - MED/SURG/GYN PRIV*

## 2018-12-03 PROCEDURE — 160036 HCHG PACU - EA ADDL 30 MINS PHASE I: Performed by: NEUROLOGICAL SURGERY

## 2018-12-03 DEVICE — SCREW SOLERA SET SCREW (1TCX40+3TCX21+2TCX10=123): Type: IMPLANTABLE DEVICE | Site: BACK | Status: FUNCTIONAL

## 2018-12-03 DEVICE — SCREW MAS  SOLERA 6.5 X 55MM (1TCX8+3TCX8=32): Type: IMPLANTABLE DEVICE | Site: BACK | Status: FUNCTIONAL

## 2018-12-03 DEVICE — SPACER RISE 10X22MM 7-13MM (3TCONX2=6): Type: IMPLANTABLE DEVICE | Site: BACK | Status: FUNCTIONAL

## 2018-12-03 DEVICE — MAGNIFUSE 1X5CM: Type: IMPLANTABLE DEVICE | Site: BACK | Status: FUNCTIONAL

## 2018-12-03 DEVICE — ROD PREBENT TITANIUM 5.5 X 35MM (2TCONX2=4): Type: IMPLANTABLE DEVICE | Site: BACK | Status: FUNCTIONAL

## 2018-12-03 RX ORDER — AMOXICILLIN 250 MG
1 CAPSULE ORAL NIGHTLY
Status: DISCONTINUED | OUTPATIENT
Start: 2018-12-03 | End: 2018-12-07 | Stop reason: HOSPADM

## 2018-12-03 RX ORDER — SODIUM CHLORIDE, SODIUM LACTATE, POTASSIUM CHLORIDE, CALCIUM CHLORIDE 600; 310; 30; 20 MG/100ML; MG/100ML; MG/100ML; MG/100ML
INJECTION, SOLUTION INTRAVENOUS ONCE
Status: COMPLETED | OUTPATIENT
Start: 2018-12-03 | End: 2018-12-03

## 2018-12-03 RX ORDER — DULOXETIN HYDROCHLORIDE 60 MG/1
60 CAPSULE, DELAYED RELEASE ORAL DAILY
Status: DISCONTINUED | OUTPATIENT
Start: 2018-12-04 | End: 2018-12-07 | Stop reason: HOSPADM

## 2018-12-03 RX ORDER — OXYCODONE HYDROCHLORIDE 5 MG/1
2.5 TABLET ORAL
Status: DISCONTINUED | OUTPATIENT
Start: 2018-12-03 | End: 2018-12-04

## 2018-12-03 RX ORDER — MORPHINE SULFATE 10 MG/ML
2 INJECTION, SOLUTION INTRAMUSCULAR; INTRAVENOUS
Status: DISCONTINUED | OUTPATIENT
Start: 2018-12-03 | End: 2018-12-07 | Stop reason: HOSPADM

## 2018-12-03 RX ORDER — HYDRALAZINE HYDROCHLORIDE 20 MG/ML
10 INJECTION INTRAMUSCULAR; INTRAVENOUS
Status: DISCONTINUED | OUTPATIENT
Start: 2018-12-03 | End: 2018-12-07 | Stop reason: HOSPADM

## 2018-12-03 RX ORDER — DOCUSATE SODIUM 100 MG/1
100 CAPSULE, LIQUID FILLED ORAL 2 TIMES DAILY
Status: DISCONTINUED | OUTPATIENT
Start: 2018-12-03 | End: 2018-12-07 | Stop reason: HOSPADM

## 2018-12-03 RX ORDER — VANCOMYCIN HCL 900 MCG/MG
POWDER (GRAM) MISCELLANEOUS
Status: DISCONTINUED | OUTPATIENT
Start: 2018-12-03 | End: 2018-12-03 | Stop reason: HOSPADM

## 2018-12-03 RX ORDER — ALENDRONATE SODIUM 70 MG/1
70 TABLET ORAL
COMMUNITY
End: 2019-08-14 | Stop reason: SDUPTHER

## 2018-12-03 RX ORDER — OXYCODONE HYDROCHLORIDE 5 MG/1
5 TABLET ORAL
Status: DISCONTINUED | OUTPATIENT
Start: 2018-12-03 | End: 2018-12-03 | Stop reason: HOSPADM

## 2018-12-03 RX ORDER — HALOPERIDOL 5 MG/ML
1 INJECTION INTRAMUSCULAR
Status: DISCONTINUED | OUTPATIENT
Start: 2018-12-03 | End: 2018-12-03 | Stop reason: HOSPADM

## 2018-12-03 RX ORDER — OXYCODONE HCL 5 MG/5 ML
10 SOLUTION, ORAL ORAL
Status: COMPLETED | OUTPATIENT
Start: 2018-12-03 | End: 2018-12-03

## 2018-12-03 RX ORDER — CIPROFLOXACIN 500 MG/1
500 TABLET, FILM COATED ORAL 2 TIMES DAILY
Status: DISCONTINUED | OUTPATIENT
Start: 2018-12-03 | End: 2018-12-03

## 2018-12-03 RX ORDER — HYDROMORPHONE HYDROCHLORIDE 1 MG/ML
0.2 INJECTION, SOLUTION INTRAMUSCULAR; INTRAVENOUS; SUBCUTANEOUS
Status: DISCONTINUED | OUTPATIENT
Start: 2018-12-03 | End: 2018-12-03 | Stop reason: HOSPADM

## 2018-12-03 RX ORDER — BACITRACIN 50000 [IU]/1
INJECTION, POWDER, FOR SOLUTION INTRAMUSCULAR
Status: DISCONTINUED | OUTPATIENT
Start: 2018-12-03 | End: 2018-12-03 | Stop reason: HOSPADM

## 2018-12-03 RX ORDER — ACETAMINOPHEN 325 MG/1
650 TABLET ORAL EVERY 6 HOURS PRN
Status: DISCONTINUED | OUTPATIENT
Start: 2018-12-03 | End: 2018-12-07 | Stop reason: HOSPADM

## 2018-12-03 RX ORDER — CLONIDINE HYDROCHLORIDE 0.1 MG/1
0.1 TABLET ORAL EVERY 4 HOURS PRN
Status: DISCONTINUED | OUTPATIENT
Start: 2018-12-03 | End: 2018-12-07 | Stop reason: HOSPADM

## 2018-12-03 RX ORDER — ONDANSETRON 4 MG/1
4 TABLET, ORALLY DISINTEGRATING ORAL EVERY 4 HOURS PRN
Status: DISCONTINUED | OUTPATIENT
Start: 2018-12-03 | End: 2018-12-07 | Stop reason: HOSPADM

## 2018-12-03 RX ORDER — ACETAMINOPHEN 500 MG
1000 TABLET ORAL ONCE
Status: COMPLETED | OUTPATIENT
Start: 2018-12-03 | End: 2018-12-03

## 2018-12-03 RX ORDER — SODIUM CHLORIDE, SODIUM LACTATE, POTASSIUM CHLORIDE, AND CALCIUM CHLORIDE .6; .31; .03; .02 G/100ML; G/100ML; G/100ML; G/100ML
500 INJECTION, SOLUTION INTRAVENOUS ONCE
Status: DISCONTINUED | OUTPATIENT
Start: 2018-12-03 | End: 2018-12-03 | Stop reason: HOSPADM

## 2018-12-03 RX ORDER — AMOXICILLIN 250 MG
1 CAPSULE ORAL
Status: DISCONTINUED | OUTPATIENT
Start: 2018-12-03 | End: 2018-12-07 | Stop reason: HOSPADM

## 2018-12-03 RX ORDER — ONDANSETRON 2 MG/ML
4 INJECTION INTRAMUSCULAR; INTRAVENOUS
Status: DISCONTINUED | OUTPATIENT
Start: 2018-12-03 | End: 2018-12-03 | Stop reason: HOSPADM

## 2018-12-03 RX ORDER — BISACODYL 10 MG
10 SUPPOSITORY, RECTAL RECTAL
Status: DISCONTINUED | OUTPATIENT
Start: 2018-12-03 | End: 2018-12-07 | Stop reason: HOSPADM

## 2018-12-03 RX ORDER — TIZANIDINE 4 MG/1
2 TABLET ORAL 3 TIMES DAILY PRN
Status: DISCONTINUED | OUTPATIENT
Start: 2018-12-03 | End: 2018-12-04

## 2018-12-03 RX ORDER — CIPROFLOXACIN 500 MG/1
500 TABLET, FILM COATED ORAL 2 TIMES DAILY
Status: ON HOLD | COMMUNITY
Start: 2018-11-30 | End: 2018-12-06

## 2018-12-03 RX ORDER — TRAZODONE HYDROCHLORIDE 100 MG/1
50 TABLET ORAL NIGHTLY
Status: DISCONTINUED | OUTPATIENT
Start: 2018-12-03 | End: 2018-12-07 | Stop reason: HOSPADM

## 2018-12-03 RX ORDER — LIDOCAINE HYDROCHLORIDE 10 MG/ML
INJECTION, SOLUTION EPIDURAL; INFILTRATION; INTRACAUDAL; PERINEURAL
Status: COMPLETED
Start: 2018-12-03 | End: 2018-12-03

## 2018-12-03 RX ORDER — ALENDRONATE SODIUM 70 MG/1
70 TABLET ORAL
Status: DISCONTINUED | OUTPATIENT
Start: 2018-12-05 | End: 2018-12-07 | Stop reason: HOSPADM

## 2018-12-03 RX ORDER — HYDROMORPHONE HYDROCHLORIDE 1 MG/ML
0.1 INJECTION, SOLUTION INTRAMUSCULAR; INTRAVENOUS; SUBCUTANEOUS
Status: DISCONTINUED | OUTPATIENT
Start: 2018-12-03 | End: 2018-12-03 | Stop reason: HOSPADM

## 2018-12-03 RX ORDER — ESTRADIOL 0.1 MG/G
1 CREAM VAGINAL EVERY EVENING
COMMUNITY
End: 2022-11-11

## 2018-12-03 RX ORDER — SODIUM CHLORIDE AND POTASSIUM CHLORIDE 150; 900 MG/100ML; MG/100ML
INJECTION, SOLUTION INTRAVENOUS CONTINUOUS
Status: DISCONTINUED | OUTPATIENT
Start: 2018-12-03 | End: 2018-12-07 | Stop reason: HOSPADM

## 2018-12-03 RX ORDER — METHENAMINE HIPPURATE 1000 MG/1
1 TABLET ORAL DAILY
Status: DISCONTINUED | OUTPATIENT
Start: 2018-12-04 | End: 2018-12-07 | Stop reason: HOSPADM

## 2018-12-03 RX ORDER — BUPIVACAINE HYDROCHLORIDE AND EPINEPHRINE 5; 5 MG/ML; UG/ML
INJECTION, SOLUTION EPIDURAL; INTRACAUDAL; PERINEURAL
Status: DISCONTINUED | OUTPATIENT
Start: 2018-12-03 | End: 2018-12-03 | Stop reason: HOSPADM

## 2018-12-03 RX ORDER — LIOTHYRONINE SODIUM 5 UG/1
5 TABLET ORAL DAILY
Status: DISCONTINUED | OUTPATIENT
Start: 2018-12-04 | End: 2018-12-07 | Stop reason: HOSPADM

## 2018-12-03 RX ORDER — THYROID 60 MG/1
150 TABLET ORAL DAILY
COMMUNITY
End: 2022-01-20

## 2018-12-03 RX ORDER — ONDANSETRON 2 MG/ML
4 INJECTION INTRAMUSCULAR; INTRAVENOUS EVERY 4 HOURS PRN
Status: DISCONTINUED | OUTPATIENT
Start: 2018-12-03 | End: 2018-12-07 | Stop reason: HOSPADM

## 2018-12-03 RX ORDER — OXYCODONE HYDROCHLORIDE 5 MG/1
10 TABLET ORAL
Status: DISCONTINUED | OUTPATIENT
Start: 2018-12-03 | End: 2018-12-03 | Stop reason: HOSPADM

## 2018-12-03 RX ORDER — ENEMA 19; 7 G/133ML; G/133ML
1 ENEMA RECTAL
Status: COMPLETED | OUTPATIENT
Start: 2018-12-03 | End: 2018-12-07

## 2018-12-03 RX ORDER — GABAPENTIN 300 MG/1
300 CAPSULE ORAL 3 TIMES DAILY
Status: DISCONTINUED | OUTPATIENT
Start: 2018-12-03 | End: 2018-12-07 | Stop reason: HOSPADM

## 2018-12-03 RX ORDER — THYROID 30 MG/1
60 TABLET ORAL DAILY
Status: DISCONTINUED | OUTPATIENT
Start: 2018-12-04 | End: 2018-12-07 | Stop reason: HOSPADM

## 2018-12-03 RX ORDER — OXYCODONE HYDROCHLORIDE 5 MG/1
5 TABLET ORAL
Status: DISCONTINUED | OUTPATIENT
Start: 2018-12-03 | End: 2018-12-04

## 2018-12-03 RX ORDER — POLYETHYLENE GLYCOL 3350 17 G/17G
1 POWDER, FOR SOLUTION ORAL 2 TIMES DAILY PRN
Status: DISCONTINUED | OUTPATIENT
Start: 2018-12-03 | End: 2018-12-07 | Stop reason: HOSPADM

## 2018-12-03 RX ORDER — ESTRADIOL 0.1 MG/G
1 CREAM VAGINAL EVERY EVENING
Status: DISCONTINUED | OUTPATIENT
Start: 2018-12-03 | End: 2018-12-03

## 2018-12-03 RX ORDER — METHYLPREDNISOLONE SODIUM SUCCINATE 125 MG/2ML
INJECTION, POWDER, LYOPHILIZED, FOR SOLUTION INTRAMUSCULAR; INTRAVENOUS
Status: DISPENSED
Start: 2018-12-03 | End: 2018-12-03

## 2018-12-03 RX ORDER — OXYCODONE HCL 5 MG/5 ML
5 SOLUTION, ORAL ORAL
Status: COMPLETED | OUTPATIENT
Start: 2018-12-03 | End: 2018-12-03

## 2018-12-03 RX ORDER — HYDROMORPHONE HYDROCHLORIDE 1 MG/ML
0.4 INJECTION, SOLUTION INTRAMUSCULAR; INTRAVENOUS; SUBCUTANEOUS
Status: DISCONTINUED | OUTPATIENT
Start: 2018-12-03 | End: 2018-12-03 | Stop reason: HOSPADM

## 2018-12-03 RX ADMIN — GABAPENTIN 300 MG: 300 CAPSULE ORAL at 20:21

## 2018-12-03 RX ADMIN — POTASSIUM CHLORIDE AND SODIUM CHLORIDE: 900; 150 INJECTION, SOLUTION INTRAVENOUS at 20:23

## 2018-12-03 RX ADMIN — OXYCODONE HYDROCHLORIDE 5 MG: 5 SOLUTION ORAL at 17:43

## 2018-12-03 RX ADMIN — TRAZODONE HYDROCHLORIDE 50 MG: 100 TABLET ORAL at 22:20

## 2018-12-03 RX ADMIN — OXYCODONE HYDROCHLORIDE 5 MG: 5 TABLET ORAL at 20:21

## 2018-12-03 RX ADMIN — ACETAMINOPHEN 500 MG: 500 TABLET, FILM COATED ORAL at 08:46

## 2018-12-03 RX ADMIN — DOCUSATE SODIUM 100 MG: 100 CAPSULE, LIQUID FILLED ORAL at 20:21

## 2018-12-03 RX ADMIN — SODIUM CHLORIDE, SODIUM LACTATE, POTASSIUM CHLORIDE, CALCIUM CHLORIDE: 600; 310; 30; 20 INJECTION, SOLUTION INTRAVENOUS at 08:45

## 2018-12-03 RX ADMIN — STANDARDIZED SENNA CONCENTRATE AND DOCUSATE SODIUM 1 TABLET: 8.6; 5 TABLET, FILM COATED ORAL at 20:21

## 2018-12-03 RX ADMIN — Medication 0.5 ML: at 08:45

## 2018-12-03 RX ADMIN — FENTANYL CITRATE 50 MCG: 50 INJECTION, SOLUTION INTRAMUSCULAR; INTRAVENOUS at 18:25

## 2018-12-03 ASSESSMENT — PAIN SCALES - GENERAL
PAINLEVEL_OUTOF10: 5
PAINLEVEL_OUTOF10: 2
PAINLEVEL_OUTOF10: 4
PAINLEVEL_OUTOF10: 5
PAINLEVEL_OUTOF10: 4

## 2018-12-03 ASSESSMENT — PATIENT HEALTH QUESTIONNAIRE - PHQ9
1. LITTLE INTEREST OR PLEASURE IN DOING THINGS: NOT AT ALL
2. FEELING DOWN, DEPRESSED, IRRITABLE, OR HOPELESS: NOT AT ALL
SUM OF ALL RESPONSES TO PHQ9 QUESTIONS 1 AND 2: 0

## 2018-12-03 NOTE — OR NURSING
Pt. Transported from OR to PACU with her eyeglasses, hearing aid, and gold necklace along with pts chart at this time.

## 2018-12-03 NOTE — OR SURGEON
Immediate Post OP Note    PreOp Diagnosis: lumbar stenosis/ spondylolisthesis     PostOp Diagnosis: lumbar stenosis/ spondylolisthesis     Procedure(s):  L4-5 TLIF - Wound Class: Clean    Surgeon(s):  Giancarlo Berman M.D.    Anesthesiologist/Type of Anesthesia:  Anesthesiologist: Reba Slater M.D./General    Surgical Staff:  Assistant: ARAMNDO Mcclain  Circulator: Sha Sales R.N.  Relief Circulator: Dominique Marino R.N.  Relief Scrub: Anna Mcghee  Scrub Person: Juancarlos Joseph    Specimens removed if any:  * No specimens in log *    Estimated Blood Loss: 150 cc    Findings: good decompression, good hardware placement     Complications: none         12/3/2018 2:06 PM ARMANDO Mcclain

## 2018-12-03 NOTE — OR NURSING
"Pre-op assessment completed. POC discussed with pt and SO. Orders to give 1000mg Tylenol PO. Pt only able to swallow 500mg and refusing second 500mg tablet stating \"it is stuck in my throat, I have esophageal stricture, I can't take the second one\". Dr. Slater notified. Call light in reach; hourly rounding in place.  "

## 2018-12-03 NOTE — PROGRESS NOTES
Med rec updated and complete  Allergies reviewed  Interviewed pt with  at bedside with permission from pt.

## 2018-12-04 LAB
ANION GAP SERPL CALC-SCNC: 5 MMOL/L (ref 0–11.9)
BUN SERPL-MCNC: 14 MG/DL (ref 8–22)
CALCIUM SERPL-MCNC: 8.3 MG/DL (ref 8.5–10.5)
CHLORIDE SERPL-SCNC: 99 MMOL/L (ref 96–112)
CO2 SERPL-SCNC: 27 MMOL/L (ref 20–33)
CREAT SERPL-MCNC: 0.63 MG/DL (ref 0.5–1.4)
ERYTHROCYTE [DISTWIDTH] IN BLOOD BY AUTOMATED COUNT: 48.5 FL (ref 35.9–50)
GLUCOSE SERPL-MCNC: 107 MG/DL (ref 65–99)
HCT VFR BLD AUTO: 29.5 % (ref 37–47)
HGB BLD-MCNC: 9.5 G/DL (ref 12–16)
MCH RBC QN AUTO: 31.6 PG (ref 27–33)
MCHC RBC AUTO-ENTMCNC: 32.6 G/DL (ref 33.6–35)
MCV RBC AUTO: 96.7 FL (ref 81.4–97.8)
PLATELET # BLD AUTO: 188 K/UL (ref 164–446)
PMV BLD AUTO: 9.6 FL (ref 9–12.9)
POTASSIUM SERPL-SCNC: 3.9 MMOL/L (ref 3.6–5.5)
RBC # BLD AUTO: 3.01 M/UL (ref 4.2–5.4)
SODIUM SERPL-SCNC: 131 MMOL/L (ref 135–145)
WBC # BLD AUTO: 6.8 K/UL (ref 4.8–10.8)

## 2018-12-04 PROCEDURE — 700101 HCHG RX REV CODE 250: Performed by: NURSE PRACTITIONER

## 2018-12-04 PROCEDURE — G8987 SELF CARE CURRENT STATUS: HCPCS | Mod: CJ

## 2018-12-04 PROCEDURE — 770001 HCHG ROOM/CARE - MED/SURG/GYN PRIV*

## 2018-12-04 PROCEDURE — 700105 HCHG RX REV CODE 258: Performed by: NURSE PRACTITIONER

## 2018-12-04 PROCEDURE — A9270 NON-COVERED ITEM OR SERVICE: HCPCS | Performed by: NURSE PRACTITIONER

## 2018-12-04 PROCEDURE — G8978 MOBILITY CURRENT STATUS: HCPCS | Mod: CJ

## 2018-12-04 PROCEDURE — 97535 SELF CARE MNGMENT TRAINING: CPT

## 2018-12-04 PROCEDURE — G8988 SELF CARE GOAL STATUS: HCPCS | Mod: CI

## 2018-12-04 PROCEDURE — G8979 MOBILITY GOAL STATUS: HCPCS | Mod: CI

## 2018-12-04 PROCEDURE — 80048 BASIC METABOLIC PNL TOTAL CA: CPT

## 2018-12-04 PROCEDURE — 97161 PT EVAL LOW COMPLEX 20 MIN: CPT

## 2018-12-04 PROCEDURE — 97165 OT EVAL LOW COMPLEX 30 MIN: CPT

## 2018-12-04 PROCEDURE — 700112 HCHG RX REV CODE 229: Performed by: NURSE PRACTITIONER

## 2018-12-04 PROCEDURE — 700102 HCHG RX REV CODE 250 W/ 637 OVERRIDE(OP): Performed by: NURSE PRACTITIONER

## 2018-12-04 PROCEDURE — 85027 COMPLETE CBC AUTOMATED: CPT

## 2018-12-04 PROCEDURE — 700111 HCHG RX REV CODE 636 W/ 250 OVERRIDE (IP): Performed by: NURSE PRACTITIONER

## 2018-12-04 PROCEDURE — 36415 COLL VENOUS BLD VENIPUNCTURE: CPT

## 2018-12-04 RX ORDER — CIPROFLOXACIN 500 MG/1
500 TABLET, FILM COATED ORAL EVERY 12 HOURS
Status: COMPLETED | OUTPATIENT
Start: 2018-12-04 | End: 2018-12-06

## 2018-12-04 RX ORDER — TIZANIDINE 4 MG/1
2-4 TABLET ORAL 3 TIMES DAILY PRN
Status: DISCONTINUED | OUTPATIENT
Start: 2018-12-04 | End: 2018-12-07 | Stop reason: HOSPADM

## 2018-12-04 RX ORDER — OXYCODONE HYDROCHLORIDE 5 MG/1
5-10 TABLET ORAL
Status: DISCONTINUED | OUTPATIENT
Start: 2018-12-04 | End: 2018-12-07 | Stop reason: HOSPADM

## 2018-12-04 RX ADMIN — POLYETHYLENE GLYCOL 3350 1 PACKET: 17 POWDER, FOR SOLUTION ORAL at 05:08

## 2018-12-04 RX ADMIN — POLYETHYLENE GLYCOL 3350 1 PACKET: 17 POWDER, FOR SOLUTION ORAL at 20:04

## 2018-12-04 RX ADMIN — TRAZODONE HYDROCHLORIDE 50 MG: 100 TABLET ORAL at 19:55

## 2018-12-04 RX ADMIN — GABAPENTIN 300 MG: 300 CAPSULE ORAL at 18:43

## 2018-12-04 RX ADMIN — TIZANIDINE 4 MG: 4 TABLET ORAL at 13:41

## 2018-12-04 RX ADMIN — DULOXETINE HYDROCHLORIDE 60 MG: 60 CAPSULE, DELAYED RELEASE ORAL at 05:07

## 2018-12-04 RX ADMIN — STANDARDIZED SENNA CONCENTRATE AND DOCUSATE SODIUM 1 TABLET: 8.6; 5 TABLET, FILM COATED ORAL at 19:57

## 2018-12-04 RX ADMIN — OXYCODONE HYDROCHLORIDE 5 MG: 5 TABLET ORAL at 08:47

## 2018-12-04 RX ADMIN — PENTOSAN POLYSULFATE SODIUM 100 MG: 100 CAPSULE, GELATIN COATED ORAL at 05:07

## 2018-12-04 RX ADMIN — ACETAMINOPHEN 650 MG: 325 TABLET, FILM COATED ORAL at 13:41

## 2018-12-04 RX ADMIN — ACETAMINOPHEN 650 MG: 325 TABLET, FILM COATED ORAL at 19:55

## 2018-12-04 RX ADMIN — GABAPENTIN 300 MG: 300 CAPSULE ORAL at 05:07

## 2018-12-04 RX ADMIN — GABAPENTIN 300 MG: 300 CAPSULE ORAL at 11:01

## 2018-12-04 RX ADMIN — LEVOTHYROXINE, LIOTHYRONINE 60 MG: 19; 4.5 TABLET ORAL at 05:07

## 2018-12-04 RX ADMIN — DOCUSATE SODIUM 100 MG: 100 CAPSULE, LIQUID FILLED ORAL at 18:43

## 2018-12-04 RX ADMIN — OXYCODONE HYDROCHLORIDE 5 MG: 5 TABLET ORAL at 05:07

## 2018-12-04 RX ADMIN — VANCOMYCIN HYDROCHLORIDE 900 MG: 100 INJECTION, POWDER, LYOPHILIZED, FOR SOLUTION INTRAVENOUS at 00:45

## 2018-12-04 RX ADMIN — METHENAMINE HIPPURATE 1 G: 1 TABLET ORAL at 05:07

## 2018-12-04 RX ADMIN — OXYCODONE HYDROCHLORIDE 10 MG: 5 TABLET ORAL at 19:56

## 2018-12-04 RX ADMIN — CIPROFLOXACIN HYDROCHLORIDE 500 MG: 500 TABLET, FILM COATED ORAL at 13:41

## 2018-12-04 RX ADMIN — DOCUSATE SODIUM 100 MG: 100 CAPSULE, LIQUID FILLED ORAL at 05:07

## 2018-12-04 RX ADMIN — VITAMIN D, TAB 1000IU (100/BT) 2000 UNITS: 25 TAB at 05:07

## 2018-12-04 RX ADMIN — OXYCODONE HYDROCHLORIDE 5 MG: 5 TABLET ORAL at 01:25

## 2018-12-04 RX ADMIN — LIOTHYRONINE SODIUM 5 MCG: 5 TABLET ORAL at 05:07

## 2018-12-04 RX ADMIN — POTASSIUM CHLORIDE AND SODIUM CHLORIDE: 900; 150 INJECTION, SOLUTION INTRAVENOUS at 15:31

## 2018-12-04 RX ADMIN — OXYCODONE HYDROCHLORIDE 10 MG: 5 TABLET ORAL at 11:01

## 2018-12-04 RX ADMIN — TIZANIDINE 2 MG: 4 TABLET ORAL at 05:07

## 2018-12-04 ASSESSMENT — COGNITIVE AND FUNCTIONAL STATUS - GENERAL
DRESSING REGULAR LOWER BODY CLOTHING: A LITTLE
CLIMB 3 TO 5 STEPS WITH RAILING: A LITTLE
SUGGESTED CMS G CODE MODIFIER DAILY ACTIVITY: CJ
SUGGESTED CMS G CODE MODIFIER DAILY ACTIVITY: CJ
MOVING TO AND FROM BED TO CHAIR: A LITTLE
SUGGESTED CMS G CODE MODIFIER MOBILITY: CK
WALKING IN HOSPITAL ROOM: A LITTLE
TOILETING: A LITTLE
MOBILITY SCORE: 18
MOBILITY SCORE: 19
DAILY ACTIVITIY SCORE: 22
MOVING FROM LYING ON BACK TO SITTING ON SIDE OF FLAT BED: A LITTLE
TURNING FROM BACK TO SIDE WHILE IN FLAT BAD: A LITTLE
CLIMB 3 TO 5 STEPS WITH RAILING: A LITTLE
HELP NEEDED FOR BATHING: A LITTLE
STANDING UP FROM CHAIR USING ARMS: A LITTLE
MOVING FROM LYING ON BACK TO SITTING ON SIDE OF FLAT BED: A LITTLE
STANDING UP FROM CHAIR USING ARMS: A LITTLE
MOVING TO AND FROM BED TO CHAIR: A LITTLE
TOILETING: A LITTLE
WALKING IN HOSPITAL ROOM: A LITTLE
DAILY ACTIVITIY SCORE: 22
HELP NEEDED FOR BATHING: A LITTLE
HELP NEEDED FOR BATHING: A LITTLE
SUGGESTED CMS G CODE MODIFIER MOBILITY: CK

## 2018-12-04 ASSESSMENT — GAIT ASSESSMENTS
ASSISTIVE DEVICE: FRONT WHEEL WALKER
GAIT LEVEL OF ASSIST: SUPERVISED
DISTANCE (FEET): 350
DEVIATION: BRADYKINETIC

## 2018-12-04 ASSESSMENT — PAIN SCALES - GENERAL
PAINLEVEL_OUTOF10: 7
PAINLEVEL_OUTOF10: 8
PAINLEVEL_OUTOF10: 3
PAINLEVEL_OUTOF10: 7
PAINLEVEL_OUTOF10: 0
PAINLEVEL_OUTOF10: 5

## 2018-12-04 ASSESSMENT — ACTIVITIES OF DAILY LIVING (ADL): TOILETING: INDEPENDENT

## 2018-12-04 ASSESSMENT — PATIENT HEALTH QUESTIONNAIRE - PHQ9
SUM OF ALL RESPONSES TO PHQ9 QUESTIONS 1 AND 2: 0
1. LITTLE INTEREST OR PLEASURE IN DOING THINGS: NOT AT ALL
2. FEELING DOWN, DEPRESSED, IRRITABLE, OR HOPELESS: NOT AT ALL

## 2018-12-04 NOTE — CARE PLAN
Problem: Safety  Goal: Will remain free from injury  Outcome: PROGRESSING AS EXPECTED  Alarms on and functioning.Tray table and call light in reach. Offered bathroom and discussed pain management.

## 2018-12-04 NOTE — OR NURSING
brought back to pacu to visit, still no room assignment.  Bed Controlled called by charge RN.  Maintenance issues on the floor.  Will bring  back to pacu when appropriate.

## 2018-12-04 NOTE — PROGRESS NOTES
Patient was transferred to unit from PACU. AOx4. She ambulated from rParadox to bed. Denies any numbness or tingling. Due assessments are completed. Education provided about safety and medications. Bed alarm in place.Call light within reach.

## 2018-12-04 NOTE — CARE PLAN
Problem: Communication  Goal: The ability to communicate needs accurately and effectively will improve  Outcome: PROGRESSING AS EXPECTED  Verbalizes understanding to use call light for needs. CHAU's and Rei abdi on.

## 2018-12-04 NOTE — PROGRESS NOTES
Received bedside report from alexi Nieves night shift RN. Surgery patient of .Surgery on 12/03/18 had L4-5 TLIF. Pain 4/10. Vitals WNL.

## 2018-12-04 NOTE — THERAPY
"Occupational Therapy Evaluation completed.   Functional Status: SBA w/supine>sit EOB, CGA w/LB dressing, SBA sit>Stand walking in room w/fww, SBA w/standing at sink for grooming, SBA w/cues for hand placement for toilet txf, remained up in chair post session w/alarm on and call light w/in reach Rn aware   Plan of Care: Will benefit from Occupational Therapy 2 times per week  Discharge Recommendations:  Equipment: Will Continue to Assess for Equipment Needs. Post-acute therapy Anticipate that the patient will have no further occupational therapy needs after discharge from the hospital.       See \"Rehab Therapy-Acute\" Patient Summary Report for complete documentation.    83 yr old female admitted for elective back sx, pt is POD#1, pt is demonstrating post op pain and decreased activity tolerance impacting ADL's pt reports good support at d/c, pt also as LSO however was not present during eval, NP notes indicate ok for therapy w/o brace. Pt will benefit acute OT and anticipate continued progression no to further post acute needs   "

## 2018-12-04 NOTE — PROGRESS NOTES
Neurosurgery Progress Note    Subjective:  Pt awake, alert  Rt leg soreness  Pain not well controlled  Thakkar out     Exam:    A&O x3, GCS 15  PERRL, EOMI  Face symm  LE str 5/5  Inc c/d/i with dermabond, hvac       BP  Min: 111/52  Max: 128/77  Pulse  Av.8  Min: 82  Max: 112  Resp  Av.5  Min: 12  Max: 25  Temp  Av.6 °C (97.9 °F)  Min: 36.2 °C (97.1 °F)  Max: 36.8 °C (98.3 °F)  SpO2  Av.7 %  Min: 91 %  Max: 100 %    No Data Recorded    Recent Labs      18   0313   WBC  6.8   RBC  3.01*   HEMOGLOBIN  9.5*   HEMATOCRIT  29.5*   MCV  96.7   MCH  31.6   MCHC  32.6*   RDW  48.5   PLATELETCT  188   MPV  9.6     Recent Labs      18   0313   SODIUM  131*   POTASSIUM  3.9   CHLORIDE  99   CO2  27   GLUCOSE  107*   BUN  14   CREATININE  0.63   CALCIUM  8.3*               Intake/Output       18 0700 - 18 0659 18 0700 - 18 0659      6529-2904 5379-1459 Total 1457-9159 5116-0804 Total       Intake    P.O.  200  -- 200  --  -- --    P.O. 200 -- 200 -- -- --    I.V.  1900  1100 3000  --  -- --    Crystalloid Intake 1900 -- 1900 -- -- --    Volume (mL) (0.9 % NaCl with KCl 20 mEq infusion) -- 1100 1100 -- -- --    Total Intake 2100 1100 3200 -- -- --       Output    Urine  350  2100 2450  --  -- --    Output (mL) ([REMOVED] Urethral Catheter Latex 16 Fr.) 350 2100 2450 -- -- --    Drains  300  140 440  --  -- --    Output (mL) (Closed/Suction Drain 3 Posterior Back Hemovac) 300 140 440 -- -- --    Total Output 650 2240 2890 -- -- --       Net I/O     1450 -1140 310 -- -- --            Intake/Output Summary (Last 24 hours) at 18 0910  Last data filed at 18 0600   Gross per 24 hour   Intake             3200 ml   Output             2890 ml   Net              310 ml            • acetaminophen  650 mg Q6HRS PRN   • [START ON 2018] alendronate  70 mg Q7 DAYS   • vitamin D  2,000 Units DAILY   • DULoxetine  60 mg DAILY   • gabapentin  300 mg TID   • liothyronine  5  mcg DAILY   • thyroid  60 mg DAILY   • traZODone  50 mg Nightly   • pentosan  100 mg DAILY   • methenamine hip  1 g DAILY   • Pharmacy Consult Request  1 Each PRN   • MD ALERT...DO NOT ADMINISTER NSAIDS or ASPIRIN unless ORDERED By Neurosurgery  1 Each PRN   • docusate sodium  100 mg BID   • senna-docusate  1 Tab Nightly   • senna-docusate  1 Tab Q24HRS PRN   • polyethylene glycol/lytes  1 Packet BID PRN   • magnesium hydroxide  30 mL QDAY PRN   • bisacodyl  10 mg Q24HRS PRN   • fleet  1 Each Once PRN   • 0.9 % NaCl with KCl 20 mEq 1,000 mL   Continuous   • oxyCODONE immediate-release  2.5 mg Q3HRS PRN   • oxyCODONE immediate-release  5 mg Q3HRS PRN   • morphine injection  2 mg Q3HRS PRN   • MD Alert...Vancomycin per Pharmacy   pharmacy to dose   • ondansetron  4 mg Q4HRS PRN   • ondansetron  4 mg Q4HRS PRN   • tizanidine  2 mg TID PRN   • hydrALAZINE  10 mg Q HOUR PRN   • cloNIDine  0.1 mg Q4HRS PRN     HVAC 120 cc/ 8 hrs    Assessment and Plan:  POD # 1 L4-5 TLIF  Prophylactic anticoagulation: no         Start date/time: tbd  PT/OT - LSO when oob,  will bring brace this am. Ok to get up with PT before brace arrives   Pain control - increase oxy IR  Bowel/ bladder program  Hyponatremia - repeat labs in am  Preop UTI - will tx with 5 days total of cipro and repeat urine before dc

## 2018-12-04 NOTE — CARE PLAN
Problem: Safety  Goal: Will remain free from falls  Patient is alert and oriented x 4, able to use call light appropriately. Safety education provided, verbalized understanding.     Problem: Pain Management  Goal: Pain level will decrease to patient's comfort goal  Pain is controlled with current pain management. Will continue to monitor for any side effects.

## 2018-12-04 NOTE — THERAPY
"Physical Therapy Evaluation completed.   Bed Mobility:  Supine to Sit: Stand by Assist (HOB flat, no rail, log roll)  Transfers: Sit to Stand: Stand by Assist  Gait: Level Of Assist: Supervised with Front-Wheel Walker       Plan of Care: Will benefit from Physical Therapy for DC needs  Discharge Recommendations: Equipment: Patient has FWW. Post-acute therapy: Discharge to home with outpatient or home health for additional skilled therapy services.    Patient is a pleasant 82 YO female s/p L4-5 TLIF with Dr. Berman on 12/3. Patient presented to PT with pain and decreased activity tolerance. Patient ambulated approximately 350ft with FWW and supervision and performed bed mobility with log roll with SBA and supervision. Provided patient education regarding spinal precautions, log roll, use of AD, use of LSO, and pain management techniques. Patient demonstrated understanding and good return demo. Will follow for DC needs. Recommend outpatient PT once medically cleared.    See \"Rehab Therapy-Acute\" Patient Summary Report for complete documentation.     "

## 2018-12-04 NOTE — OP REPORT
DATE OF SERVICE:  12/03/2018    PREOPERATIVE DIAGNOSES:  Lumbar 4-5 stenosis and spondylolisthesis,   recalcitrant to nonoperative measures.    POSTOPERATIVE DIAGNOSES:  Lumbar 4-5 stenosis and spondylolisthesis,   recalcitrant to nonoperative measures.    PROCEDURES PERFORMED:  1.  Lumbar 4-5 laminectomy and total facetectomy bilaterally.  2.  Bilateral neural foraminotomies of lumbar 4 and 5 roots.  3.  Right-sided diskectomy at lumbar 4-5 with interbody arthrodesis.  4.  Implantation of Globus expandable cage at lumbar 4-5.  5.  Pedicle screw instrumentation lumbar 4-5 using Medtronic Solera screws.  6.  Open reduction of spondylolisthesis.  7.  Posterolateral arthrodesis, lumbar 4-5.  8.  Use of locally harvested morselized autograft.  9.  Use of allograft.    SURGEON:  Giancarlo Berman MD    ASSISTANT:  ALAN Lara    ANESTHESIA:  General.    COMPLICATIONS:  None.    ESTIMATED BLOOD LOSS:  100 mL    DESCRIPTION OF PROCEDURE:  Patient was brought to the operating room,   identified in the usual fashion.  General endotracheal anesthesia was induced   by the anesthesia team.  Patient was then placed prone on the Aneesh table   with bolsters.  All pressure points were meticulously padded.  Midline lumbar   incision was marked in the skin using fluoroscopic guidance.  She was then   prepped and draped in usual sterile fashion.  Local anesthesia was infiltrated   in the subcutaneous tissue.  A 10 blade was used to incise the skin.    Dissection was carried down in subperiosteal fashion bilaterally.  Retractors   were put in place.  Great care was taken not to violate the joint capsule at   this point before we confirmed levels.  A Kocher was placed at what we   believed to be lumbar 4.  Film was taken confirming that we were at the   correct level.  This was then marked with a marking pen bilaterally.  We then   adjusted the retractors and performed a standard laminectomy and total   facetectomy at  lumbar 4 and 5 using a combination of Leksell rongeur,   high-speed air drill, Kerrison 2, 3, and 4 punches.  Patient had very severe   lateral recess stenosis and very severe scarring of the dura at this level,   which was quite difficult to separate and this was done meticulously with   micro instrument and upgoing curette and an 11 blade as well to cut off all of   this extra compressive tissue.  We identified the lumbar 4 roots bilaterally   at lumbar 4 and lumbar L5 pedicles.  Once we had excellent decompression of   the central canal we then used a nerve root retractor to retract the thecal   sac medially, 15 blade was used to incise the disk space.  We then used   neo to remove disk contents, now that were progressively larger, alley and   a pituitary were used to remove disk contents.  Antibiotic irrigation into   the disk space was used to remove additional disk contents as well.  A 7-13 mm   cage was then inserted into the disk space, gently countersunk using a tamp   after placing locally harvested morselized autograft into the disk space.    Film was taken confirming that we were at the correct level and the cage   looked to be in excellent position.  It was then expanded to 13 mm without   incident.  Film was taken confirming that it looked to be in excellent   position.  We then placed pedicle screws at lumbar 4 and 5 using the following   sequence of events.  High speed air drill was used to drill a  hole, 2.8   mm drill was used to cannulate the pedicle.  Garcia confirmed we had no   breach.  Tap was then used to the appropriate depth.  Garcia again confirmed   we had no breach.  We then placed a 6.5x55 mm screws at L4 and 50 mm screws at   L5.  There were no complications and no breaches with any of these.  We then   placed the appropriately sized rods and nuts at L5 and left the rods proud to   the L4 screws.  We then used dual reducers to reduce the jerome down to the L4   screws to reduce  the spondylolisthesis.  Film was taken confirming that we had   100% reduction of the spondylolisthesis and the hardware looked to be in   excellent position.  We then used final tightening nuts with a little bit of   compression to increase lordosis.  We then checked bilateral neural foramina   at lumbar 4 and 5 and decompressed a little bit more aggressively at this   point using Kerrison 2 and 3 punches.  FloSeal with gentle tamponade was used   for hemostasis.  Copious amounts of antibiotic irrigation were used to wash   out the wound.  We then decorticated the transverse processes of lumbar 4 and   5 using a high-speed air drill.  We packed locally harvested morselized   autograft and allograft into the gutters bilaterally for the posterolateral   arthrodesis.  We left a Hemovac drain in the epidural space, brought out   through a separate stab incision.  We left vancomycin powder on top of the   hardware.  We then closed the incision in layers and staples.  All sponge and   needle counts were correct x2 at the end the case.  I was present and scrubbed   for the entire procedure.  Patient awakened and was transferred to recovery   room in stable condition.       ____________________________________     HOLLI NAJERA MD    CPD / NTS    DD:  12/04/2018 11:13:49  DT:  12/04/2018 12:27:14    D#:  [id number]  Job#:  [id number]

## 2018-12-05 LAB
ANION GAP SERPL CALC-SCNC: 7 MMOL/L (ref 0–11.9)
APPEARANCE UR: ABNORMAL
BACTERIA #/AREA URNS HPF: NEGATIVE /HPF
BILIRUB UR QL STRIP.AUTO: NEGATIVE
BUN SERPL-MCNC: 17 MG/DL (ref 8–22)
CALCIUM SERPL-MCNC: 8.5 MG/DL (ref 8.5–10.5)
CHLORIDE SERPL-SCNC: 99 MMOL/L (ref 96–112)
CO2 SERPL-SCNC: 26 MMOL/L (ref 20–33)
COLOR UR: YELLOW
CREAT SERPL-MCNC: 0.72 MG/DL (ref 0.5–1.4)
EPI CELLS #/AREA URNS HPF: NEGATIVE /HPF
ERYTHROCYTE [DISTWIDTH] IN BLOOD BY AUTOMATED COUNT: 47.3 FL (ref 35.9–50)
GLUCOSE SERPL-MCNC: 99 MG/DL (ref 65–99)
GLUCOSE UR STRIP.AUTO-MCNC: NEGATIVE MG/DL
HCT VFR BLD AUTO: 29 % (ref 37–47)
HGB BLD-MCNC: 9.7 G/DL (ref 12–16)
HYALINE CASTS #/AREA URNS LPF: NORMAL /LPF
KETONES UR STRIP.AUTO-MCNC: NEGATIVE MG/DL
LEUKOCYTE ESTERASE UR QL STRIP.AUTO: NEGATIVE
MCH RBC QN AUTO: 31.4 PG (ref 27–33)
MCHC RBC AUTO-ENTMCNC: 33.4 G/DL (ref 33.6–35)
MCV RBC AUTO: 93.9 FL (ref 81.4–97.8)
MICRO URNS: ABNORMAL
NITRITE UR QL STRIP.AUTO: NEGATIVE
PH UR STRIP.AUTO: 6 [PH]
PLATELET # BLD AUTO: 176 K/UL (ref 164–446)
PMV BLD AUTO: 10.2 FL (ref 9–12.9)
POTASSIUM SERPL-SCNC: 4 MMOL/L (ref 3.6–5.5)
PROT UR QL STRIP: NEGATIVE MG/DL
RBC # BLD AUTO: 3.09 M/UL (ref 4.2–5.4)
RBC # URNS HPF: NORMAL /HPF
RBC UR QL AUTO: NEGATIVE
SODIUM SERPL-SCNC: 132 MMOL/L (ref 135–145)
SP GR UR STRIP.AUTO: 1.01
UROBILINOGEN UR STRIP.AUTO-MCNC: 0.2 MG/DL
WBC # BLD AUTO: 5.2 K/UL (ref 4.8–10.8)
WBC #/AREA URNS HPF: NORMAL /HPF

## 2018-12-05 PROCEDURE — 81001 URINALYSIS AUTO W/SCOPE: CPT

## 2018-12-05 PROCEDURE — 770001 HCHG ROOM/CARE - MED/SURG/GYN PRIV*

## 2018-12-05 PROCEDURE — 97535 SELF CARE MNGMENT TRAINING: CPT

## 2018-12-05 PROCEDURE — 700111 HCHG RX REV CODE 636 W/ 250 OVERRIDE (IP): Performed by: NEUROLOGICAL SURGERY

## 2018-12-05 PROCEDURE — A9270 NON-COVERED ITEM OR SERVICE: HCPCS | Performed by: NURSE PRACTITIONER

## 2018-12-05 PROCEDURE — 97530 THERAPEUTIC ACTIVITIES: CPT

## 2018-12-05 PROCEDURE — 97116 GAIT TRAINING THERAPY: CPT

## 2018-12-05 PROCEDURE — 700102 HCHG RX REV CODE 250 W/ 637 OVERRIDE(OP): Performed by: CLINICAL NURSE SPECIALIST

## 2018-12-05 PROCEDURE — 36415 COLL VENOUS BLD VENIPUNCTURE: CPT

## 2018-12-05 PROCEDURE — A9270 NON-COVERED ITEM OR SERVICE: HCPCS | Performed by: CLINICAL NURSE SPECIALIST

## 2018-12-05 PROCEDURE — 85027 COMPLETE CBC AUTOMATED: CPT

## 2018-12-05 PROCEDURE — 700112 HCHG RX REV CODE 229: Performed by: NURSE PRACTITIONER

## 2018-12-05 PROCEDURE — 700111 HCHG RX REV CODE 636 W/ 250 OVERRIDE (IP): Performed by: CLINICAL NURSE SPECIALIST

## 2018-12-05 PROCEDURE — 90471 IMMUNIZATION ADMIN: CPT

## 2018-12-05 PROCEDURE — 90662 IIV NO PRSV INCREASED AG IM: CPT | Performed by: NEUROLOGICAL SURGERY

## 2018-12-05 PROCEDURE — 3E02340 INTRODUCTION OF INFLUENZA VACCINE INTO MUSCLE, PERCUTANEOUS APPROACH: ICD-10-PCS | Performed by: NEUROLOGICAL SURGERY

## 2018-12-05 PROCEDURE — 700102 HCHG RX REV CODE 250 W/ 637 OVERRIDE(OP): Performed by: NURSE PRACTITIONER

## 2018-12-05 PROCEDURE — 80048 BASIC METABOLIC PNL TOTAL CA: CPT

## 2018-12-05 RX ADMIN — ACETAMINOPHEN 650 MG: 325 TABLET, FILM COATED ORAL at 05:12

## 2018-12-05 RX ADMIN — DOCUSATE SODIUM 100 MG: 100 CAPSULE, LIQUID FILLED ORAL at 17:18

## 2018-12-05 RX ADMIN — LIOTHYRONINE SODIUM 5 MCG: 5 TABLET ORAL at 05:13

## 2018-12-05 RX ADMIN — INFLUENZA A VIRUS A/MICHIGAN/45/2015 X-275 (H1N1) ANTIGEN (FORMALDEHYDE INACTIVATED), INFLUENZA A VIRUS A/SINGAPORE/INFIMH-16-0019/2016 IVR-186 (H3N2) ANTIGEN (FORMALDEHYDE INACTIVATED), AND INFLUENZA B VIRUS B/MARYLAND/15/2016 BX-69A (A B/COLORADO/6/2017-LIKE VIRUS) ANTIGEN (FORMALDEHYDE INACTIVATED) 0.5 ML: 60; 60; 60 INJECTION, SUSPENSION INTRAMUSCULAR at 22:00

## 2018-12-05 RX ADMIN — PENTOSAN POLYSULFATE SODIUM 100 MG: 100 CAPSULE, GELATIN COATED ORAL at 05:13

## 2018-12-05 RX ADMIN — DULOXETINE HYDROCHLORIDE 60 MG: 60 CAPSULE, DELAYED RELEASE ORAL at 05:14

## 2018-12-05 RX ADMIN — ALENDRONATE SODIUM 70 MG: 70 TABLET ORAL at 05:13

## 2018-12-05 RX ADMIN — GABAPENTIN 300 MG: 300 CAPSULE ORAL at 17:18

## 2018-12-05 RX ADMIN — OXYCODONE HYDROCHLORIDE 5 MG: 5 TABLET ORAL at 05:13

## 2018-12-05 RX ADMIN — MAGESIUM CITRATE 296 ML: 1.75 LIQUID ORAL at 12:59

## 2018-12-05 RX ADMIN — METHYLNALTREXONE BROMIDE 12 MG: 12 INJECTION, SOLUTION SUBCUTANEOUS at 21:56

## 2018-12-05 RX ADMIN — OXYCODONE HYDROCHLORIDE 10 MG: 5 TABLET ORAL at 21:58

## 2018-12-05 RX ADMIN — DOCUSATE SODIUM 100 MG: 100 CAPSULE, LIQUID FILLED ORAL at 05:13

## 2018-12-05 RX ADMIN — OXYCODONE HYDROCHLORIDE 10 MG: 5 TABLET ORAL at 14:01

## 2018-12-05 RX ADMIN — GABAPENTIN 300 MG: 300 CAPSULE ORAL at 05:13

## 2018-12-05 RX ADMIN — LEVOTHYROXINE, LIOTHYRONINE 60 MG: 19; 4.5 TABLET ORAL at 05:14

## 2018-12-05 RX ADMIN — GABAPENTIN 300 MG: 300 CAPSULE ORAL at 13:00

## 2018-12-05 RX ADMIN — CIPROFLOXACIN HYDROCHLORIDE 500 MG: 500 TABLET, FILM COATED ORAL at 17:18

## 2018-12-05 RX ADMIN — CIPROFLOXACIN HYDROCHLORIDE 500 MG: 500 TABLET, FILM COATED ORAL at 05:14

## 2018-12-05 RX ADMIN — TRAZODONE HYDROCHLORIDE 50 MG: 100 TABLET ORAL at 21:56

## 2018-12-05 RX ADMIN — METHENAMINE HIPPURATE 1 G: 1 TABLET ORAL at 05:13

## 2018-12-05 RX ADMIN — VITAMIN D, TAB 1000IU (100/BT) 2000 UNITS: 25 TAB at 05:13

## 2018-12-05 RX ADMIN — BISACODYL 10 MG: 10 SUPPOSITORY RECTAL at 10:23

## 2018-12-05 RX ADMIN — STANDARDIZED SENNA CONCENTRATE AND DOCUSATE SODIUM 1 TABLET: 8.6; 5 TABLET, FILM COATED ORAL at 21:58

## 2018-12-05 ASSESSMENT — COGNITIVE AND FUNCTIONAL STATUS - GENERAL
DRESSING REGULAR UPPER BODY CLOTHING: A LITTLE
SUGGESTED CMS G CODE MODIFIER MOBILITY: CK
STANDING UP FROM CHAIR USING ARMS: A LITTLE
MOBILITY SCORE: 18
DRESSING REGULAR LOWER BODY CLOTHING: A LITTLE
CLIMB 3 TO 5 STEPS WITH RAILING: A LITTLE
WALKING IN HOSPITAL ROOM: A LITTLE
MOVING TO AND FROM BED TO CHAIR: A LITTLE
TURNING FROM BACK TO SIDE WHILE IN FLAT BAD: A LITTLE
HELP NEEDED FOR BATHING: A LITTLE
DAILY ACTIVITIY SCORE: 21
MOVING FROM LYING ON BACK TO SITTING ON SIDE OF FLAT BED: A LITTLE
SUGGESTED CMS G CODE MODIFIER DAILY ACTIVITY: CJ

## 2018-12-05 ASSESSMENT — PAIN SCALES - GENERAL
PAINLEVEL_OUTOF10: 1
PAINLEVEL_OUTOF10: 5
PAINLEVEL_OUTOF10: 3
PAINLEVEL_OUTOF10: 9
PAINLEVEL_OUTOF10: 3
PAINLEVEL_OUTOF10: 2
PAINLEVEL_OUTOF10: 7

## 2018-12-05 ASSESSMENT — GAIT ASSESSMENTS
GAIT LEVEL OF ASSIST: STAND BY ASSIST
ASSISTIVE DEVICE: FRONT WHEEL WALKER
DISTANCE (FEET): 350
DEVIATION: ANTALGIC;BRADYKINETIC;DECREASED HEEL STRIKE;DECREASED TOE OFF

## 2018-12-05 ASSESSMENT — LIFESTYLE VARIABLES: ALCOHOL_USE: NO

## 2018-12-05 NOTE — DISCHARGE PLANNING
Aware of PMR referral from [facility]. POD 2 s/p Lumbar 4-5 laminectomy and total facetectomy bilaterally. Current documentation does not support 2/3 therapy need meeting CMS criteria for IRF level care. PT 12/05 reflects pt supervised for transfers, SBA w/FWW ambulated 350ft. DC disposition dependent on progression during acute stay, anticipate patient will progress during acute stay and be able to return home with outpatient PT. OT 12/04 SBA for grooming, toileting, CGA for LB dressing. Anticipate that the patient will have no further occupational therapy needs after discharge from the hospital. This referral will not be forwarded to Physiatry for consult per protocol at this time. If there are interval changes, please reach out to [facility] [phone]. Thank you for the referral.

## 2018-12-05 NOTE — PROGRESS NOTES
Received order for urinalysis.  Patient had documented UTI on November 28, 2018 which was treated with a course of antibiotics. Patient was admitted for surgery on 12/3/2018 and had a broussard catheter in place at that time.  Discussed with Pamella Garcia.  Per Pamella Garcia please collect urine for urinalysis anyway.

## 2018-12-05 NOTE — CARE PLAN
Problem: Infection  Goal: Will remain free from infection  Patient remains free from infection.  Afebrile.  No complaints of chills.    Problem: Bowel/Gastric:  Goal: Normal bowel function is maintained or improved  Patient having difficulty having a bowel movement.  Administered stool softeners, suppository and magnesium citrate.      Problem: Urinary Elimination:  Goal: Ability to reestablish a normal urinary elimination pattern will improve  Patient calls appropriately for assistance to use the bathroom to urinate.

## 2018-12-05 NOTE — THERAPY
"Physical Therapy Treatment completed.   Bed Mobility:  Supine to Sit: (NT, in chair upon entry)  Transfers: Sit to Stand: Supervised  Gait: Level Of Assist: Stand by Assist with Front-Wheel Walker       Plan of Care: Will benefit from Physical Therapy 3 times per week  Discharge Recommendations: Equipment: Patient has FWW. Post-acute therapy: Discharge to home with outpatient or home health for additional skilled therapy services.     Patient with increased pain this session which is limiting her activity tolerance and functional mobility. Patient required increased assist for ambulation (SBA from supervision level) and with decreased william and increased reliance on FWW. Patient ascended/descended 2 steps with CGA. Patient with good return demo of spinal precautions and log roll. Will continue to follow. DC disposition dependent on progression during acute stay, anticipate patient will progress during acute stay and be able to return home with outpatient PT.    See \"Rehab Therapy-Acute\" Patient Summary Report for complete documentation.       "

## 2018-12-05 NOTE — PROGRESS NOTES
Neurosurgery Progress Note    Subjective:  C/o constipation- has long hx, no bm since sat, some flatus, feels bloated, legs ok, pain controlled better, amb some, voidig, eating    Exam:    LE str   Inc c/d/i with dermabond, hvac 60      BP  Min: 83/41  Max: 138/73  Pulse  Av.2  Min: 84  Max: 96  Resp  Av  Min: 16  Max: 16  Temp  Av °C (98.6 °F)  Min: 36.8 °C (98.2 °F)  Max: 37.2 °C (99 °F)  SpO2  Av.7 %  Min: 91 %  Max: 98 %    No Data Recorded    Recent Labs      18   0313  18   0249   WBC  6.8  5.2   RBC  3.01*  3.09*   HEMOGLOBIN  9.5*  9.7*   HEMATOCRIT  29.5*  29.0*   MCV  96.7  93.9   MCH  31.6  31.4   MCHC  32.6*  33.4*   RDW  48.5  47.3   PLATELETCT  188  176   MPV  9.6  10.2     Recent Labs      18   0313  18   0249   SODIUM  131*  132*   POTASSIUM  3.9  4.0   CHLORIDE  99  99   CO2  27  26   GLUCOSE  107*  99   BUN  14  17   CREATININE  0.63  0.72   CALCIUM  8.3*  8.5               Intake/Output       18 07 - 18 0659 18 07 - 18 0659       Total 1900-0659 Total       Intake    P.O.  --  600 600  --  -- --    P.O. -- 600 600 -- -- --    Total Intake -- 600 600 -- -- --       Output    Urine  --  -- --  --  -- --    Number of Times Voided -- 2 x 2 x -- -- --    Drains  70  100 170  --  -- --    Output (mL) (Closed/Suction Drain 3 Posterior Back Hemovac) 70 100 170 -- -- --    Total Output 70 100 170 -- -- --       Net I/O     -70 500 430 -- -- --            Intake/Output Summary (Last 24 hours) at 18 0935  Last data filed at 18 0400   Gross per 24 hour   Intake              600 ml   Output              170 ml   Net              430 ml            • magnesium citrate  296 mL Once   • oxyCODONE immediate-release  5-10 mg Q3HRS PRN   • tizanidine  2-4 mg TID PRN   • ciprofloxacin  500 mg Q12HRS   • acetaminophen  650 mg Q6HRS PRN   • alendronate  70 mg Q7 DAYS   • vitamin D  2,000 Units DAILY   •  DULoxetine  60 mg DAILY   • gabapentin  300 mg TID   • liothyronine  5 mcg DAILY   • thyroid  60 mg DAILY   • traZODone  50 mg Nightly   • pentosan  100 mg DAILY   • methenamine hip  1 g DAILY   • Pharmacy Consult Request  1 Each PRN   • MD JONES...DO NOT ADMINISTER NSAIDS or ASPIRIN unless ORDERED By Neurosurgery  1 Each PRN   • docusate sodium  100 mg BID   • senna-docusate  1 Tab Nightly   • senna-docusate  1 Tab Q24HRS PRN   • polyethylene glycol/lytes  1 Packet BID PRN   • magnesium hydroxide  30 mL QDAY PRN   • bisacodyl  10 mg Q24HRS PRN   • fleet  1 Each Once PRN   • 0.9 % NaCl with KCl 20 mEq 1,000 mL   Continuous   • morphine injection  2 mg Q3HRS PRN   • ondansetron  4 mg Q4HRS PRN   • ondansetron  4 mg Q4HRS PRN   • hydrALAZINE  10 mg Q HOUR PRN   • cloNIDine  0.1 mg Q4HRS PRN     HVAC 120 cc/ 8 hrs    Assessment and Plan:  POD # 2 L4-5 TLIF  Prophylactic anticoagulation: no         Start date/time: tbd  PT/OT - LSO when oob  Pain control -cont oxy IR  Bowel/ bladder program- hx of constipation, will give supp then mag citrate, if no bm then relistor  Hyponatremia - stable, sligtly improved at 132  Preop UTI - will tx with 5 days total of cipro and repeat urine today  Cont hvac  May need rehab/snf- consults placed

## 2018-12-05 NOTE — PROGRESS NOTES
Received bedside report from night shift RN. Assumed care of patient. Pt assessed and stable. VSS. Patient reports 0/10 pain at this time.  Discussed plan of care for day with patient and received verbal understanding. Call light within reach, bed alarm active, bed in low position.

## 2018-12-05 NOTE — THERAPY
"Occupational Therapy Treatment completed with focus on ADLs, ADL transfers and patient education.  Functional Status:  Up in room at sink w/RN, spv w/grooming standing, CGA w/don/doff of LSO, walking in room and hallway w/fww c/o increasing pain during activity RN aware and medicated. Reviewed all spinal precautions, Requested BTB, SBA w/log roll. Alarm on call light and tray table w/in reach   Plan of Care: Will benefit from Occupational Therapy 2 times per week  Discharge Recommendations:  Equipment Will Continue to Assess for Equipment Needs. May benefit from HH to maximize home safety and independence w/ADL's as needed, pt does report good support form SO     See \"Rehab Therapy-Acute\" Patient Summary Report for complete documentation.   "

## 2018-12-05 NOTE — CARE PLAN
Problem: Safety  Goal: Will remain free from falls  Outcome: PROGRESSING AS EXPECTED      Problem: Venous Thromboembolism (VTW)/Deep Vein Thrombosis (DVT) Prevention:  Goal: Patient will participate in Venous Thrombosis (VTE)/Deep Vein Thrombosis (DVT)Prevention Measures  Outcome: PROGRESSING AS EXPECTED      Problem: Knowledge Deficit  Goal: Knowledge of disease process/condition, treatment plan, diagnostic tests, and medications will improve  Outcome: PROGRESSING AS EXPECTED

## 2018-12-06 PROCEDURE — 770001 HCHG ROOM/CARE - MED/SURG/GYN PRIV*

## 2018-12-06 PROCEDURE — 700111 HCHG RX REV CODE 636 W/ 250 OVERRIDE (IP): Performed by: NURSE PRACTITIONER

## 2018-12-06 PROCEDURE — A9270 NON-COVERED ITEM OR SERVICE: HCPCS | Performed by: NURSE PRACTITIONER

## 2018-12-06 PROCEDURE — 700101 HCHG RX REV CODE 250: Performed by: NURSE PRACTITIONER

## 2018-12-06 PROCEDURE — 700102 HCHG RX REV CODE 250 W/ 637 OVERRIDE(OP): Performed by: NURSE PRACTITIONER

## 2018-12-06 PROCEDURE — 700112 HCHG RX REV CODE 229: Performed by: NURSE PRACTITIONER

## 2018-12-06 RX ORDER — TIZANIDINE 4 MG/1
4 TABLET ORAL 3 TIMES DAILY PRN
Qty: 30 TAB | Refills: 0 | OUTPATIENT
Start: 2018-12-06 | End: 2021-06-10

## 2018-12-06 RX ORDER — PSEUDOEPHEDRINE HCL 30 MG
100 TABLET ORAL 2 TIMES DAILY
Qty: 60 CAP | COMMUNITY
Start: 2018-12-06 | End: 2022-01-20

## 2018-12-06 RX ORDER — ENEMA 19; 7 G/133ML; G/133ML
1 ENEMA RECTAL ONCE
Status: COMPLETED | OUTPATIENT
Start: 2018-12-06 | End: 2018-12-06

## 2018-12-06 RX ADMIN — OXYCODONE HYDROCHLORIDE 5 MG: 5 TABLET ORAL at 14:57

## 2018-12-06 RX ADMIN — LIOTHYRONINE SODIUM 5 MCG: 5 TABLET ORAL at 04:20

## 2018-12-06 RX ADMIN — OXYCODONE HYDROCHLORIDE 5 MG: 5 TABLET ORAL at 11:01

## 2018-12-06 RX ADMIN — TIZANIDINE 2 MG: 4 TABLET ORAL at 14:58

## 2018-12-06 RX ADMIN — GABAPENTIN 300 MG: 300 CAPSULE ORAL at 04:21

## 2018-12-06 RX ADMIN — PENTOSAN POLYSULFATE SODIUM 100 MG: 100 CAPSULE, GELATIN COATED ORAL at 04:20

## 2018-12-06 RX ADMIN — METHENAMINE HIPPURATE 1 G: 1 TABLET ORAL at 04:20

## 2018-12-06 RX ADMIN — BISACODYL 10 MG: 10 SUPPOSITORY RECTAL at 16:37

## 2018-12-06 RX ADMIN — DOCUSATE SODIUM 100 MG: 100 CAPSULE, LIQUID FILLED ORAL at 04:20

## 2018-12-06 RX ADMIN — VITAMIN D, TAB 1000IU (100/BT) 2000 UNITS: 25 TAB at 04:20

## 2018-12-06 RX ADMIN — DULOXETINE HYDROCHLORIDE 60 MG: 60 CAPSULE, DELAYED RELEASE ORAL at 04:20

## 2018-12-06 RX ADMIN — CIPROFLOXACIN HYDROCHLORIDE 500 MG: 500 TABLET, FILM COATED ORAL at 04:21

## 2018-12-06 RX ADMIN — SODIUM PHOSPHATE 133 ML: 7; 19 ENEMA RECTAL at 10:45

## 2018-12-06 RX ADMIN — TRAZODONE HYDROCHLORIDE 50 MG: 100 TABLET ORAL at 21:25

## 2018-12-06 RX ADMIN — GABAPENTIN 300 MG: 300 CAPSULE ORAL at 18:22

## 2018-12-06 RX ADMIN — STANDARDIZED SENNA CONCENTRATE AND DOCUSATE SODIUM 1 TABLET: 8.6; 5 TABLET, FILM COATED ORAL at 21:24

## 2018-12-06 RX ADMIN — DOCUSATE SODIUM 100 MG: 100 CAPSULE, LIQUID FILLED ORAL at 18:22

## 2018-12-06 RX ADMIN — POLYETHYLENE GLYCOL 3350 1 PACKET: 17 POWDER, FOR SOLUTION ORAL at 21:24

## 2018-12-06 RX ADMIN — METHYLNALTREXONE BROMIDE 12 MG: 12 INJECTION, SOLUTION SUBCUTANEOUS at 14:53

## 2018-12-06 RX ADMIN — OXYCODONE HYDROCHLORIDE 5 MG: 5 TABLET ORAL at 21:25

## 2018-12-06 RX ADMIN — OXYCODONE HYDROCHLORIDE 5 MG: 5 TABLET ORAL at 07:18

## 2018-12-06 RX ADMIN — GABAPENTIN 300 MG: 300 CAPSULE ORAL at 12:57

## 2018-12-06 RX ADMIN — LEVOTHYROXINE, LIOTHYRONINE 60 MG: 19; 4.5 TABLET ORAL at 04:20

## 2018-12-06 ASSESSMENT — PATIENT HEALTH QUESTIONNAIRE - PHQ9
2. FEELING DOWN, DEPRESSED, IRRITABLE, OR HOPELESS: NOT AT ALL
1. LITTLE INTEREST OR PLEASURE IN DOING THINGS: NOT AT ALL
SUM OF ALL RESPONSES TO PHQ9 QUESTIONS 1 AND 2: 0

## 2018-12-06 ASSESSMENT — PAIN SCALES - GENERAL
PAINLEVEL_OUTOF10: 3
PAINLEVEL_OUTOF10: 5
PAINLEVEL_OUTOF10: 4
PAINLEVEL_OUTOF10: 7
PAINLEVEL_OUTOF10: 6
PAINLEVEL_OUTOF10: 4

## 2018-12-06 NOTE — PROGRESS NOTES
Pt aaox4. ISSA 5/5. Denies N/T. Denies N/V. Up w/ SBA, steady gait with FWW. LSO on when OOB. Pt c/o back pain, controlled with PO pain meds PRN. +BS, no BM- will give enema per order. Voiding w/o difficulty. Reviewed poc with pt-verbalized understanding. Pt to d/c home with home health if BM and cleared by therapy. Call light in reach.

## 2018-12-06 NOTE — FACE TO FACE
Face to Face Supporting Documentation - Home Health    The encounter with this patient was in whole or in part the primary reason for home health admission.    Date of encounter:   Patient:                    MRN:                       YOB: 2016  Susan Menendez  [id number]  1935     Home health to see patient for:  Skilled Nursing care for assessment, interventions & education and Physical Therapy evaluation and treatment    Skilled need for:  Surgical Aftercare monitor inc, meds, help with ambulation.     Skilled nursing interventions to include:  Comment: see above     Homebound status evidenced by:  Need the aid of supportive devices such as crutches, canes, wheelchairs or walkers. Leaving home requires a considerable and taxing effort. There is a normal inability to leave the home.    Community Physician to provide follow up care: Mya Massey M.D.     Optional Interventions? No      I certify the face to face encounter for this home health care referral meets the CMS requirements and the encounter/clinical assessment with the patient was, in whole, or in part, for the medical condition(s) listed above, which is the primary reason for home health care. Based on my clinical findings: the service(s) are medically necessary, support the need for home health care, and the homebound criteria are met.  I certify that this patient has had a face to face encounter by myself.  YANIRA Mcclain. - NPI: [id number]

## 2018-12-06 NOTE — DISCHARGE PLANNING
Agency/Facility Name: [facility]  Spoke To: Li  Outcome: Patient accepted. Aileen(LSW) notified.

## 2018-12-06 NOTE — DISCHARGE PLANNING
Anticipated Discharge Disposition: Home w/ HH    Action: SW met with pt at bedside regarding HH.  Pt chose [facility].  SW faxed choice to [facility].    Barriers to Discharge: Pending HH acceptance    Plan: F/U on referral.

## 2018-12-06 NOTE — DISCHARGE PLANNING
Received Choice form at 1037  Agency/Facility Name: [facility]  Referral sent per Choice form @ [phone]

## 2018-12-06 NOTE — PROGRESS NOTES
Neurosurgery Progress Note    Subjective:  Pt seen and evaluated by Dr. Berman   C/o lbp  No rad symptoms  Voiding, No BM yet     Exam:    LE str 5/  Inc c/d/i with dermabond      BP  Min: 101/69  Max: 153/86  Pulse  Av  Min: 76  Max: 98  Resp  Av.3  Min: 16  Max: 17  Temp  Av.6 °C (97.8 °F)  Min: 36.2 °C (97.2 °F)  Max: 37.3 °C (99.2 °F)  SpO2  Av.3 %  Min: 92 %  Max: 97 %    No Data Recorded    Recent Labs      18   0313  18   0249   WBC  6.8  5.2   RBC  3.01*  3.09*   HEMOGLOBIN  9.5*  9.7*   HEMATOCRIT  29.5*  29.0*   MCV  96.7  93.9   MCH  31.6  31.4   MCHC  32.6*  33.4*   RDW  48.5  47.3   PLATELETCT  188  176   MPV  9.6  10.2     Recent Labs      183  18   0249   SODIUM  131*  132*   POTASSIUM  3.9  4.0   CHLORIDE  99  99   CO2  27  26   GLUCOSE  107*  99   BUN  14  17   CREATININE  0.63  0.72   CALCIUM  8.3*  8.5               Intake/Output       18 07 - 18 0659 18 07 - 18 0659      [id number] 9439-4067 Total  2599-7687 Total       Intake    Total Intake -- -- -- -- -- --       Output    Urine  --  -- --  --  -- --    Number of Times Voided 6 x 4 x 10 x -- -- --    Drains  20  5 25  --  -- --    Output (mL) ([REMOVED] Closed/Suction Drain 3 Posterior Back Hemovac) 20 5 25 -- -- --    Stool  --  -- --  --  -- --    Number of Times Stooled 0 x -- 0 x -- -- --    Total Output 20 5 25 -- -- --       Net I/O     -20 -5 -25 -- -- --            Intake/Output Summary (Last 24 hours) at 18 0859  Last data filed at 18   Gross per 24 hour   Intake                0 ml   Output               25 ml   Net              -25 ml            • methylnaltrexone  12 mg Q48HRS PRN   • oxyCODONE immediate-release  5-10 mg Q3HRS PRN   • tizanidine  2-4 mg TID PRN   • acetaminophen  650 mg Q6HRS PRN   • alendronate  70 mg Q7 DAYS   • vitamin D  2,000 Units DAILY   • DULoxetine  60 mg DAILY   • gabapentin  300 mg TID   • liothyronine   5 mcg DAILY   • thyroid  60 mg DAILY   • traZODone  50 mg Nightly   • pentosan  100 mg DAILY   • methenamine hip  1 g DAILY   • Pharmacy Consult Request  1 Each PRN   • MD ALERT...DO NOT ADMINISTER NSAIDS or ASPIRIN unless ORDERED By Neurosurgery  1 Each PRN   • docusate sodium  100 mg BID   • senna-docusate  1 Tab Nightly   • senna-docusate  1 Tab Q24HRS PRN   • polyethylene glycol/lytes  1 Packet BID PRN   • magnesium hydroxide  30 mL QDAY PRN   • bisacodyl  10 mg Q24HRS PRN   • fleet  1 Each Once PRN   • 0.9 % NaCl with KCl 20 mEq 1,000 mL   Continuous   • morphine injection  2 mg Q3HRS PRN   • ondansetron  4 mg Q4HRS PRN   • ondansetron  4 mg Q4HRS PRN   • hydrALAZINE  10 mg Q HOUR PRN   • cloNIDine  0.1 mg Q4HRS PRN         Assessment and Plan:  POD # 3 L4-5 TLIF  Prophylactic anticoagulation: no         Start date/time: tbd  PT/OT - LSO when oob  Pain control -cont oxy IR  Bowel/ bladder program- will give enema this am, if no bm will repeat the relistor  Hyponatremia - stable  Preop UTI - negative urine yest  Home later today if pt has BM   Has fww at home, will order home health   Will send DC scripts through office computer to pharmacy

## 2018-12-06 NOTE — DISCHARGE INSTRUCTIONS
Discharge Instructions    Discharged to home by car with relative. Discharged via wheelchair, hospital escort: Yes.  Special equipment needed: Walker    Be sure to schedule a follow-up appointment with your primary care doctor or any specialists as instructed.     Discharge Plan:   Pneumococcal Vaccine Administered/Refused: Not given - Patient refused pneumococcal vaccine  Influenza Vaccine Indication: Patient Refuses  Influenza Vaccine Given - only chart on this line when given: Influenza Vaccine Given (See MAR)    I understand that a diet low in cholesterol, fat, and sodium is recommended for good health. Unless I have been given specific instructions below for another diet, I accept this instruction as my diet prescription.   Other diet: Regular    Special Instructions:   Follow up with [facility] at [facility] in 2 weeks [phone]   Follow up with Dr. Berman in 6 weeks   No pushing, pulling, lifting greater than 10 pounds   No repetitive bending, no twisting   Ok to shower, pat incision dry - 24 hours after drain was removed   No non-steroidal anti-inflammatory medications or aspirin until cleared by Dr. Berman   Ambulate as much is comfortable   No driving for at least 2 weeks following surgery or until cleared   Obtain over the counter senekot take 1-2 tablets daily while taking narcotic pain medication   Wear brace at all times when out of bed, may shower without brace.    · Is patient discharged on Warfarin / Coumadin?   No     Depression / Suicide Risk    As you are discharged from this [facility] facility, it is important to learn how to keep safe from harming yourself.    Recognize the warning signs:  · Abrupt changes in personality, positive or negative- including increase in energy   · Giving away possessions  · Change in eating patterns- significant weight changes-  positive or negative  · Change in sleeping patterns- unable to sleep or sleeping all the time   · Unwillingness or inability to  communicate  · Depression  · Unusual sadness, discouragement and loneliness  · Talk of wanting to die  · Neglect of personal appearance   · Rebelliousness- reckless behavior  · Withdrawal from people/activities they love  · Confusion- inability to concentrate     If you or a loved one observes any of these behaviors or has concerns about self-harm, here's what you can do:  · Talk about it- your feelings and reasons for harming yourself  · Remove any means that you might use to hurt yourself (examples: pills, rope, extension cords, firearm)  · Get professional help from the community (Mental Health, Substance Abuse, psychological counseling)  · Do not be alone:Call your Safe Contact- someone whom you trust who will be there for you.  · Call your local CRISIS HOTLINE 605-2566 or 645-609-4566  · Call your local Children's Mobile Crisis Response Team [place] (325) 897-1461 or www.[employer]  · Call the toll free National Suicide Prevention Hotlines   · National Suicide Prevention Lifeline 867-894-WXXK (4532)  · National Hope Line Network 800-SUICIDE (460-1592)

## 2018-12-06 NOTE — CARE PLAN
Problem: Safety  Goal: Will remain free from falls  Outcome: PROGRESSING AS EXPECTED  Treaded socks in place, bed alarm on, call light in reach, appropriate assistive devices used, pt calls appropriately.    Problem: Bowel/Gastric:  Goal: Will not experience complications related to bowel motility  Outcome: PROGRESSING SLOWER THAN EXPECTED  Pt receiving bowel protocol to have BM.

## 2018-12-06 NOTE — PROGRESS NOTES
Received report from day RN. Assumed care at 1900. Pt A&Ox4. Reporting 9/10 back pain, medicated per MAR. Pt has not had a BM in over 72hours, medicated her with sub Q Relistor, as mag citrate and suppository id not work this morning. Discussed plan of care with pt. Pt resting comfortably, treaded socks in place, bed locked and in lowest position, bed alarm on, call light in reach, SCDs on.

## 2018-12-07 VITALS
WEIGHT: 135.8 LBS | HEIGHT: 67 IN | HEART RATE: 84 BPM | BODY MASS INDEX: 21.31 KG/M2 | TEMPERATURE: 97.2 F | OXYGEN SATURATION: 91 % | SYSTOLIC BLOOD PRESSURE: 103 MMHG | DIASTOLIC BLOOD PRESSURE: 55 MMHG | RESPIRATION RATE: 17 BRPM

## 2018-12-07 PROCEDURE — 700102 HCHG RX REV CODE 250 W/ 637 OVERRIDE(OP): Performed by: NURSE PRACTITIONER

## 2018-12-07 PROCEDURE — A9270 NON-COVERED ITEM OR SERVICE: HCPCS | Performed by: NURSE PRACTITIONER

## 2018-12-07 PROCEDURE — 700112 HCHG RX REV CODE 229: Performed by: NURSE PRACTITIONER

## 2018-12-07 PROCEDURE — 700101 HCHG RX REV CODE 250: Performed by: NURSE PRACTITIONER

## 2018-12-07 PROCEDURE — 700111 HCHG RX REV CODE 636 W/ 250 OVERRIDE (IP): Performed by: NURSE PRACTITIONER

## 2018-12-07 RX ADMIN — OXYCODONE HYDROCHLORIDE 10 MG: 5 TABLET ORAL at 09:13

## 2018-12-07 RX ADMIN — METHYLNALTREXONE BROMIDE 8 MG: 12 INJECTION, SOLUTION SUBCUTANEOUS at 12:44

## 2018-12-07 RX ADMIN — MAGNESIUM HYDROXIDE 30 ML: 400 SUSPENSION ORAL at 09:26

## 2018-12-07 RX ADMIN — LIOTHYRONINE SODIUM 5 MCG: 5 TABLET ORAL at 05:50

## 2018-12-07 RX ADMIN — SODIUM PHOSPHATE 133 ML: 7; 19 ENEMA RECTAL at 00:55

## 2018-12-07 RX ADMIN — DOCUSATE SODIUM 100 MG: 100 CAPSULE, LIQUID FILLED ORAL at 05:49

## 2018-12-07 RX ADMIN — GABAPENTIN 300 MG: 300 CAPSULE ORAL at 05:50

## 2018-12-07 RX ADMIN — OXYCODONE HYDROCHLORIDE 5 MG: 5 TABLET ORAL at 00:54

## 2018-12-07 RX ADMIN — TIZANIDINE 4 MG: 4 TABLET ORAL at 05:49

## 2018-12-07 RX ADMIN — LEVOTHYROXINE, LIOTHYRONINE 60 MG: 19; 4.5 TABLET ORAL at 05:50

## 2018-12-07 RX ADMIN — METHENAMINE HIPPURATE 1 G: 1 TABLET ORAL at 05:50

## 2018-12-07 RX ADMIN — VITAMIN D, TAB 1000IU (100/BT) 2000 UNITS: 25 TAB at 05:50

## 2018-12-07 RX ADMIN — PENTOSAN POLYSULFATE SODIUM 100 MG: 100 CAPSULE, GELATIN COATED ORAL at 05:50

## 2018-12-07 RX ADMIN — GABAPENTIN 300 MG: 300 CAPSULE ORAL at 11:24

## 2018-12-07 RX ADMIN — OXYCODONE HYDROCHLORIDE 5 MG: 5 TABLET ORAL at 05:49

## 2018-12-07 RX ADMIN — DULOXETINE HYDROCHLORIDE 60 MG: 60 CAPSULE, DELAYED RELEASE ORAL at 05:50

## 2018-12-07 ASSESSMENT — PAIN SCALES - GENERAL
PAINLEVEL_OUTOF10: 6
PAINLEVEL_OUTOF10: 7
PAINLEVEL_OUTOF10: 6

## 2018-12-07 NOTE — PROGRESS NOTES
Neurosurgery Progress Note    Subjective:  Pt seen and evaluated by Dr. Berman   C/o lbp  No rad symptoms  Voiding, No BM yet     Exam:    LE str 5/5 except right IP 4/5 seconary t o right groin pain  Inc c/d/i with dermabond      BP  Min: 103/55  Max: 142/67  Pulse  Av.8  Min: 77  Max: 91  Resp  Av.5  Min: 16  Max: 17  Temp  Av.6 °C (97.9 °F)  Min: 36.2 °C (97.2 °F)  Max: 36.9 °C (98.5 °F)  SpO2  Av.7 %  Min: 90 %  Max: 91 %    No Data Recorded    Recent Labs      18   0249   WBC  5.2   RBC  3.09*   HEMOGLOBIN  9.7*   HEMATOCRIT  29.0*   MCV  93.9   MCH  31.4   MCHC  33.4*   RDW  47.3   PLATELETCT  176   MPV  10.2     Recent Labs      18   0249   SODIUM  132*   POTASSIUM  4.0   CHLORIDE  99   CO2  26   GLUCOSE  99   BUN  17   CREATININE  0.72   CALCIUM  8.5               Intake/Output       18 0700 - 18 0659 18 0700 - 18 0659      [id number] 9850-1840 Total 8253-1532 8575-8134 Total       Intake    P.O.  1240  500 1740  --  -- --    P.O. 7370 632 6218 -- -- --    Total Intake 7399 960 9533 -- -- --       Output    Urine  --  -- --  --  -- --    Number of Times Voided 5 x 6 x 11 x -- -- --    Stool  --  -- --  --  -- --    Number of Times Stooled 1 x 1 x 2 x -- -- --    Total Output -- -- -- -- -- --       Net I/O     0029 408 8072 -- -- --            Intake/Output Summary (Last 24 hours) at 18 0811  Last data filed at 18 0600   Gross per 24 hour   Intake             1740 ml   Output                0 ml   Net             1740 ml            • oxyCODONE immediate-release  5-10 mg Q3HRS PRN   • tizanidine  2-4 mg TID PRN   • acetaminophen  650 mg Q6HRS PRN   • alendronate  70 mg Q7 DAYS   • vitamin D  2,000 Units DAILY   • DULoxetine  60 mg DAILY   • gabapentin  300 mg TID   • liothyronine  5 mcg DAILY   • thyroid  60 mg DAILY   • traZODone  50 mg Nightly   • pentosan  100 mg DAILY   • methenamine hip  1 g DAILY   • Pharmacy Consult Request  1 Each PRN    • MD ALERT...DO NOT ADMINISTER NSAIDS or ASPIRIN unless ORDERED By Neurosurgery  1 Each PRN   • docusate sodium  100 mg BID   • senna-docusate  1 Tab Nightly   • senna-docusate  1 Tab Q24HRS PRN   • polyethylene glycol/lytes  1 Packet BID PRN   • magnesium hydroxide  30 mL QDAY PRN   • bisacodyl  10 mg Q24HRS PRN   • 0.9 % NaCl with KCl 20 mEq 1,000 mL   Continuous   • morphine injection  2 mg Q3HRS PRN   • ondansetron  4 mg Q4HRS PRN   • ondansetron  4 mg Q4HRS PRN   • hydrALAZINE  10 mg Q HOUR PRN   • cloNIDine  0.1 mg Q4HRS PRN         Assessment and Plan:  POD # 4 L4-5 TLIF  Prophylactic anticoagulation: no         Start date/time: tbd  PT/OT - LSO when oob  Pain control -cont oxy IR  Had small bm last night- another dose of relistor today before d/c  Preop UTI - negative urine   Home today   Has fww at home, will order home health   Will send DC scripts through office computer to pharmacy

## 2018-12-07 NOTE — DISCHARGE PLANNING
Agency/Facility Name: [facility]  Spoke To: Li  Outcome: Patient had BM. Li notified of estimated discharge date.

## 2018-12-07 NOTE — PROGRESS NOTES
Discussed with Berkley PHILLIPS pt only had small BM after enema. Order received to give relistor and call her back after 2 hours. Notified Berkley PHILLIPS that pt did not have BM- order received to give suppository and then give enema tonight if still no BM. Pt will stay the night.

## 2018-12-07 NOTE — CARE PLAN
Problem: Safety  Goal: Will remain free from falls  Outcome: PROGRESSING AS EXPECTED  Fall risk assessed using king tabatha scale.  Bed alarm armed, call light within reach, treaded socks on, FWW out of sight/reach.    Problem: Bowel/Gastric:  Goal: Normal bowel function is maintained or improved  Outcome: PROGRESSING SLOWER THAN EXPECTED  Patient had small BM today. Provided warmed prune juice + butter + miralax.   + bowel sounds, + flatus.   Patient requesting enema.     Problem: Pain Management  Goal: Pain level will decrease to patient's comfort goal  Outcome: PROGRESSING AS EXPECTED  Pain assessed using 0-10 pain scale.  PRN Oxy administered per MAR, managing pain well.

## 2018-12-07 NOTE — PROGRESS NOTES
Patient A&Ox4, reporting moderate pain, medicated per MAR, pain management plan established.   Incision to mid back JOEY with dermabond. Well approximated.   No BM since surgery, provided warmed prune juice + butter + miralax, will give enema later if no BM.   No motor drift, strength equal in all extremities, denies numbness and tingling.   POC discussed, no further needs at this time, call light within reach, bed alarm armed, treaded socks on.

## 2018-12-07 NOTE — PROGRESS NOTES
Discharge education provided regarding activity restrictions, medications, incisional care, worsening symptoms, when to call the doctor, and when to seek immediate medical attention. IV previously removed. Discharge home with significant other. Patient escorted off unit with volunteer via wheelchair.

## 2018-12-07 NOTE — PROGRESS NOTES
Patient requesting second enema, instructed we would discuss this with MD during rounds as only one enema was ordered.

## 2018-12-08 NOTE — DISCHARGE SUMMARY
DATE OF ADMISSION:  12/03/2018    DATE OF DISCHARGE:  12/07/2018    ADMITTING DIAGNOSIS:  L4-L5 stenosis and spondylolisthesis recalcitrant to   nonoperative measures.    COURSE OF HOSPITALIZATION:  The patient was admitted to [facility].  On the date of admission, she was brought to the operating   room where she underwent L4-L5 TLIF with Dr. Giancarlo Berman.    Postoperatively, overall the patient has done well.  We have been working on   pain control, mobility, and bowel movements.  She is having some low back   pain, no radicular symptoms.  She has had bowel movements.  Her lower   extremity strength is 5/5 except for right IP of 4/5 secondary to groin pain.    Her incision is clean, dry, and intact with Dermabond.  Her drain has been   removed.  Her vital signs have been stable.  Lab values are stable.  She is   ambulatory with her LSO brace and a walker.  She has a front-wheel walker at   home.  She is requesting home health nursing and physical therapy, which was   ordered yesterday.  She will be discharged to home today.    DISCHARGE MEDICATIONS:  Her prescriptions were sent through our office to her   pharmacy.  1.  Zanaflex 4 mg, take 1 p.o. t.i.d. p.r.n. spasms, #30, no refills.  2.  Percocet 5/325 mg tablets 1 every 6 hours as needed for pain, #40, no   refills.    Patient's substance abuse history has been reviewed through [facility], no   aberrant behaviors.  She is at low risk on her opiate risk assessment tool.    DISCHARGE INSTRUCTIONS:  Patient was given standard postoperative   instructions.  She will follow up with our office at 2 and 6 weeks   postoperatively.  She will wear her LSO brace at all times when out of bed.    No aspirin or anti-inflammatories until cleared.  No driving until cleared.    No lifting greater than 10 pounds.  Any further questions or concerns, she   will contact us.  The patient will be discharged to home today in stable   medical condition with  instructions and medications as outlined above.       ____________________________________     ALAN MCCARTHY / PROMISE    DD:  12/07/2018 09:22:43  DT:  12/07/2018 19:47:53    D#:  [id number]  Job#:  [id number]

## 2019-01-09 ENCOUNTER — HOSPITAL ENCOUNTER (OUTPATIENT)
Dept: RADIOLOGY | Facility: MEDICAL CENTER | Age: 84
End: 2019-01-09
Attending: CLINICAL NURSE SPECIALIST
Payer: MEDICARE

## 2019-01-09 DIAGNOSIS — M54.16 LUMBAR RADICULOPATHY: ICD-10-CM

## 2019-01-09 PROCEDURE — 72100 X-RAY EXAM L-S SPINE 2/3 VWS: CPT

## 2019-03-02 ENCOUNTER — HOSPITAL ENCOUNTER (OUTPATIENT)
Dept: RADIOLOGY | Facility: MEDICAL CENTER | Age: 84
End: 2019-03-02
Attending: FAMILY MEDICINE
Payer: MEDICARE

## 2019-03-02 DIAGNOSIS — Z12.31 VISIT FOR SCREENING MAMMOGRAM: ICD-10-CM

## 2019-03-02 PROCEDURE — 77063 BREAST TOMOSYNTHESIS BI: CPT

## 2019-03-25 ENCOUNTER — TELEPHONE (OUTPATIENT)
Dept: RHEUMATOLOGY | Facility: MEDICAL CENTER | Age: 84
End: 2019-03-25

## 2019-03-25 DIAGNOSIS — M35.3 POLYMYALGIA (HCC): ICD-10-CM

## 2019-03-25 NOTE — TELEPHONE ENCOUNTER
Please notify patient that we ordered an ESR in epic,    Please remind patient that patient cannot have a sore throat or earache or sniffles or bladder infection or any kind of cause of inflammation to assure that the ESR result is correct    If patient does have a cold or sore throat or earache etc. then patient needs to wait for those symptoms to resolve before checking her ESR

## 2019-03-28 ENCOUNTER — HOSPITAL ENCOUNTER (OUTPATIENT)
Facility: MEDICAL CENTER | Age: 84
End: 2019-03-28
Attending: PHYSICIAN ASSISTANT
Payer: MEDICARE

## 2019-03-28 ENCOUNTER — OFFICE VISIT (OUTPATIENT)
Dept: URGENT CARE | Facility: PHYSICIAN GROUP | Age: 84
End: 2019-03-28
Payer: MEDICARE

## 2019-03-28 VITALS
HEIGHT: 67 IN | TEMPERATURE: 97.4 F | OXYGEN SATURATION: 95 % | HEART RATE: 92 BPM | BODY MASS INDEX: 23.07 KG/M2 | SYSTOLIC BLOOD PRESSURE: 102 MMHG | DIASTOLIC BLOOD PRESSURE: 56 MMHG | WEIGHT: 147 LBS | RESPIRATION RATE: 14 BRPM

## 2019-03-28 DIAGNOSIS — R31.9 URINARY TRACT INFECTION WITH HEMATURIA, SITE UNSPECIFIED: ICD-10-CM

## 2019-03-28 DIAGNOSIS — R30.0 DYSURIA: ICD-10-CM

## 2019-03-28 DIAGNOSIS — N39.0 URINARY TRACT INFECTION WITH HEMATURIA, SITE UNSPECIFIED: ICD-10-CM

## 2019-03-28 LAB
APPEARANCE UR: CLEAR
BILIRUB UR STRIP-MCNC: NEGATIVE MG/DL
COLOR UR AUTO: YELLOW
GLUCOSE UR STRIP.AUTO-MCNC: NEGATIVE MG/DL
KETONES UR STRIP.AUTO-MCNC: NEGATIVE MG/DL
LEUKOCYTE ESTERASE UR QL STRIP.AUTO: NORMAL
NITRITE UR QL STRIP.AUTO: POSITIVE
PH UR STRIP.AUTO: 7 [PH] (ref 5–8)
PROT UR QL STRIP: NEGATIVE MG/DL
RBC UR QL AUTO: NORMAL
SP GR UR STRIP.AUTO: 1.02
UROBILINOGEN UR STRIP-MCNC: 0.2 MG/DL

## 2019-03-28 PROCEDURE — 87186 SC STD MICRODIL/AGAR DIL: CPT

## 2019-03-28 PROCEDURE — 87086 URINE CULTURE/COLONY COUNT: CPT

## 2019-03-28 PROCEDURE — 81002 URINALYSIS NONAUTO W/O SCOPE: CPT | Performed by: PHYSICIAN ASSISTANT

## 2019-03-28 PROCEDURE — 99214 OFFICE O/P EST MOD 30 MIN: CPT | Performed by: PHYSICIAN ASSISTANT

## 2019-03-28 PROCEDURE — 87077 CULTURE AEROBIC IDENTIFY: CPT

## 2019-03-28 RX ORDER — CIPROFLOXACIN 500 MG/1
500 TABLET, FILM COATED ORAL EVERY 12 HOURS
Qty: 14 TAB | Refills: 0 | Status: SHIPPED | OUTPATIENT
Start: 2019-03-28 | End: 2019-04-02

## 2019-03-28 ASSESSMENT — ENCOUNTER SYMPTOMS
CHILLS: 0
NAUSEA: 0
ABDOMINAL PAIN: 0
HEADACHES: 0
EYE DISCHARGE: 0
MYALGIAS: 1
SORE THROAT: 0
FEVER: 0
VOMITING: 0
COUGH: 0

## 2019-03-28 NOTE — PROGRESS NOTES
Subjective:      Susan Menendez is a 83 y.o. female who presents with UTI (x3 days, brantley aches, burning urination, hx interstital, head fog)          UTI   This is a new problem. Episode onset: 3 days ago. The problem occurs constantly. The problem has been gradually worsening. Associated symptoms include myalgias and urinary symptoms. Pertinent negatives include no abdominal pain, chest pain, chills, congestion, coughing, fever, headaches, nausea, rash, sore throat or vomiting. She has tried nothing for the symptoms.     Patient reports a history of interstitial cystitis. She was unsure if her symptoms were related to interstitial cystitis or a UTI. She reports increased irritation and burning with urination, as well frequency and urgency. She also reports generalized fatigue/weakness. She denies abdominal pain, flank pain, fever/chills, and nausea/vomiting.       PMH:  has a past medical history of Anemia; Anesthesia; Arthritis (02/06/2018); Autoimmune hepatitis (HCC); Chronic diarrhea; Chronic UTI; Depression; Hiatus hernia syndrome; History of cataract surgery; Hypothyroid; Interstitial cystitis; Other specified disorder of intestines; Pain; Pneumonia; Urinary bladder disorder; and Varicose veins.  MEDS:   Current Outpatient Prescriptions:   •  Testosterone 20 % Cream, by Does not apply route., Disp: , Rfl:   •  thyroid (ARMOUR THYROID) 60 MG Tab, Take 60 mg by mouth every day., Disp: , Rfl:   •  estradiol (ESTRACE) 0.1 MG/GM vaginal cream, Insert 1 g in vagina every evening., Disp: , Rfl:   •  polyethyl glycol-propyl glycol (SYSTANE) 0.4-0.3 % Solution, Place 1 Drop in both eyes 2 Times a Day., Disp: , Rfl:   •  methenamine hip (HIPPREX) 1 GM Tab, Take 1 g by mouth every day., Disp: , Rfl:   •  Cholecalciferol (VITAMIN D) 2000 units Cap, Take 2,000 Units by mouth every day., Disp: , Rfl:   •  Probiotic Product (ALIGN) Cap, Take 1 Cap by mouth every day., Disp: , Rfl:   •  duloxetine (CYMBALTA) 60 MG CPEP,  Take 60 mg by mouth every day., Disp: , Rfl:   •  trazodone (DESYREL) 100 MG TABS, Take 50 mg by mouth every evening., Disp: , Rfl:   •  Multiple Vitamins-Minerals (EYE VITAMINS PO), Take 1 Tab by mouth every day., Disp: , Rfl:   •  LYSINE PO, Take 1 Tab by mouth every day., Disp: , Rfl:   •  Multiple Vitamins-Minerals (BONE SMART PO), Take 1 Tab by mouth every day., Disp: , Rfl:   •  pentosan (ELMIRON) 100 MG CAPS, Take 100 mg by mouth every day., Disp: , Rfl:   •  docusate sodium 100 MG Cap, Take 100 mg by mouth 2 Times a Day., Disp: 60 Cap, Rfl:   •  tizanidine (ZANAFLEX) 4 MG Tab, Take 1 Tab by mouth 3 times a day as needed., Disp: 30 Tab, Rfl: 0  •  alendronate (FOSAMAX) 70 MG Tab, Take 70 mg by mouth every 7 days. On Wed, Disp: , Rfl:   •  liothyronine (CYTOMEL) 5 MCG Tab, Take 5 mcg by mouth every day., Disp: , Rfl:   •  gabapentin (NEURONTIN) 300 MG Cap, Take 300 mg by mouth 3 times a day., Disp: , Rfl:   •  acetaminophen (TYLENOL) 500 MG Tab, Take 1,500 mg by mouth every 6 hours as needed for Moderate Pain., Disp: , Rfl:   ALLERGIES:   Allergies   Allergen Reactions   • Ceftin [Cefuroxime Axetil] Shortness of Breath     weaness    • Amoxicillin      Unknown reaction   • Augmentin Nausea     Stomach ache     • Gluten Meal Unspecified     Weak and tongue breaks out    • Keflex      rash   • Morphine Nausea     extreme   • Pcn [Penicillins] Shortness of Breath   • Sulfa Drugs      Unknown rxn     SURGHX:   Past Surgical History:   Procedure Laterality Date   • LUMBAR FUSION POSTERIOR N/A 12/3/2018    Procedure: L4-5 TLIF;  Surgeon: Giancarlo Berman M.D.;  Location: [facility];  Service: Neurosurgery   • DOREEN BY LAPAROSCOPY  2/12/2018    Procedure: DOREEN BY LAPAROSCOPY;  Surgeon: John H Ganser, M.D.;  Location: [facility];  Service: General   • BLADDER BIOPSY WITH CYSTOSCOPY  6/12/2012    Performed by JARAD GARCIA at [facility]   • OTHER      vein stripping  "both legs   • OTHER      \"breast lumps removed\"   • OTHER ORTHOPEDIC SURGERY      right knee scope   • OTHER ORTHOPEDIC SURGERY      aileen foot surgery     SOCHX:  reports that she quit smoking about 59 years ago. Her smoking use included Cigarettes. She has a 1.00 pack-year smoking history. She has never used smokeless tobacco. She reports that she drinks alcohol. She reports that she does not use drugs.  FH: Family history was reviewed, no pertinent findings to report      Review of Systems   Constitutional: Positive for malaise/fatigue. Negative for chills and fever.   HENT: Negative for congestion, ear pain and sore throat.    Eyes: Negative for discharge.   Respiratory: Negative for cough.    Cardiovascular: Negative for chest pain.   Gastrointestinal: Negative for abdominal pain, nausea and vomiting.   Genitourinary: Positive for dysuria, frequency and urgency.   Musculoskeletal: Positive for myalgias.   Skin: Negative for rash.   Neurological: Negative for headaches.            Objective:     /56 (BP Location: Right arm, Patient Position: Sitting, BP Cuff Size: Adult)   Pulse 92   Temp 36.3 °C (97.4 °F) (Temporal)   Resp 14   Ht 1.702 m (5' 7\")   Wt 66.7 kg (147 lb)   SpO2 95%   BMI 23.02 kg/m²      Physical Exam   Constitutional: She is oriented to person, place, and time. She appears well-developed and well-nourished. No distress.   HENT:   Head: Normocephalic and atraumatic.   Right Ear: External ear normal.   Left Ear: External ear normal.   Nose: Nose normal.   Mouth/Throat: Oropharynx is clear and moist.   Eyes: Conjunctivae and EOM are normal.   Neck: Normal range of motion. Neck supple.   Cardiovascular: Normal rate, regular rhythm and normal heart sounds.    Pulmonary/Chest: Effort normal and breath sounds normal.   Abdominal: Soft. Bowel sounds are normal. There is no tenderness. There is no CVA tenderness.   Musculoskeletal: Normal range of motion. She exhibits no edema.   Neurological: " She is alert and oriented to person, place, and time.   Skin: Skin is warm and dry.          Progress:   POCT Urinalysis:  GLU: Negative  ELIJAH: Negative  KET: Negative  S.025  BLO: Small  PH: 7.0  PRO: Negative  URO: 0.2  NIT: Positive  VIOLETTE: Small    Urine Culture - pending  Will call the patient with the results       Assessment/Plan:     1. Urinary Tract Infection   The patient's presenting symptoms and psychical exam are consistent with an acute urinary tract infection. Her urinalysis was positive today in clinic. The patient is not currently experiencing abdominal pain, flank pain, fever, nausea/vomiting, and the presence of no abdominal tenderness or CVA tenderness on physical exam, it is unlikely her symptoms are due to an acute pyelonephritis. The patient has multiple allergies to medications. Will prescribe her Ciprofloxacin at this time. Will also send her urine for culture. Discussed strict return precautions with the patient and she verbalized understanding   Plan:   Urine Culture - pending  Ciprofloxacin 500mg PO BID x 7 days  OTC Tylenol or Motrin for fever/discomfort.  Drink plenty of fluids  Follow-up with PCP  Return to clinic or go to the ED if symptoms worsen or fail to improve, or patient develops worsening/increasing urinary symptoms, abdominal pain, flank pain, nausea/vomiting, and or fever/chills.    Discussed plan with the patient, and she agrees to the above.

## 2019-03-31 ENCOUNTER — TELEPHONE (OUTPATIENT)
Dept: URGENT CARE | Facility: PHYSICIAN GROUP | Age: 84
End: 2019-03-31

## 2019-03-31 DIAGNOSIS — R31.9 URINARY TRACT INFECTION WITH HEMATURIA, SITE UNSPECIFIED: ICD-10-CM

## 2019-03-31 DIAGNOSIS — N39.0 URINARY TRACT INFECTION WITH HEMATURIA, SITE UNSPECIFIED: ICD-10-CM

## 2019-03-31 NOTE — TELEPHONE ENCOUNTER
Attempted to call the patient regarding the results of her urine culture. The patient's urine culture was positive, and she requires a different antibiotic at this time in order to treat her urinary tract infection. Will try to call patient again.

## 2019-03-31 NOTE — TELEPHONE ENCOUNTER
Attempted to call the patient a second time. Have called her cell phone and her home phone, with no answer. Have also sent the patient a message in [facility] regarding her results.   Will try to call the patient again.

## 2019-04-01 NOTE — TELEPHONE ENCOUNTER
Attempted to call the patient with the results of her urine culture. Her urine culture came back positive, and the patient requires a different antibiotic to treat the infection. Will try to call again later today.

## 2019-04-02 RX ORDER — NITROFURANTOIN 25; 75 MG/1; MG/1
100 CAPSULE ORAL 2 TIMES DAILY
Qty: 10 CAP | Refills: 0 | Status: SHIPPED | OUTPATIENT
Start: 2019-04-02 | End: 2019-04-07

## 2019-04-02 NOTE — TELEPHONE ENCOUNTER
Called the patient with the results of her urine culture. Given the sensitivity report, as well as the patient's known drug allergies, will prescribe Macrobid at this time. The patient's most recent GFR from 12/2018 was >60. Additionally, her calculated creatinine clearance is 64.5. Patient is unsure if she has had a reaction to Macrobid in the past. Instructed to the patient to stop taking the medication and go to the ED if she should develop a reaction to the medication, and she verbalized understanding.

## 2019-04-09 ENCOUNTER — HOSPITAL ENCOUNTER (OUTPATIENT)
Dept: LAB | Facility: MEDICAL CENTER | Age: 84
End: 2019-04-09
Attending: INTERNAL MEDICINE
Payer: MEDICARE

## 2019-04-09 DIAGNOSIS — M35.3 POLYMYALGIA (HCC): ICD-10-CM

## 2019-04-09 LAB — ERYTHROCYTE [SEDIMENTATION RATE] IN BLOOD BY WESTERGREN METHOD: 6 MM/HOUR (ref 0–30)

## 2019-04-09 PROCEDURE — 85652 RBC SED RATE AUTOMATED: CPT

## 2019-04-09 PROCEDURE — 36415 COLL VENOUS BLD VENIPUNCTURE: CPT

## 2019-06-03 ENCOUNTER — HOSPITAL ENCOUNTER (OUTPATIENT)
Dept: RADIOLOGY | Facility: MEDICAL CENTER | Age: 84
End: 2019-06-03
Attending: FAMILY MEDICINE
Payer: MEDICARE

## 2019-06-03 ENCOUNTER — HOSPITAL ENCOUNTER (OUTPATIENT)
Dept: LAB | Facility: MEDICAL CENTER | Age: 84
End: 2019-06-03
Attending: FAMILY MEDICINE
Payer: MEDICARE

## 2019-06-03 DIAGNOSIS — M25.512 BILATERAL SHOULDER PAIN, UNSPECIFIED CHRONICITY: ICD-10-CM

## 2019-06-03 DIAGNOSIS — M25.511 BILATERAL SHOULDER PAIN, UNSPECIFIED CHRONICITY: ICD-10-CM

## 2019-06-03 LAB
BASOPHILS # BLD AUTO: 0.7 % (ref 0–1.8)
BASOPHILS # BLD: 0.03 K/UL (ref 0–0.12)
EOSINOPHIL # BLD AUTO: 0.06 K/UL (ref 0–0.51)
EOSINOPHIL NFR BLD: 1.4 % (ref 0–6.9)
ERYTHROCYTE [DISTWIDTH] IN BLOOD BY AUTOMATED COUNT: 49.6 FL (ref 35.9–50)
ERYTHROCYTE [SEDIMENTATION RATE] IN BLOOD BY WESTERGREN METHOD: 14 MM/HOUR (ref 0–30)
FOLATE SERPL-MCNC: 13 NG/ML
HCT VFR BLD AUTO: 39 % (ref 37–47)
HGB BLD-MCNC: 12.3 G/DL (ref 12–16)
IMM GRANULOCYTES # BLD AUTO: 0.01 K/UL (ref 0–0.11)
IMM GRANULOCYTES NFR BLD AUTO: 0.2 % (ref 0–0.9)
LYMPHOCYTES # BLD AUTO: 1.08 K/UL (ref 1–4.8)
LYMPHOCYTES NFR BLD: 25.4 % (ref 22–41)
MCH RBC QN AUTO: 29.5 PG (ref 27–33)
MCHC RBC AUTO-ENTMCNC: 31.5 G/DL (ref 33.6–35)
MCV RBC AUTO: 93.5 FL (ref 81.4–97.8)
MONOCYTES # BLD AUTO: 0.41 K/UL (ref 0–0.85)
MONOCYTES NFR BLD AUTO: 9.6 % (ref 0–13.4)
NEUTROPHILS # BLD AUTO: 2.66 K/UL (ref 2–7.15)
NEUTROPHILS NFR BLD: 62.7 % (ref 44–72)
NRBC # BLD AUTO: 0 K/UL
NRBC BLD-RTO: 0 /100 WBC
PLATELET # BLD AUTO: 299 K/UL (ref 164–446)
PMV BLD AUTO: 10.4 FL (ref 9–12.9)
RBC # BLD AUTO: 4.17 M/UL (ref 4.2–5.4)
T3 SERPL-MCNC: 150.4 NG/DL (ref 60–181)
T4 SERPL-MCNC: 5 UG/DL (ref 4–12)
TSH SERPL DL<=0.005 MIU/L-ACNC: 4.61 UIU/ML (ref 0.38–5.33)
VIT B12 SERPL-MCNC: 591 PG/ML (ref 211–911)
WBC # BLD AUTO: 4.3 K/UL (ref 4.8–10.8)

## 2019-06-03 PROCEDURE — 73030 X-RAY EXAM OF SHOULDER: CPT | Mod: RT

## 2019-06-03 PROCEDURE — 36415 COLL VENOUS BLD VENIPUNCTURE: CPT

## 2019-06-03 PROCEDURE — 82607 VITAMIN B-12: CPT

## 2019-06-03 PROCEDURE — 85652 RBC SED RATE AUTOMATED: CPT

## 2019-06-03 PROCEDURE — 85025 COMPLETE CBC W/AUTO DIFF WBC: CPT

## 2019-06-03 PROCEDURE — 84436 ASSAY OF TOTAL THYROXINE: CPT

## 2019-06-03 PROCEDURE — 84480 ASSAY TRIIODOTHYRONINE (T3): CPT

## 2019-06-03 PROCEDURE — 82746 ASSAY OF FOLIC ACID SERUM: CPT

## 2019-06-03 PROCEDURE — 84443 ASSAY THYROID STIM HORMONE: CPT

## 2019-06-14 ENCOUNTER — OFFICE VISIT (OUTPATIENT)
Dept: URGENT CARE | Facility: PHYSICIAN GROUP | Age: 84
End: 2019-06-14
Payer: MEDICARE

## 2019-06-14 ENCOUNTER — HOSPITAL ENCOUNTER (OUTPATIENT)
Facility: MEDICAL CENTER | Age: 84
End: 2019-06-14
Attending: FAMILY MEDICINE
Payer: MEDICARE

## 2019-06-14 VITALS
BODY MASS INDEX: 22.44 KG/M2 | RESPIRATION RATE: 16 BRPM | DIASTOLIC BLOOD PRESSURE: 78 MMHG | WEIGHT: 143 LBS | HEIGHT: 67 IN | OXYGEN SATURATION: 96 % | TEMPERATURE: 99.8 F | SYSTOLIC BLOOD PRESSURE: 132 MMHG | HEART RATE: 78 BPM

## 2019-06-14 DIAGNOSIS — N30.00 ACUTE CYSTITIS WITHOUT HEMATURIA: ICD-10-CM

## 2019-06-14 LAB
APPEARANCE UR: CLEAR
BILIRUB UR STRIP-MCNC: NORMAL MG/DL
COLOR UR AUTO: YELLOW
GLUCOSE UR STRIP.AUTO-MCNC: NORMAL MG/DL
KETONES UR STRIP.AUTO-MCNC: NORMAL MG/DL
LEUKOCYTE ESTERASE UR QL STRIP.AUTO: NORMAL
NITRITE UR QL STRIP.AUTO: NORMAL
PH UR STRIP.AUTO: 7 [PH] (ref 5–8)
PROT UR QL STRIP: NORMAL MG/DL
RBC UR QL AUTO: NORMAL
SP GR UR STRIP.AUTO: 1.01
UROBILINOGEN UR STRIP-MCNC: 0.2 MG/DL

## 2019-06-14 PROCEDURE — 99213 OFFICE O/P EST LOW 20 MIN: CPT | Performed by: FAMILY MEDICINE

## 2019-06-14 PROCEDURE — 81002 URINALYSIS NONAUTO W/O SCOPE: CPT | Performed by: FAMILY MEDICINE

## 2019-06-14 PROCEDURE — 87086 URINE CULTURE/COLONY COUNT: CPT

## 2019-06-14 RX ORDER — MELOXICAM 5 MG/1
CAPSULE ORAL
COMMUNITY
End: 2019-08-14 | Stop reason: SDUPTHER

## 2019-06-14 RX ORDER — CIPROFLOXACIN 500 MG/1
500 TABLET, FILM COATED ORAL 2 TIMES DAILY
Qty: 10 TAB | Refills: 0 | Status: SHIPPED | OUTPATIENT
Start: 2019-06-14 | End: 2021-06-10

## 2019-06-14 ASSESSMENT — ENCOUNTER SYMPTOMS
NAUSEA: 0
SORE THROAT: 0
HEADACHES: 0
DIARRHEA: 0
VOMITING: 0
ABDOMINAL PAIN: 0
SHORTNESS OF BREATH: 0
FEVER: 0

## 2019-06-14 NOTE — PROGRESS NOTES
Subjective:     Susan Menendez is a 83 y.o. female who presents for Painful Urination    HPI  Pt presents for possible UTI  Patient with dysuria for the past 3 days  Took 2 tablets of ciprofloxacin left over from an old prescription  Comes in requesting more antibiotics  Dysuria is every void and has some burning between voids  Feeling very fatigued  No cloudiness, hematuria, flank pain  No abdominal pain, diarrhea, vomiting  Has history of recurrent UTIs which feels similar to this    Review of Systems   Constitutional: Negative for fever.   HENT: Negative for sore throat.    Respiratory: Negative for shortness of breath.    Cardiovascular: Negative for chest pain.   Gastrointestinal: Negative for abdominal pain, diarrhea, nausea and vomiting.   Genitourinary: Positive for dysuria.   Skin: Negative for rash.   Neurological: Negative for headaches.       PMH:  has a past medical history of Anemia; Anesthesia; Arthritis (02/06/2018); Autoimmune hepatitis (HCC); Chronic diarrhea; Chronic UTI; Depression; Hiatus hernia syndrome; History of cataract surgery; Hypothyroid; Interstitial cystitis; Other specified disorder of intestines; Pain; Pneumonia; Urinary bladder disorder; and Varicose veins.  MEDS:   Current Outpatient Prescriptions:   •  Meloxicam 5 MG Cap, Take  by mouth., Disp: , Rfl:   •  Testosterone 20 % Cream, by Does not apply route., Disp: , Rfl:   •  docusate sodium 100 MG Cap, Take 100 mg by mouth 2 Times a Day., Disp: 60 Cap, Rfl:   •  thyroid (ARMOUR THYROID) 60 MG Tab, Take 60 mg by mouth every day., Disp: , Rfl:   •  estradiol (ESTRACE) 0.1 MG/GM vaginal cream, Insert 1 g in vagina every evening., Disp: , Rfl:   •  methenamine hip (HIPPREX) 1 GM Tab, Take 1 g by mouth every day., Disp: , Rfl:   •  Cholecalciferol (VITAMIN D) 2000 units Cap, Take 2,000 Units by mouth every day., Disp: , Rfl:   •  Probiotic Product (ALIGN) Cap, Take 1 Cap by mouth every day., Disp: , Rfl:   •  duloxetine (CYMBALTA)  60 MG CPEP, Take 60 mg by mouth every day., Disp: , Rfl:   •  trazodone (DESYREL) 100 MG TABS, Take 50 mg by mouth every evening., Disp: , Rfl:   •  LYSINE PO, Take 1 Tab by mouth every day., Disp: , Rfl:   •  pentosan (ELMIRON) 100 MG CAPS, Take 100 mg by mouth every day., Disp: , Rfl:   •  tizanidine (ZANAFLEX) 4 MG Tab, Take 1 Tab by mouth 3 times a day as needed. (Patient not taking: Reported on 6/14/2019), Disp: 30 Tab, Rfl: 0  •  alendronate (FOSAMAX) 70 MG Tab, Take 70 mg by mouth every 7 days. On Wed, Disp: , Rfl:   •  polyethyl glycol-propyl glycol (SYSTANE) 0.4-0.3 % Solution, Place 1 Drop in both eyes 2 Times a Day., Disp: , Rfl:   •  liothyronine (CYTOMEL) 5 MCG Tab, Take 5 mcg by mouth every day., Disp: , Rfl:   •  gabapentin (NEURONTIN) 300 MG Cap, Take 300 mg by mouth 3 times a day., Disp: , Rfl:   •  acetaminophen (TYLENOL) 500 MG Tab, Take 1,500 mg by mouth every 6 hours as needed for Moderate Pain., Disp: , Rfl:   •  Multiple Vitamins-Minerals (EYE VITAMINS PO), Take 1 Tab by mouth every day., Disp: , Rfl:   •  Multiple Vitamins-Minerals (BONE SMART PO), Take 1 Tab by mouth every day., Disp: , Rfl:   ALLERGIES:   Allergies   Allergen Reactions   • Ceftin [Cefuroxime Axetil] Shortness of Breath     weaness    • Amoxicillin      Unknown reaction   • Augmentin Nausea     Stomach ache     • Gluten Meal Unspecified     Weak and tongue breaks out    • Keflex      rash   • Morphine Nausea     extreme   • Pcn [Penicillins] Shortness of Breath   • Sulfa Drugs      Unknown rxn     SURGHX:   Past Surgical History:   Procedure Laterality Date   • LUMBAR FUSION POSTERIOR N/A 12/3/2018    Procedure: L4-5 TLIF;  Surgeon: Giancarlo Berman M.D.;  Location: [facility];  Service: Neurosurgery   • DOREEN BY LAPAROSCOPY  2/12/2018    Procedure: DOREEN BY LAPAROSCOPY;  Surgeon: John H Ganser, M.D.;  Location: [facility];  Service: General   • BLADDER BIOPSY WITH CYSTOSCOPY  6/12/2012     "Performed by JARAD GARCIA at SUR[facility]ERY [facility]   • OTHER      vein stripping both legs   • OTHER      \"breast lumps removed\"   • OTHER ORTHOPEDIC SURGERY      right knee scope   • OTHER ORTHOPEDIC SURGERY      aileen foot surgery     SOCHX:  reports that she quit smoking about 59 years ago. Her smoking use included Cigarettes. She has a 1.00 pack-year smoking history. She has never used smokeless tobacco. She reports that she drinks alcohol. She reports that she does not use drugs.  FH: Family history was reviewed, not contributing to acute illness     Objective:   /78 (BP Location: Left arm, Patient Position: Sitting, BP Cuff Size: Adult)   Pulse 78   Temp 37.7 °C (99.8 °F) (Temporal)   Resp 16   Ht 1.702 m (5' 7\")   Wt 64.9 kg (143 lb)   SpO2 96%   BMI 22.40 kg/m²     Physical Exam   Constitutional: She appears well-developed and well-nourished. No distress.   HENT:   Head: Normocephalic and atraumatic.   Eyes: Conjunctivae and EOM are normal. Right eye exhibits no discharge. Left eye exhibits no discharge. No scleral icterus.   Neck: Normal range of motion. No tracheal deviation present.   Abdominal: Soft. Bowel sounds are normal. She exhibits no distension and no mass. There is no tenderness. There is no rebound and no guarding.   No CVA tenderness   Neurological: She is alert. Coordination normal.   Skin: Skin is warm and dry. No rash noted. She is not diaphoretic.   Psychiatric: She has a normal mood and affect.       Assessment/Plan:   Assessment    1. Acute cystitis without hematuria  Patient is an 83-year-old female with recurrent urinary tract infections.  Point-of-care urine in office today essentially negative, however patient has already started antibiotics which is likely the reason for her urine appearance.  Has history of recurrent UTIs which feels similar and will empirically treat with Cipro.  Send urine for culture and follow culture results.  Patient will follow-up as " needed.  - POCT Urinalysis [[id number]]  - Urine Culture [[id number]]; Future  - ciprofloxacin (CIPRO) 500 MG Tab; Take 1 Tab by mouth 2 times a day.  Dispense: 10 Tab; Refill: 0

## 2019-06-17 LAB
BACTERIA UR CULT: NORMAL
SIGNIFICANT IND 70042: NORMAL
SITE SITE: NORMAL
SOURCE SOURCE: NORMAL

## 2019-07-03 ENCOUNTER — HOSPITAL ENCOUNTER (OUTPATIENT)
Dept: RADIOLOGY | Facility: MEDICAL CENTER | Age: 84
End: 2019-07-03
Attending: PHYSICAL MEDICINE & REHABILITATION
Payer: MEDICARE

## 2019-07-03 DIAGNOSIS — M25.512 LEFT SHOULDER PAIN, UNSPECIFIED CHRONICITY: ICD-10-CM

## 2019-07-03 PROCEDURE — 73030 X-RAY EXAM OF SHOULDER: CPT | Mod: LT

## 2019-07-25 ENCOUNTER — HOSPITAL ENCOUNTER (OUTPATIENT)
Dept: LAB | Facility: MEDICAL CENTER | Age: 84
End: 2019-07-25
Attending: FAMILY MEDICINE
Payer: MEDICARE

## 2019-07-25 LAB
T3 SERPL-MCNC: 110.7 NG/DL (ref 60–181)
T4 SERPL-MCNC: 5.7 UG/DL (ref 4–12)
TSH SERPL DL<=0.005 MIU/L-ACNC: 5.54 UIU/ML (ref 0.38–5.33)

## 2019-07-25 PROCEDURE — 84436 ASSAY OF TOTAL THYROXINE: CPT

## 2019-07-25 PROCEDURE — 36415 COLL VENOUS BLD VENIPUNCTURE: CPT

## 2019-07-25 PROCEDURE — 84480 ASSAY TRIIODOTHYRONINE (T3): CPT

## 2019-07-25 PROCEDURE — 84443 ASSAY THYROID STIM HORMONE: CPT

## 2019-08-11 ENCOUNTER — OFFICE VISIT (OUTPATIENT)
Dept: URGENT CARE | Facility: PHYSICIAN GROUP | Age: 84
End: 2019-08-11
Payer: MEDICARE

## 2019-08-11 ENCOUNTER — HOSPITAL ENCOUNTER (OUTPATIENT)
Facility: MEDICAL CENTER | Age: 84
End: 2019-08-11
Attending: NURSE PRACTITIONER
Payer: MEDICARE

## 2019-08-11 VITALS
OXYGEN SATURATION: 98 % | RESPIRATION RATE: 16 BRPM | WEIGHT: 143 LBS | DIASTOLIC BLOOD PRESSURE: 78 MMHG | SYSTOLIC BLOOD PRESSURE: 128 MMHG | HEIGHT: 67 IN | TEMPERATURE: 97 F | BODY MASS INDEX: 22.44 KG/M2 | HEART RATE: 97 BPM

## 2019-08-11 DIAGNOSIS — N30.01 ACUTE CYSTITIS WITH HEMATURIA: ICD-10-CM

## 2019-08-11 DIAGNOSIS — R30.0 DYSURIA: ICD-10-CM

## 2019-08-11 LAB
APPEARANCE UR: NORMAL
BILIRUB UR STRIP-MCNC: NEGATIVE MG/DL
COLOR UR AUTO: NORMAL
GLUCOSE UR STRIP.AUTO-MCNC: NEGATIVE MG/DL
KETONES UR STRIP.AUTO-MCNC: NEGATIVE MG/DL
LEUKOCYTE ESTERASE UR QL STRIP.AUTO: NORMAL
NITRITE UR QL STRIP.AUTO: NORMAL
PH UR STRIP.AUTO: 5.5 [PH] (ref 5–8)
PROT UR QL STRIP: NEGATIVE MG/DL
RBC UR QL AUTO: NORMAL
SP GR UR STRIP.AUTO: 1.01
UROBILINOGEN UR STRIP-MCNC: 0.2 MG/DL

## 2019-08-11 PROCEDURE — 99214 OFFICE O/P EST MOD 30 MIN: CPT | Performed by: NURSE PRACTITIONER

## 2019-08-11 PROCEDURE — 87077 CULTURE AEROBIC IDENTIFY: CPT

## 2019-08-11 PROCEDURE — 81002 URINALYSIS NONAUTO W/O SCOPE: CPT | Performed by: NURSE PRACTITIONER

## 2019-08-11 PROCEDURE — 87086 URINE CULTURE/COLONY COUNT: CPT

## 2019-08-11 PROCEDURE — 87186 SC STD MICRODIL/AGAR DIL: CPT

## 2019-08-11 RX ORDER — CIPROFLOXACIN 500 MG/1
500 TABLET, FILM COATED ORAL EVERY 12 HOURS
Qty: 14 TAB | Refills: 0 | Status: SHIPPED | OUTPATIENT
Start: 2019-08-11 | End: 2019-08-18

## 2019-08-11 ASSESSMENT — ENCOUNTER SYMPTOMS
BACK PAIN: 0
ABDOMINAL PAIN: 0
VOMITING: 0
NAUSEA: 0
MYALGIAS: 0
DIZZINESS: 0
WEAKNESS: 0
CONSTIPATION: 0
DIARRHEA: 0
CHILLS: 0
HEADACHES: 0
FEVER: 0
FLANK PAIN: 0

## 2019-08-11 NOTE — PROGRESS NOTES
Subjective:      Susan Menendez is a 83 y.o. female who presents with Urinary Pain (Dark/cloudy urine x4days )            HPI  Dysuria, cloudy urine x 4 days. Urgency, frequency. Denies fever, n/v, back pain but has mild low pelvic pressure. H/o fibromyalgia. H/o recurrent UTIs.    PMH:  has a past medical history of Anemia, Anesthesia, Arthritis (02/06/2018), Autoimmune hepatitis (HCC), Chronic diarrhea, Chronic UTI, Depression, Hiatus hernia syndrome, History of cataract surgery, Hypothyroid, Interstitial cystitis, Other specified disorder of intestines, Pain, Pneumonia, Urinary bladder disorder, and Varicose veins.  MEDS:   Current Outpatient Medications:   •  ciprofloxacin (CIPRO) 500 MG Tab, Take 1 Tab by mouth every 12 hours for 7 days., Disp: 14 Tab, Rfl: 0  •  Meloxicam 5 MG Cap, Take  by mouth., Disp: , Rfl:   •  ciprofloxacin (CIPRO) 500 MG Tab, Take 1 Tab by mouth 2 times a day., Disp: 10 Tab, Rfl: 0  •  Testosterone 20 % Cream, by Does not apply route., Disp: , Rfl:   •  docusate sodium 100 MG Cap, Take 100 mg by mouth 2 Times a Day., Disp: 60 Cap, Rfl:   •  tizanidine (ZANAFLEX) 4 MG Tab, Take 1 Tab by mouth 3 times a day as needed. (Patient not taking: Reported on 6/14/2019), Disp: 30 Tab, Rfl: 0  •  thyroid (ARMOUR THYROID) 60 MG Tab, Take 60 mg by mouth every day., Disp: , Rfl:   •  estradiol (ESTRACE) 0.1 MG/GM vaginal cream, Insert 1 g in vagina every evening., Disp: , Rfl:   •  alendronate (FOSAMAX) 70 MG Tab, Take 70 mg by mouth every 7 days. On Wed, Disp: , Rfl:   •  polyethyl glycol-propyl glycol (SYSTANE) 0.4-0.3 % Solution, Place 1 Drop in both eyes 2 Times a Day., Disp: , Rfl:   •  methenamine hip (HIPPREX) 1 GM Tab, Take 1 g by mouth every day., Disp: , Rfl:   •  liothyronine (CYTOMEL) 5 MCG Tab, Take 5 mcg by mouth every day., Disp: , Rfl:   •  Cholecalciferol (VITAMIN D) 2000 units Cap, Take 2,000 Units by mouth every day., Disp: , Rfl:   •  gabapentin (NEURONTIN) 300 MG Cap, Take  "300 mg by mouth 3 times a day., Disp: , Rfl:   •  acetaminophen (TYLENOL) 500 MG Tab, Take 1,500 mg by mouth every 6 hours as needed for Moderate Pain., Disp: , Rfl:   •  Probiotic Product (ALIGN) Cap, Take 1 Cap by mouth every day., Disp: , Rfl:   •  duloxetine (CYMBALTA) 60 MG CPEP, Take 60 mg by mouth every day., Disp: , Rfl:   •  trazodone (DESYREL) 100 MG TABS, Take 50 mg by mouth every evening., Disp: , Rfl:   •  Multiple Vitamins-Minerals (EYE VITAMINS PO), Take 1 Tab by mouth every day., Disp: , Rfl:   •  LYSINE PO, Take 1 Tab by mouth every day., Disp: , Rfl:   •  Multiple Vitamins-Minerals (BONE SMART PO), Take 1 Tab by mouth every day., Disp: , Rfl:   •  pentosan (ELMIRON) 100 MG CAPS, Take 100 mg by mouth every day., Disp: , Rfl:   ALLERGIES:   Allergies   Allergen Reactions   • Ceftin [Cefuroxime Axetil] Shortness of Breath     weaness    • Amoxicillin      Unknown reaction   • Augmentin Nausea     Stomach ache     • Gluten Meal Unspecified     Weak and tongue breaks out    • Keflex      rash   • Morphine Nausea     extreme   • Pcn [Penicillins] Shortness of Breath   • Sulfa Drugs      Unknown rxn     SURGHX:   Past Surgical History:   Procedure Laterality Date   • LUMBAR FUSION POSTERIOR N/A 12/3/2018    Procedure: L4-5 TLIF;  Surgeon: Giancarlo Berman M.D.;  Location: [facility];  Service: Neurosurgery   • DOREEN BY LAPAROSCOPY  2/12/2018    Procedure: DOREEN BY LAPAROSCOPY;  Surgeon: John H Ganser, M.D.;  Location: [facility];  Service: General   • BLADDER BIOPSY WITH CYSTOSCOPY  6/12/2012    Performed by JARAD GARCIA at [facility]   • OTHER      vein stripping both legs   • OTHER      \"breast lumps removed\"   • OTHER ORTHOPEDIC SURGERY      right knee scope   • OTHER ORTHOPEDIC SURGERY      aileen foot surgery     SOCHX:  reports that she quit smoking about 59 years ago. Her smoking use included cigarettes. She has a 1.00 pack-year smoking history. She " "has never used smokeless tobacco. She reports that she drinks alcohol. She reports that she does not use drugs.  FH: Family history was reviewed, no pertinent findings to report    Review of Systems   Constitutional: Negative for chills, fever and malaise/fatigue.   Gastrointestinal: Negative for abdominal pain, constipation, diarrhea, nausea and vomiting.   Genitourinary: Positive for dysuria, frequency and urgency. Negative for flank pain and hematuria.   Musculoskeletal: Negative for back pain and myalgias.   Skin: Negative for itching and rash.   Neurological: Negative for dizziness, weakness and headaches.   All other systems reviewed and are negative.         Objective:     /78   Pulse 97   Temp 36.1 °C (97 °F) (Temporal)   Resp 16   Ht 1.702 m (5' 7\")   Wt 64.9 kg (143 lb)   SpO2 98%   BMI 22.40 kg/m²      Physical Exam   Constitutional: She is oriented to person, place, and time. Vital signs are normal. She appears well-developed and well-nourished. She is active and cooperative.  Non-toxic appearance. She does not have a sickly appearance. She does not appear ill. No distress.   HENT:   Head: Normocephalic.   Eyes: Pupils are equal, round, and reactive to light. Conjunctivae and EOM are normal.   Neck: Normal range of motion. Neck supple.   Cardiovascular: Normal rate and regular rhythm.   Pulmonary/Chest: Effort normal.   Abdominal: Soft. Bowel sounds are normal. She exhibits no distension. There is no tenderness. There is no rigidity, no rebound, no guarding and no CVA tenderness.   Musculoskeletal: Normal range of motion.   Neurological: She is alert and oriented to person, place, and time.   Skin: Skin is warm and dry. She is not diaphoretic.   Vitals reviewed.              Assessment/Plan:     1. Dysuria    - POCT Urinalysis    2. Acute cystitis with hematuria    - ciprofloxacin (CIPRO) 500 MG Tab; Take 1 Tab by mouth every 12 hours for 7 days.  Dispense: 14 Tab; Refill: 0  - Urine " Culture; Future    Increase water intake  Urinate more frequently and empty bladder completely  Practice good toileting hygiene after bowel movements and sexual intercourse, refrain from sexual intercourse until infection cleared  Monitor for back/flank pain, difficulty urinating, blood in urine- need re-evaluation    MyChart release

## 2019-08-12 ENCOUNTER — TELEPHONE (OUTPATIENT)
Dept: RHEUMATOLOGY | Facility: MEDICAL CENTER | Age: 84
End: 2019-08-12

## 2019-08-12 NOTE — TELEPHONE ENCOUNTER
Pt called and  would like to have some lab work done for her PMR. She didn't state if she was having a flare just asked to have some labs drawn. Please advise and thank you      Pt has a follow up with you on Wed. I told the pt just to come in for her appt, as its almost been a year since she has been seen and you would order the labs at her upcomming appt. Thank you.

## 2019-08-14 ENCOUNTER — OFFICE VISIT (OUTPATIENT)
Dept: RHEUMATOLOGY | Facility: MEDICAL CENTER | Age: 84
End: 2019-08-14
Payer: MEDICARE

## 2019-08-14 VITALS
SYSTOLIC BLOOD PRESSURE: 130 MMHG | RESPIRATION RATE: 14 BRPM | HEART RATE: 98 BPM | DIASTOLIC BLOOD PRESSURE: 60 MMHG | OXYGEN SATURATION: 97 % | BODY MASS INDEX: 20.79 KG/M2 | WEIGHT: 132.72 LBS | TEMPERATURE: 98.5 F

## 2019-08-14 DIAGNOSIS — M35.3 POLYMYALGIA (HCC): ICD-10-CM

## 2019-08-14 DIAGNOSIS — Z79.1 NSAID LONG-TERM USE: ICD-10-CM

## 2019-08-14 DIAGNOSIS — G89.29 CHRONIC RIGHT SHOULDER PAIN: ICD-10-CM

## 2019-08-14 DIAGNOSIS — M81.0 SENILE OSTEOPOROSIS: ICD-10-CM

## 2019-08-14 DIAGNOSIS — M15.9 PRIMARY OSTEOARTHRITIS INVOLVING MULTIPLE JOINTS: ICD-10-CM

## 2019-08-14 DIAGNOSIS — M25.511 CHRONIC RIGHT SHOULDER PAIN: ICD-10-CM

## 2019-08-14 DIAGNOSIS — Z79.83 LONG TERM USE OF ALENDRONATE THERAPY (FOSAMAX): ICD-10-CM

## 2019-08-14 DIAGNOSIS — E03.9 HYPOTHYROIDISM, UNSPECIFIED TYPE: ICD-10-CM

## 2019-08-14 PROCEDURE — 20610 DRAIN/INJ JOINT/BURSA W/O US: CPT | Mod: RT | Performed by: INTERNAL MEDICINE

## 2019-08-14 PROCEDURE — 99214 OFFICE O/P EST MOD 30 MIN: CPT | Mod: 25 | Performed by: INTERNAL MEDICINE

## 2019-08-14 RX ORDER — METHYLPREDNISOLONE ACETATE 40 MG/ML
20 INJECTION, SUSPENSION INTRA-ARTICULAR; INTRALESIONAL; INTRAMUSCULAR; SOFT TISSUE ONCE
Status: COMPLETED | OUTPATIENT
Start: 2019-08-14 | End: 2019-08-14

## 2019-08-14 RX ORDER — MELOXICAM 5 MG/1
5 CAPSULE ORAL DAILY
Qty: 90 CAP | Refills: 0 | Status: SHIPPED | OUTPATIENT
Start: 2019-08-14 | End: 2019-08-27

## 2019-08-14 RX ORDER — ALENDRONATE SODIUM 70 MG/1
70 TABLET ORAL
Qty: 12 TAB | Refills: 1 | Status: SHIPPED | OUTPATIENT
Start: 2019-08-14 | End: 2020-02-25 | Stop reason: SDUPTHER

## 2019-08-14 RX ADMIN — METHYLPREDNISOLONE ACETATE 20 MG: 40 INJECTION, SUSPENSION INTRA-ARTICULAR; INTRALESIONAL; INTRAMUSCULAR; SOFT TISSUE at 10:19

## 2019-08-14 NOTE — PROGRESS NOTES
Chief Complaint- joint pain     Subjective:   Susan Menendez is a 83 y.o. female here today for follow up of rheumatological issues    This is a follow-up visit for this patient who is seen in this clinic predominantly for DJD and osteopenia/osteoporosis.  Patient does have a remote history of PMR which has been completely resolved with normal sedimentation rates for quite some time.  Patient denies any jaw claudication, denies any amaurosis fugax, denies any fevers of unknown etiology, denies any unexplained weight loss.  Patient however is here today complaining of bilateral shoulder pain, bilateral hip pain knee pain and low back pain.       Since last visit patient status post low back surgery with benefit in December 2019.      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  Additional comorbidities include a diagnosis of autoimmune hepatitis and interstitial cystitis.      Uric acid 3.4 2/2013  RF neg 2/2013  CCP neg 2/2015  MARLEE neg 2/2015  DEXA 10/2014 T scores -2.3, -1.2  DEXA 11/8/2016 T scores -0.8, -2.3    FRAX 11/8/2016 major osteoporotic fracture risk is 23.8%, hip fracture risk 8.4%  DEXA 3/7/2018 T scores -0.6, -2.1  FRAX 3/7/2018 major osteoporotic fracture risk 26.1%, hip fracture risk 9.5%  Patient has been on Fosamax since March 2018  MRI LS spine 10/2016-indicates DJD and mild to moderate neural foraminal narrowing   Left shoulder x-rays 7/2019-   Impression       1.  Mild degenerative change of LEFT shoulder.  2.  No fracture or dislocation.     Right shoulder x-rays 7/2019 -  Impression       1.  Moderate right acromioclavicular osteoarthrosis.  2.  Mild glenohumeral osteoarthrosis.  3.  No acute fracture or dislocation.             Current medicines (including changes today)  Current Outpatient Medications   Medication Sig Dispense  Refill   • alendronate (FOSAMAX) 70 MG Tab Take 1 Tab by mouth every 7 days. On Wed 12 Tab 1   • Meloxicam 5 MG Cap Take 5 mg by mouth every day. 90 Cap 0   • ciprofloxacin (CIPRO) 500 MG Tab Take 1 Tab by mouth every 12 hours for 7 days. 14 Tab 0   • ciprofloxacin (CIPRO) 500 MG Tab Take 1 Tab by mouth 2 times a day. 10 Tab 0   • Testosterone 20 % Cream by Does not apply route.     • docusate sodium 100 MG Cap Take 100 mg by mouth 2 Times a Day. 60 Cap    • thyroid (ARMOUR THYROID) 60 MG Tab Take 60 mg by mouth every day.     • estradiol (ESTRACE) 0.1 MG/GM vaginal cream Insert 1 g in vagina every evening.     • polyethyl glycol-propyl glycol (SYSTANE) 0.4-0.3 % Solution Place 1 Drop in both eyes 2 Times a Day.     • methenamine hip (HIPPREX) 1 GM Tab Take 1 g by mouth every day.     • liothyronine (CYTOMEL) 5 MCG Tab Take 5 mcg by mouth every day.     • Cholecalciferol (VITAMIN D) 2000 units Cap Take 2,000 Units by mouth every day.     • acetaminophen (TYLENOL) 500 MG Tab Take 1,500 mg by mouth every 6 hours as needed for Moderate Pain.     • Probiotic Product (ALIGN) Cap Take 1 Cap by mouth every day.     • duloxetine (CYMBALTA) 60 MG CPEP Take 120 mg by mouth every day.     • trazodone (DESYREL) 100 MG TABS Take 50 mg by mouth every evening.     • Multiple Vitamins-Minerals (EYE VITAMINS PO) Take 1 Tab by mouth every day.     • LYSINE PO Take 1 Tab by mouth every day.     • Multiple Vitamins-Minerals (BONE SMART PO) Take 1 Tab by mouth every day.     • pentosan (ELMIRON) 100 MG CAPS Take 100 mg by mouth every day.     • tizanidine (ZANAFLEX) 4 MG Tab Take 1 Tab by mouth 3 times a day as needed. (Patient not taking: Reported on 6/14/2019) 30 Tab 0   • gabapentin (NEURONTIN) 300 MG Cap Take 300 mg by mouth 3 times a day.       No current facility-administered medications for this visit.      She  has a past medical history of Anemia, Anesthesia, Arthritis (02/06/2018), Autoimmune hepatitis (HCC), Chronic diarrhea,  Chronic UTI, Depression, Hiatus hernia syndrome, History of cataract surgery, Hypothyroid, Interstitial cystitis, Other specified disorder of intestines, Pain, Pneumonia, Urinary bladder disorder, and Varicose veins.    ROS   Other than what is mentioned in HPI or physical exam, there is no history of headaches, double vision or blurred vision. No temporal tenderness or jaw claudication. No history of cataracts or glaucoma. No trouble swallowing difficulties or sore throats.  No chest complaints including chest pain, cough or sputum production. No GI complaints including nausea, vomiting, change in bowel habits, or past peptic ulcer disease. No history of blood in the stools. No urinary complaints including dysuria or frequency. No history of alopecia, photosensitivity, oral ulcerations, Raynaud's phenomena.       Objective:     /60   Pulse 98   Temp 36.9 °C (98.5 °F) (Temporal)   Resp 14   Wt 60.2 kg (132 lb 11.5 oz)   SpO2 97%  Body mass index is 20.79 kg/m².   Physical Exam:    Constitutional: Alert and oriented X3, patient is talkative with good eye contact.Skin: Warm, dry, good turgor, no rashes in visible areas.Eye: Equal, round and reactive, conjunctiva clear, lids normal EOM intactENMT: Lips without lesions, good dentition, no oropharyngeal ulcers, moist buccal mucosa, pinna without deformityNeck: Trachea midline, no masses, no thyromegaly.Lymph:  No cervical lymphadenopathy, no axillary lymphadenopathy, no supraclavicular lymphadenopathyRespiratory: Unlabored respiratory effort, lungs clear to auscultation, no wheezes, no ronchi.Cardiovascular: Normal S1, S2, no murmur, no edema.Abdomen: Soft, non-tender, no masses, no hepatosplenomegaly.Psych: Alert and oriented x3, normal affect and mood.Neuro: Cranial nerves 2-12 are grossly intact, no loss of sensation LEExt:no joint laxity noted in bilateral arms, no joint laxity noted in bilateral legs, small joints of the hands indicate some Heberden's  nodes on the DIP joints but no swan-neck or boutonniere deformities, crepitus in the knees but no effusions, there is poor muscular architecture of both shoulders, query slight effusion right shoulder,    Lab Results   Component Value Date/Time    SODIUM 132 (L) 12/05/2018 02:49 AM    POTASSIUM 4.0 12/05/2018 02:49 AM    CHLORIDE 99 12/05/2018 02:49 AM    CO2 26 12/05/2018 02:49 AM    GLUCOSE 99 12/05/2018 02:49 AM    BUN 17 12/05/2018 02:49 AM    CREATININE 0.72 12/05/2018 02:49 AM    CREATININE 0.9 01/04/2005 12:05 PM      Lab Results   Component Value Date/Time    WBC 4.3 (L) 06/03/2019 11:17 AM    RBC 4.17 (L) 06/03/2019 11:17 AM    HEMOGLOBIN 12.3 06/03/2019 11:17 AM    HEMATOCRIT 39.0 06/03/2019 11:17 AM    MCV 93.5 06/03/2019 11:17 AM    MCH 29.5 06/03/2019 11:17 AM    MCHC 31.5 (L) 06/03/2019 11:17 AM    MPV 10.4 06/03/2019 11:17 AM    NEUTSPOLYS 62.70 06/03/2019 11:17 AM    LYMPHOCYTES 25.40 06/03/2019 11:17 AM    MONOCYTES 9.60 06/03/2019 11:17 AM    EOSINOPHILS 1.40 06/03/2019 11:17 AM    BASOPHILS 0.70 06/03/2019 11:17 AM      Lab Results   Component Value Date/Time    CALCIUM 8.5 12/05/2018 02:49 AM    ASTSGOT 17 04/20/2018 02:50 PM    ALTSGPT 14 04/20/2018 02:50 PM    ALKPHOSPHAT 69 04/20/2018 02:50 PM    TBILIRUBIN 0.3 04/20/2018 02:50 PM    ALBUMIN 3.9 04/20/2018 02:50 PM    ALBUMIN 4.29 03/05/2013 09:00 AM    TOTPROTEIN 7.0 04/20/2018 02:50 PM    TOTPROTEIN 7.60 03/05/2013 09:00 AM     Lab Results   Component Value Date/Time    URICACID 3.7 03/04/2017 09:45 AM    RHEUMFACTN 8 02/27/2013 10:56 AM    CCPANTIBODY 3 02/27/2015 08:44 AM    ANTINUCAB None Detected 02/27/2015 08:44 AM     Lab Results   Component Value Date/Time    SEDRATEWES 14 06/03/2019 11:17 AM     Lab Results   Component Value Date/Time    HBA1C 5.5 05/19/2014 09:25 AM     Lab Results   Component Value Date/Time    CPKTOTAL 84 11/30/2016 10:32 AM     Results for orders placed during the hospital encounter of 03/06/18   DS-BONE DENSITY  STUDY (DEXA)    Impression According to the World Health Organization classification, bone mineral density of this patient is osteopenic.        10-year Probability of Fracture:  Major Osteoporotic     26.1%  Hip     9.5%  Population      [place] ()    Based on left femur neck BMD          INTERPRETING LOCATION:  [address] [place] [address], [id number]     Results for orders placed during the hospital encounter of 07/03/19   DX-SHOULDER 2+ LEFT    Impression 1.  Mild degenerative change of LEFT shoulder.  2.  No fracture or dislocation.     Results for orders placed during the hospital encounter of 06/09/10   DX-HAND 3+     Results for orders placed during the hospital encounter of 05/18/16   DX-WRIST-COMPLETE 3+ RIGHT    Impression Degenerative changes of the wrist and base of the thumb without definite acute osseous abnormality.     Results for orders placed during the hospital encounter of 07/05/18   MR-LUMBAR SPINE-W/O    Impression 1.  No significant change in foraminal greater than lateral recess stenoses, greatest stenoses are moderate foraminal at L5/S1 on the right, L1/2 on the left.  2.  3.  Severe facet arthropathy greater than degenerative disc disease    4.  Worsening discogenic edema at several levels, greatest involvement is still L1/2    5.  Stable several levels of degenerative listhesis     Results for orders placed during the hospital encounter of 03/07/18   MR-CERVICAL SPINE-W/O    Impression 1.  C3-C4 marked Modic type I discogenic endplate marrow edema. Associated Schmorl's node defect at the C3 inferior endplate. This can be a pain generator.  2.  Mild degenerative anterolisthesis C3 on C4 and retrolisthesis C5 on C6.  3.  C3-C4 mild central stenosis due to disc-osteophyte complex and ligamentum flavum buckling or hypertrophy. Mild right foraminal stenosis. Mild left-sided hypertrophic facet arthropathy. Moderate left foraminal stenosis.  4.  C4-5 mild left-sided hypertrophic facet  arthropathy.  5.  C5-6 degenerative disc space narrowing. Small disc/osteophyte complex accentuated by retrolisthesis. Facet arthropathy. Borderline bilateral foraminal stenosis.  6.  C6-C7 minimal disc/osteophyte complex. Mild bilateral facet arthropathy. Mild right foraminal stenosis due to uncinate spondylosis.  7.  No myelopathic cord signal abnormality at any cervical level.     Results for orders placed during the hospital encounter of 06/09/10   DX-CERVICAL SPINE-2 OR 3 VIEWS     Assessment and Plan:     1. Primary osteoarthritis involving multiple joints  Refill meloxicam 5 mg p.o. daily, refer to physical therapy for physical therapy on both shoulders,  Status post right shoulder x-ray showed minimal DJD will do an MRI of right shoulder for evaluation rotator cuff pathology possible need for surgical intervention  - Meloxicam 5 MG Cap; Take 5 mg by mouth every day.  Dispense: 90 Cap; Refill: 0  - Comp Metabolic Panel; Standing  - WESTERGREN SED RATE; Future  - REFERRAL TO PHYSICAL THERAPY Reason for Therapy: Eval/Treat/Report  - MR-SHOULDER-W/O RIGHT; Future    2. Polymyalgia (HCC)  Past diagnosis of, has been in remission for quite some time, patient wants to check another ESR today ESR ordered for patient  - Meloxicam 5 MG Cap; Take 5 mg by mouth every day.  Dispense: 90 Cap; Refill: 0  - Comp Metabolic Panel; Standing  - WESTERGREN SED RATE; Future  - REFERRAL TO PHYSICAL THERAPY Reason for Therapy: Eval/Treat/Report  - MR-SHOULDER-W/O RIGHT; Future    3. Senile osteoporosis  Last DEXA March 2018 Next DEXA March 2020  Currently on Fosamax 70 mg p.o. weekly since March 2018   continue calcium citrate 1200 mg by mouth daily and vitamin D about 2000 units by mouth daily and magnesium 200 mg by mouth daily  - alendronate (FOSAMAX) 70 MG Tab; Take 1 Tab by mouth every 7 days. On Wed  Dispense: 12 Tab; Refill: 1  - Meloxicam 5 MG Cap; Take 5 mg by mouth every day.  Dispense: 90 Cap; Refill: 0  - Comp Metabolic  Panel; Standing  - WESTERGREN SED RATE; Future  - REFERRAL TO PHYSICAL THERAPY Reason for Therapy: Eval/Treat/Report  - MR-SHOULDER-W/O RIGHT; Future    4. Long term use of alendronate therapy (Fosamax)  Continue to monitor for renal and liver function  - alendronate (FOSAMAX) 70 MG Tab; Take 1 Tab by mouth every 7 days. On Wed  Dispense: 12 Tab; Refill: 1  - REFERRAL TO PHYSICAL THERAPY Reason for Therapy: Eval/Treat/Report  - MR-SHOULDER-W/O RIGHT; Future    5. NSAID long-term use  On meloxicam 5 mg p.o. daily, will need monitoring labs every 6 months next labs will be due about December 2019, labs ordered for patient  - REFERRAL TO PHYSICAL THERAPY Reason for Therapy: Eval/Treat/Report  - MR-SHOULDER-W/O RIGHT; Future    6. Chronic right shoulder pain  Probably rotator cuff pathology, today we will do a right shoulder corticosteroid injection and also schedule MRI for right shoulder  - REFERRAL TO PHYSICAL THERAPY Reason for Therapy: Eval/Treat/Report  - MR-SHOULDER-W/O RIGHT; Future  - methylPREDNISolone acetate (DEPO-MEDROL) injection 20 mg    7. Hypothyroidism, unspecified type  Can exacerbate joint pain, I recommend monitoring thyroid on a regular basis to assure it is not contributing to the patient's joint pain  - REFERRAL TO PHYSICAL THERAPY Reason for Therapy: Eval/Treat/Report  - MR-SHOULDER-W/O RIGHT; Future    Procedure: Right shoulder corticosteroid injection    The procedure was explained to the patient in detail including potential damage to area being injected including risk of avascular necrosis, all questions were answered, verbal consent to do procedure was obtained. Risks and benefits were reviewed with patient and patient states understanding including risks of steroid injections including bleeding, infections, subcutaneous lipolysis and/or hypopigmentation at site of injection, and risk of avascular necrosis. Verbal consent was again obtained. The patient was positioned appropriately.  Anatomical landmarks were identified.    Lateral aspect of right shoulder was prepped with betadine x 3, local anesthetic with ethyl chloride and 2% Xylocaine lot #[id number], Exp September 2022 and using sterile technique,  injected 20 mg of Depomedrol Lot #[id number], Exp May 2020 lateral approach right shoulder    EBL 0  The patient tolerated the procedure well, was observed in the office and there were no complications     Patient was asked to rest right shoulder for 3 days, patient states understanding and states will comply.        Followup: Return in about 4 months (around 12/14/2019). or sooner nupur Menendez  was seen 30 minutes face-to-face of which more than 50% of the time was spent counseling the patient (excluding time for procedures)  regarding  rheumatological condition and care. Therapy was discussed in detail.      Please note that this dictation was created using voice recognition software. I have made every reasonable attempt to correct obvious errors, but I expect that there are errors of grammar and possibly content that I did not discover before finalizing the note.

## 2019-08-15 ENCOUNTER — HOSPITAL ENCOUNTER (OUTPATIENT)
Dept: LAB | Facility: MEDICAL CENTER | Age: 84
End: 2019-08-15
Attending: INTERNAL MEDICINE
Payer: MEDICARE

## 2019-08-15 DIAGNOSIS — M35.3 POLYMYALGIA (HCC): ICD-10-CM

## 2019-08-15 DIAGNOSIS — M15.9 PRIMARY OSTEOARTHRITIS INVOLVING MULTIPLE JOINTS: ICD-10-CM

## 2019-08-15 DIAGNOSIS — M81.0 SENILE OSTEOPOROSIS: ICD-10-CM

## 2019-08-15 LAB
ALBUMIN SERPL BCP-MCNC: 4 G/DL (ref 3.2–4.9)
ALBUMIN/GLOB SERPL: 1.5 G/DL
ALP SERPL-CCNC: 68 U/L (ref 30–99)
ALT SERPL-CCNC: 14 U/L (ref 2–50)
ANION GAP SERPL CALC-SCNC: 6 MMOL/L (ref 0–11.9)
AST SERPL-CCNC: 19 U/L (ref 12–45)
BILIRUB SERPL-MCNC: 0.4 MG/DL (ref 0.1–1.5)
BUN SERPL-MCNC: 21 MG/DL (ref 8–22)
CALCIUM SERPL-MCNC: 9.5 MG/DL (ref 8.5–10.5)
CHLORIDE SERPL-SCNC: 98 MMOL/L (ref 96–112)
CO2 SERPL-SCNC: 25 MMOL/L (ref 20–33)
CREAT SERPL-MCNC: 0.83 MG/DL (ref 0.5–1.4)
ERYTHROCYTE [SEDIMENTATION RATE] IN BLOOD BY WESTERGREN METHOD: 14 MM/HOUR (ref 0–30)
GLOBULIN SER CALC-MCNC: 2.6 G/DL (ref 1.9–3.5)
GLUCOSE SERPL-MCNC: 97 MG/DL (ref 65–99)
POTASSIUM SERPL-SCNC: 4.2 MMOL/L (ref 3.6–5.5)
PROT SERPL-MCNC: 6.6 G/DL (ref 6–8.2)
SODIUM SERPL-SCNC: 129 MMOL/L (ref 135–145)

## 2019-08-15 PROCEDURE — 80053 COMPREHEN METABOLIC PANEL: CPT

## 2019-08-15 PROCEDURE — 85652 RBC SED RATE AUTOMATED: CPT

## 2019-08-15 PROCEDURE — 36415 COLL VENOUS BLD VENIPUNCTURE: CPT

## 2019-08-19 ENCOUNTER — TELEPHONE (OUTPATIENT)
Dept: RHEUMATOLOGY | Facility: MEDICAL CENTER | Age: 84
End: 2019-08-19

## 2019-08-19 DIAGNOSIS — E87.1 HYPONATREMIA: ICD-10-CM

## 2019-08-19 NOTE — TELEPHONE ENCOUNTER
Please notify patient that we received the results of her blood test and her sodium tests is just a little bit low    Probably a laboratory error but we need to recheck her sodium    Sodium blood test ordered in the computer, patient does not need to be fasting

## 2019-08-27 ENCOUNTER — TELEPHONE (OUTPATIENT)
Dept: RHEUMATOLOGY | Facility: MEDICAL CENTER | Age: 84
End: 2019-08-27

## 2019-08-27 DIAGNOSIS — M15.9 PRIMARY OSTEOARTHRITIS INVOLVING MULTIPLE JOINTS: ICD-10-CM

## 2019-08-27 NOTE — TELEPHONE ENCOUNTER
Please notify patient that her insurance will not pay for meloxicam/Vivlodex,  We will switch to Voltaren 50 mg 1 tablet p.o. up to 2 times daily as needed which insurance states they will cover    Voltaren sent to [facility] pharmacy on [address]

## 2019-09-04 ENCOUNTER — HOSPITAL ENCOUNTER (OUTPATIENT)
Dept: LAB | Facility: MEDICAL CENTER | Age: 84
End: 2019-09-04
Attending: INTERNAL MEDICINE
Payer: MEDICARE

## 2019-09-04 DIAGNOSIS — E87.1 HYPONATREMIA: ICD-10-CM

## 2019-09-04 LAB
ANION GAP SERPL CALC-SCNC: 8 MMOL/L (ref 0–11.9)
BUN SERPL-MCNC: 17 MG/DL (ref 8–22)
CALCIUM SERPL-MCNC: 9.2 MG/DL (ref 8.5–10.5)
CHLORIDE SERPL-SCNC: 101 MMOL/L (ref 96–112)
CO2 SERPL-SCNC: 22 MMOL/L (ref 20–33)
CREAT SERPL-MCNC: 0.79 MG/DL (ref 0.5–1.4)
GLUCOSE SERPL-MCNC: 83 MG/DL (ref 65–99)
POTASSIUM SERPL-SCNC: 4.4 MMOL/L (ref 3.6–5.5)
SODIUM SERPL-SCNC: 131 MMOL/L (ref 135–145)

## 2019-09-04 PROCEDURE — 80048 BASIC METABOLIC PNL TOTAL CA: CPT

## 2019-09-04 PROCEDURE — 36415 COLL VENOUS BLD VENIPUNCTURE: CPT

## 2019-09-06 ENCOUNTER — HOSPITAL ENCOUNTER (OUTPATIENT)
Dept: RADIOLOGY | Facility: MEDICAL CENTER | Age: 84
End: 2019-09-06
Attending: INTERNAL MEDICINE
Payer: MEDICARE

## 2019-09-06 DIAGNOSIS — G89.29 CHRONIC RIGHT SHOULDER PAIN: ICD-10-CM

## 2019-09-06 DIAGNOSIS — Z79.1 NSAID LONG-TERM USE: ICD-10-CM

## 2019-09-06 DIAGNOSIS — Z79.83 LONG TERM USE OF ALENDRONATE THERAPY (FOSAMAX): ICD-10-CM

## 2019-09-06 DIAGNOSIS — M81.0 SENILE OSTEOPOROSIS: ICD-10-CM

## 2019-09-06 DIAGNOSIS — E03.9 HYPOTHYROIDISM, UNSPECIFIED TYPE: ICD-10-CM

## 2019-09-06 DIAGNOSIS — M15.9 PRIMARY OSTEOARTHRITIS INVOLVING MULTIPLE JOINTS: ICD-10-CM

## 2019-09-06 DIAGNOSIS — M35.3 POLYMYALGIA (HCC): ICD-10-CM

## 2019-09-06 DIAGNOSIS — M25.511 CHRONIC RIGHT SHOULDER PAIN: ICD-10-CM

## 2019-09-06 PROCEDURE — 73221 MRI JOINT UPR EXTREM W/O DYE: CPT | Mod: RT

## 2019-09-09 ENCOUNTER — TELEPHONE (OUTPATIENT)
Dept: RHEUMATOLOGY | Facility: MEDICAL CENTER | Age: 84
End: 2019-09-09

## 2019-09-09 NOTE — TELEPHONE ENCOUNTER
Please notify patient that we received the results of the MRI of her shoulder and it does show some rotator cuff tendinitis and slight fraying compatible with the rotator cuff wearing out, if the corticosteroid injection that we did at last visit did not help then I can have patient see orthopedics for further evaluation.    Does patient want to see orthopedics?

## 2019-09-10 DIAGNOSIS — G89.29 CHRONIC RIGHT SHOULDER PAIN: ICD-10-CM

## 2019-09-10 DIAGNOSIS — M25.511 CHRONIC RIGHT SHOULDER PAIN: ICD-10-CM

## 2019-09-10 NOTE — TELEPHONE ENCOUNTER
I CALLED AND SPOKE WITH PT.  SHE STATED THE INJECTION MAYBE HELPED FOR A WEEK AT THE MOST. PLEASE REFER HER TO THE RHE[facility] FOR AN EVAL. THANK YOU

## 2019-09-27 ENCOUNTER — HOSPITAL ENCOUNTER (OUTPATIENT)
Dept: LAB | Facility: MEDICAL CENTER | Age: 84
End: 2019-09-27
Attending: FAMILY MEDICINE
Payer: MEDICARE

## 2019-09-27 LAB
T3 SERPL-MCNC: 95.3 NG/DL (ref 60–181)
T4 SERPL-MCNC: 4.8 UG/DL (ref 4–12)
TSH SERPL DL<=0.005 MIU/L-ACNC: 1 UIU/ML (ref 0.38–5.33)

## 2019-09-27 PROCEDURE — 84443 ASSAY THYROID STIM HORMONE: CPT

## 2019-09-27 PROCEDURE — 84436 ASSAY OF TOTAL THYROXINE: CPT

## 2019-09-27 PROCEDURE — 84480 ASSAY TRIIODOTHYRONINE (T3): CPT

## 2019-09-27 PROCEDURE — 36415 COLL VENOUS BLD VENIPUNCTURE: CPT

## 2020-01-27 ENCOUNTER — HOSPITAL ENCOUNTER (OUTPATIENT)
Dept: RADIOLOGY | Facility: MEDICAL CENTER | Age: 85
End: 2020-01-27
Attending: NEUROLOGICAL SURGERY
Payer: MEDICARE

## 2020-01-27 DIAGNOSIS — M48.061 SPINAL STENOSIS, LUMBAR REGION, WITHOUT NEUROGENIC CLAUDICATION: ICD-10-CM

## 2020-01-27 PROCEDURE — 72110 X-RAY EXAM L-2 SPINE 4/>VWS: CPT

## 2020-01-29 ENCOUNTER — APPOINTMENT (OUTPATIENT)
Dept: RHEUMATOLOGY | Facility: MEDICAL CENTER | Age: 85
End: 2020-01-29
Payer: MEDICARE

## 2020-02-25 ENCOUNTER — OFFICE VISIT (OUTPATIENT)
Dept: RHEUMATOLOGY | Facility: MEDICAL CENTER | Age: 85
End: 2020-02-25
Payer: MEDICARE

## 2020-02-25 VITALS
DIASTOLIC BLOOD PRESSURE: 60 MMHG | RESPIRATION RATE: 14 BRPM | HEART RATE: 88 BPM | BODY MASS INDEX: 20.99 KG/M2 | OXYGEN SATURATION: 97 % | WEIGHT: 134 LBS | SYSTOLIC BLOOD PRESSURE: 130 MMHG | TEMPERATURE: 97.7 F

## 2020-02-25 DIAGNOSIS — M15.9 PRIMARY OSTEOARTHRITIS INVOLVING MULTIPLE JOINTS: ICD-10-CM

## 2020-02-25 DIAGNOSIS — Z79.83 LONG TERM USE OF ALENDRONATE THERAPY (FOSAMAX): ICD-10-CM

## 2020-02-25 DIAGNOSIS — E03.9 HYPOTHYROIDISM, UNSPECIFIED TYPE: ICD-10-CM

## 2020-02-25 DIAGNOSIS — M35.3 POLYMYALGIA (HCC): ICD-10-CM

## 2020-02-25 DIAGNOSIS — M81.0 SENILE OSTEOPOROSIS: ICD-10-CM

## 2020-02-25 PROCEDURE — 99214 OFFICE O/P EST MOD 30 MIN: CPT | Performed by: INTERNAL MEDICINE

## 2020-02-25 RX ORDER — TAMSULOSIN HYDROCHLORIDE 0.4 MG/1
0.4 CAPSULE ORAL EVERY EVENING
COMMUNITY

## 2020-02-25 RX ORDER — ALENDRONATE SODIUM 70 MG/1
70 TABLET ORAL
Qty: 12 TAB | Refills: 1 | Status: SHIPPED | OUTPATIENT
Start: 2020-02-25 | End: 2020-09-08 | Stop reason: SDUPTHER

## 2020-02-25 NOTE — PROGRESS NOTES
Chief Complaint- joint pain     Subjective:   Susan Menendez is a 84 y.o. female here today for follow up of rheumatologica    This is a follow-up visit for this patient who is seen in this clinic predominantly for DJD and osteoporosis.  There is a remote history of PMR for which patient has been in remission for quite some time with normal sedimentation rate.  Patient currently takes Tylenol as needed.  Of note recent sedimentation rate equals 14 August 2019.    Since last visit patient has had surgery on her right shoulder at [facility] with benefit.    Since last visit patient had a fall about October 2019 hitting her head resulting in a concussion, patient already status post physical therapy and treatment.  Patient states that she also developed to compression fractures in her T-spine secondary to the fall, patient now being followed by neurosurgery.     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  Additional comorbidities include a diagnosis of autoimmune hepatitis and interstitial cystitis.  Patient also states she had a history of fibromyalgia in the past.  Patient also history of low back surgery with benefit in December 2018.      Uric acid 3.4 2/2013  RF neg 2/2013  CCP neg 2/2015  MARLEE neg 2/2015  DEXA 10/2014 T scores -2.3, -1.2  DEXA 11/8/2016 T scores -0.8, -2.3    FRAX 11/8/2016 major osteoporotic fracture risk is 23.8%, hip fracture risk 8.4%  DEXA 3/7/2018 T scores -0.6, -2.1  FRAX 3/7/2018 major osteoporotic fracture risk 26.1%, hip fracture risk 9.5%  Patient has been on Fosamax since March 2018  MRI LS spine 10/2016-indicates DJD and mild to moderate neural foraminal narrowing   Left shoulder x-rays 7/2019-   Impression         1.  Mild degenerative change of LEFT shoulder.  2.  No fracture or dislocation.      Right shoulder x-rays  7/2019 -  Impression         1.  Moderate right acromioclavicular osteoarthrosis.  2.  Mild glenohumeral osteoarthrosis.  3.  No acute fracture or dislocation.        Current medicines (including changes today)  Current Outpatient Medications   Medication Sig Dispense Refill   • tamsulosin (FLOMAX) 0.4 MG capsule Take 0.4 mg by mouth ONE-HALF HOUR AFTER BREAKFAST.     • alendronate (FOSAMAX) 70 MG Tab Take 1 Tab by mouth every 7 days. On Wed 12 Tab 1   • Testosterone 20 % Cream by Does not apply route.     • docusate sodium 100 MG Cap Take 100 mg by mouth 2 Times a Day. 60 Cap    • thyroid (ARMOUR THYROID) 60 MG Tab Take 60 mg by mouth every day.     • estradiol (ESTRACE) 0.1 MG/GM vaginal cream Insert 1 g in vagina every evening.     • polyethyl glycol-propyl glycol (SYSTANE) 0.4-0.3 % Solution Place 1 Drop in both eyes 2 Times a Day.     • Cholecalciferol (VITAMIN D) 2000 units Cap Take 2,000 Units by mouth every day.     • acetaminophen (TYLENOL) 500 MG Tab Take 1,500 mg by mouth every 6 hours as needed for Moderate Pain.     • Probiotic Product (ALIGN) Cap Take 1 Cap by mouth every day.     • duloxetine (CYMBALTA) 60 MG CPEP Take 120 mg by mouth every day.     • trazodone (DESYREL) 100 MG TABS Take 50 mg by mouth every evening.     • Multiple Vitamins-Minerals (EYE VITAMINS PO) Take 1 Tab by mouth every day.     • LYSINE PO Take 1 Tab by mouth every day.     • Multiple Vitamins-Minerals (BONE SMART PO) Take 1 Tab by mouth every day.     • pentosan (ELMIRON) 100 MG CAPS Take 100 mg by mouth every day.     • diclofenac EC (VOLTAREN) 50 MG Tablet Delayed Response 1 tab p.o. twice daily to be taken with food as needed joint pain (Patient not taking: Reported on 2/25/2020) 60 Tab 2   • ciprofloxacin (CIPRO) 500 MG Tab Take 1 Tab by mouth 2 times a day. (Patient not taking: Reported on 2/25/2020) 10 Tab 0   • tizanidine (ZANAFLEX) 4 MG Tab Take 1 Tab by mouth 3 times a day as needed. (Patient not taking: Reported on  6/14/2019) 30 Tab 0   • methenamine hip (HIPPREX) 1 GM Tab Take 1 g by mouth every day.     • liothyronine (CYTOMEL) 5 MCG Tab Take 5 mcg by mouth every day.     • gabapentin (NEURONTIN) 300 MG Cap Take 300 mg by mouth 3 times a day.       No current facility-administered medications for this visit.      She  has a past medical history of Anemia, Anesthesia, Arthritis (02/06/2018), Autoimmune hepatitis (HCC), Chronic diarrhea, Chronic UTI, Depression, Hiatus hernia syndrome, History of cataract surgery, Hypothyroid, Interstitial cystitis, Other specified disorder of intestines, Pain, Pneumonia, Urinary bladder disorder, and Varicose veins.    ROS   Other than what is mentioned in HPI or physical exam, there is no history of headaches, double vision or blurred vision. No temporal tenderness or jaw claudication. No trouble swallowing difficulties or sore throats.  No chest complaints including chest pain, cough or sputum production. No GI complaints including nausea, vomiting, change in bowel habits, or past peptic ulcer disease. No history of blood in the stools. No urinary complaints including dysuria or frequency. No history of alopecia, photosensitivity, oral ulcerations, Raynaud's phenomena.       Objective:     /60   Pulse 88   Temp 36.5 °C (97.7 °F) (Temporal)   Resp 14   Wt 60.8 kg (134 lb)   SpO2 97%  Body mass index is 20.99 kg/m².   Physical Exam:    Constitutional: Alert and oriented X3, patient is talkative with good eye contact.Skin: Warm, dry, good turgor, no rashes in visible areas, no Gottron's papules,no psoriatic lesions, no petechial lesions, no malar rashes  .Eye: Equal, round and reactive, conjunctiva clear, lids normal EOM intactENMT: Lips without lesions, good dentition, no oropharyngeal ulcers, moist buccal mucosa, pinna without deformityNeck: Trachea midline, no masses, no thyromegaly.Lymph:  No cervical lymphadenopathy, no axillary lymphadenopathy, no supraclavicular  lymphadenopathyRespiratory: Unlabored respiratory effort, lungs clear to auscultation, no wheezes, no ronchi.Cardiovascular: Normal S1, S2, no murmur, no edema.Abdomen: Soft, non-tender, no masses, no hepatosplenomegaly.Psych: Alert and oriented x3, normal affect and mood.Neuro: Cranial nerves 2-12 are grossly intact, no loss of sensation LEExt:no joint laxity noted in bilateral arms, no joint laxity noted in bilateral legs, some Heberden's nodes on the DIP joints but no evidence of swan-neck but no deformities, crepitus in the knees but no effusions, there is some tenderness palpation on the right elbow bursa status post slight inflammation from a fall, patient states is improving.    Lab Results   Component Value Date/Time    SODIUM 131 (L) 09/04/2019 11:41 AM    POTASSIUM 4.4 09/04/2019 11:41 AM    CHLORIDE 101 09/04/2019 11:41 AM    CO2 22 09/04/2019 11:41 AM    GLUCOSE 83 09/04/2019 11:41 AM    BUN 17 09/04/2019 11:41 AM    CREATININE 0.79 09/04/2019 11:41 AM    CREATININE 0.9 01/04/2005 12:05 PM      Lab Results   Component Value Date/Time    WBC 4.3 (L) 06/03/2019 11:17 AM    RBC 4.17 (L) 06/03/2019 11:17 AM    HEMOGLOBIN 12.3 06/03/2019 11:17 AM    HEMATOCRIT 39.0 06/03/2019 11:17 AM    MCV 93.5 06/03/2019 11:17 AM    MCH 29.5 06/03/2019 11:17 AM    MCHC 31.5 (L) 06/03/2019 11:17 AM    MPV 10.4 06/03/2019 11:17 AM    NEUTSPOLYS 62.70 06/03/2019 11:17 AM    LYMPHOCYTES 25.40 06/03/2019 11:17 AM    MONOCYTES 9.60 06/03/2019 11:17 AM    EOSINOPHILS 1.40 06/03/2019 11:17 AM    BASOPHILS 0.70 06/03/2019 11:17 AM      Lab Results   Component Value Date/Time    CALCIUM 9.2 09/04/2019 11:41 AM    ASTSGOT 19 08/15/2019 02:31 PM    ALTSGPT 14 08/15/2019 02:31 PM    ALKPHOSPHAT 68 08/15/2019 02:31 PM    TBILIRUBIN 0.4 08/15/2019 02:31 PM    ALBUMIN 4.0 08/15/2019 02:31 PM    ALBUMIN 4.29 03/05/2013 09:00 AM    TOTPROTEIN 6.6 08/15/2019 02:31 PM    TOTPROTEIN 7.60 03/05/2013 09:00 AM     Lab Results   Component Value  Date/Time    URICACID 3.7 03/04/2017 09:45 AM    RHEUMFACTN 8 02/27/2013 10:56 AM    CCPANTIBODY 3 02/27/2015 08:44 AM    ANTINUCAB None Detected 02/27/2015 08:44 AM     Lab Results   Component Value Date/Time    SEDRATEWES 14 08/15/2019 02:31 PM     Lab Results   Component Value Date/Time    HBA1C 5.5 05/19/2014 09:25 AM     Lab Results   Component Value Date/Time    CPKTOTAL 84 11/30/2016 10:32 AM     Results for orders placed during the hospital encounter of 03/06/18   DS-BONE DENSITY STUDY (DEXA)    Impression According to the World Health Organization classification, bone mineral density of this patient is osteopenic.        10-year Probability of Fracture:  Major Osteoporotic     26.1%  Hip     9.5%  Population      [place] ()    Based on left femur neck BMD          INTERPRETING LOCATION:  [address] [place] [address], [facility]     Results for orders placed during the hospital encounter of 07/03/19   DX-SHOULDER 2+ LEFT    Impression 1.  Mild degenerative change of LEFT shoulder.  2.  No fracture or dislocation.     Results for orders placed during the hospital encounter of 06/09/10   DX-HAND 3+     Results for orders placed during the hospital encounter of 05/18/16   DX-WRIST-COMPLETE 3+ RIGHT    Impression Degenerative changes of the wrist and base of the thumb without definite acute osseous abnormality.     Results for orders placed during the hospital encounter of 07/05/18   MR-LUMBAR SPINE-W/O    Impression 1.  No significant change in foraminal greater than lateral recess stenoses, greatest stenoses are moderate foraminal at L5/S1 on the right, L1/2 on the left.  2.  3.  Severe facet arthropathy greater than degenerative disc disease    4.  Worsening discogenic edema at several levels, greatest involvement is still L1/2    5.  Stable several levels of degenerative listhesis     Results for orders placed during the hospital encounter of 03/07/18   MR-CERVICAL SPINE-W/O    Impression 1.  C3-C4 marked  Modic type I discogenic endplate marrow edema. Associated Schmorl's node defect at the C3 inferior endplate. This can be a pain generator.  2.  Mild degenerative anterolisthesis C3 on C4 and retrolisthesis C5 on C6.  3.  C3-C4 mild central stenosis due to disc-osteophyte complex and ligamentum flavum buckling or hypertrophy. Mild right foraminal stenosis. Mild left-sided hypertrophic facet arthropathy. Moderate left foraminal stenosis.  4.  C4-5 mild left-sided hypertrophic facet arthropathy.  5.  C5-6 degenerative disc space narrowing. Small disc/osteophyte complex accentuated by retrolisthesis. Facet arthropathy. Borderline bilateral foraminal stenosis.  6.  C6-C7 minimal disc/osteophyte complex. Mild bilateral facet arthropathy. Mild right foraminal stenosis due to uncinate spondylosis.  7.  No myelopathic cord signal abnormality at any cervical level.     Results for orders placed during the hospital encounter of 06/09/10   DX-CERVICAL SPINE-2 OR 3 VIEWS     Assessment and Plan:     1. Primary osteoarthritis involving multiple joints  Currently Tylenol as needed, continue to monitor we can do corticosteroid injections as needed  - CBC WITH DIFFERENTIAL; Future  - Comp Metabolic Panel; Future  - Sed Rate; Future  - DS-BONE DENSITY STUDY (DEXA); Future  - alendronate (FOSAMAX) 70 MG Tab; Take 1 Tab by mouth every 7 days. On Wed  Dispense: 12 Tab; Refill: 1    2. Polymyalgia (HCC)  Remote history of PMR, clinically without evidence of any symptoms, will check ESR today  - CBC WITH DIFFERENTIAL; Future  - Comp Metabolic Panel; Future  - Sed Rate; Future  - DS-BONE DENSITY STUDY (DEXA); Future  - alendronate (FOSAMAX) 70 MG Tab; Take 1 Tab by mouth every 7 days. On Wed  Dispense: 12 Tab; Refill: 1    3. Senile osteoporosis  Currently on Fosamax 70 mg p.o. weekly since March 2018, Fosamax refilled today patient is due for DEXA, DEXA ordered for patient   continue calcium citrate 1200 mg by mouth daily and vitamin D  about 2000 units by mouth daily and magnesium 200 mg by mouth daily  - CBC WITH DIFFERENTIAL; Future  - Comp Metabolic Panel; Future  - Sed Rate; Future  - DS-BONE DENSITY STUDY (DEXA); Future  - alendronate (FOSAMAX) 70 MG Tab; Take 1 Tab by mouth every 7 days. On Wed  Dispense: 12 Tab; Refill: 1    4. Long term use of alendronate therapy (Fosamax)  On Fosamax 70 mg p.o. weekly, Fosamax refilled for patient  Will need to check calcium and renal functions every 6 months, next labs will be due now, labs ordered for patient  - alendronate (FOSAMAX) 70 MG Tab; Take 1 Tab by mouth every 7 days. On Wed  Dispense: 12 Tab; Refill: 1    5. Hypothyroidism, unspecified type  Can exacerbate joint pain, I recommend monitoring thyroid on a regular basis to assure it is not contributing to the patient's joint pain    Followup: Return in about 6 months (around 8/25/2020). or sooner nupur Menendez  was seen 30 minutes face-to-face of which more than 50% of the time was spent counseling the patient (excluding time for procedures)  regarding  rheumatological condition and care. Therapy was discussed in detail.      Please note that this dictation was created using voice recognition software. I have made every reasonable attempt to correct obvious errors, but I expect that there are errors of grammar and possibly content that I did not discover before finalizing the note.

## 2020-02-26 ENCOUNTER — HOSPITAL ENCOUNTER (OUTPATIENT)
Dept: LAB | Facility: MEDICAL CENTER | Age: 85
End: 2020-02-26
Attending: INTERNAL MEDICINE
Payer: MEDICARE

## 2020-02-26 DIAGNOSIS — M15.9 PRIMARY OSTEOARTHRITIS INVOLVING MULTIPLE JOINTS: ICD-10-CM

## 2020-02-26 DIAGNOSIS — M81.0 SENILE OSTEOPOROSIS: ICD-10-CM

## 2020-02-26 DIAGNOSIS — M35.3 POLYMYALGIA (HCC): ICD-10-CM

## 2020-02-26 LAB
ALBUMIN SERPL BCP-MCNC: 4 G/DL (ref 3.2–4.9)
ALBUMIN/GLOB SERPL: 1.5 G/DL
ALP SERPL-CCNC: 66 U/L (ref 30–99)
ALT SERPL-CCNC: 12 U/L (ref 2–50)
ANION GAP SERPL CALC-SCNC: 8 MMOL/L (ref 0–11.9)
AST SERPL-CCNC: 14 U/L (ref 12–45)
BASOPHILS # BLD AUTO: 0.9 % (ref 0–1.8)
BASOPHILS # BLD: 0.06 K/UL (ref 0–0.12)
BILIRUB SERPL-MCNC: 0.4 MG/DL (ref 0.1–1.5)
BUN SERPL-MCNC: 23 MG/DL (ref 8–22)
CALCIUM SERPL-MCNC: 9.2 MG/DL (ref 8.5–10.5)
CHLORIDE SERPL-SCNC: 101 MMOL/L (ref 96–112)
CO2 SERPL-SCNC: 23 MMOL/L (ref 20–33)
CREAT SERPL-MCNC: 0.94 MG/DL (ref 0.5–1.4)
EOSINOPHIL # BLD AUTO: 0.1 K/UL (ref 0–0.51)
EOSINOPHIL NFR BLD: 1.4 % (ref 0–6.9)
ERYTHROCYTE [DISTWIDTH] IN BLOOD BY AUTOMATED COUNT: 45.6 FL (ref 35.9–50)
ERYTHROCYTE [SEDIMENTATION RATE] IN BLOOD BY WESTERGREN METHOD: 12 MM/HOUR (ref 0–30)
FASTING STATUS PATIENT QL REPORTED: NORMAL
GLOBULIN SER CALC-MCNC: 2.6 G/DL (ref 1.9–3.5)
GLUCOSE SERPL-MCNC: 116 MG/DL (ref 65–99)
HCT VFR BLD AUTO: 35.8 % (ref 37–47)
HGB BLD-MCNC: 12.1 G/DL (ref 12–16)
IMM GRANULOCYTES # BLD AUTO: 0.02 K/UL (ref 0–0.11)
IMM GRANULOCYTES NFR BLD AUTO: 0.3 % (ref 0–0.9)
LYMPHOCYTES # BLD AUTO: 1.62 K/UL (ref 1–4.8)
LYMPHOCYTES NFR BLD: 23.1 % (ref 22–41)
MCH RBC QN AUTO: 30.7 PG (ref 27–33)
MCHC RBC AUTO-ENTMCNC: 33.8 G/DL (ref 33.6–35)
MCV RBC AUTO: 90.9 FL (ref 81.4–97.8)
MONOCYTES # BLD AUTO: 0.56 K/UL (ref 0–0.85)
MONOCYTES NFR BLD AUTO: 8 % (ref 0–13.4)
NEUTROPHILS # BLD AUTO: 4.64 K/UL (ref 2–7.15)
NEUTROPHILS NFR BLD: 66.3 % (ref 44–72)
NRBC # BLD AUTO: 0 K/UL
NRBC BLD-RTO: 0 /100 WBC
PLATELET # BLD AUTO: 340 K/UL (ref 164–446)
PMV BLD AUTO: 10.1 FL (ref 9–12.9)
POTASSIUM SERPL-SCNC: 4.2 MMOL/L (ref 3.6–5.5)
PROT SERPL-MCNC: 6.6 G/DL (ref 6–8.2)
RBC # BLD AUTO: 3.94 M/UL (ref 4.2–5.4)
SODIUM SERPL-SCNC: 132 MMOL/L (ref 135–145)
WBC # BLD AUTO: 7 K/UL (ref 4.8–10.8)

## 2020-02-26 PROCEDURE — 85025 COMPLETE CBC W/AUTO DIFF WBC: CPT

## 2020-02-26 PROCEDURE — 80053 COMPREHEN METABOLIC PANEL: CPT

## 2020-02-26 PROCEDURE — 36415 COLL VENOUS BLD VENIPUNCTURE: CPT

## 2020-02-26 PROCEDURE — 85652 RBC SED RATE AUTOMATED: CPT

## 2020-03-04 ENCOUNTER — HOSPITAL ENCOUNTER (OUTPATIENT)
Dept: RADIOLOGY | Facility: MEDICAL CENTER | Age: 85
End: 2020-03-04
Attending: CLINICAL NURSE SPECIALIST
Payer: MEDICARE

## 2020-03-04 DIAGNOSIS — M48.54XA COLLAPSE OF THORACIC VERTEBRA, INITIAL ENCOUNTER (HCC): ICD-10-CM

## 2020-03-04 PROCEDURE — 72070 X-RAY EXAM THORAC SPINE 2VWS: CPT

## 2020-03-04 PROCEDURE — 72100 X-RAY EXAM L-S SPINE 2/3 VWS: CPT

## 2020-06-16 ENCOUNTER — HOSPITAL ENCOUNTER (OUTPATIENT)
Dept: RADIOLOGY | Facility: MEDICAL CENTER | Age: 85
End: 2020-06-16
Attending: PODIATRIST
Payer: MEDICARE

## 2020-06-16 DIAGNOSIS — M79.674 PAIN IN TOE OF RIGHT FOOT: ICD-10-CM

## 2020-06-16 PROCEDURE — 73630 X-RAY EXAM OF FOOT: CPT | Mod: RT

## 2020-07-28 ENCOUNTER — HOSPITAL ENCOUNTER (OUTPATIENT)
Dept: LAB | Facility: MEDICAL CENTER | Age: 85
End: 2020-07-28
Attending: PODIATRIST
Payer: MEDICARE

## 2020-07-28 LAB — CREAT SERPL-MCNC: 0.77 MG/DL (ref 0.5–1.4)

## 2020-07-28 PROCEDURE — 36415 COLL VENOUS BLD VENIPUNCTURE: CPT

## 2020-07-28 PROCEDURE — 82565 ASSAY OF CREATININE: CPT

## 2020-08-13 ENCOUNTER — HOSPITAL ENCOUNTER (OUTPATIENT)
Dept: LAB | Facility: MEDICAL CENTER | Age: 85
End: 2020-08-13
Attending: FAMILY MEDICINE
Payer: MEDICARE

## 2020-08-25 ENCOUNTER — APPOINTMENT (OUTPATIENT)
Dept: RHEUMATOLOGY | Facility: MEDICAL CENTER | Age: 85
End: 2020-08-25
Payer: MEDICARE

## 2020-09-03 ENCOUNTER — APPOINTMENT (OUTPATIENT)
Dept: RADIOLOGY | Facility: MEDICAL CENTER | Age: 85
End: 2020-09-03
Attending: PODIATRIST
Payer: MEDICARE

## 2020-09-08 ENCOUNTER — HOSPITAL ENCOUNTER (OUTPATIENT)
Dept: RADIOLOGY | Facility: MEDICAL CENTER | Age: 85
End: 2020-09-08
Attending: INTERNAL MEDICINE
Payer: MEDICARE

## 2020-09-08 ENCOUNTER — HOSPITAL ENCOUNTER (OUTPATIENT)
Dept: LAB | Facility: MEDICAL CENTER | Age: 85
End: 2020-09-08
Attending: INTERNAL MEDICINE
Payer: MEDICARE

## 2020-09-08 ENCOUNTER — OFFICE VISIT (OUTPATIENT)
Dept: RHEUMATOLOGY | Facility: MEDICAL CENTER | Age: 85
End: 2020-09-08
Payer: MEDICARE

## 2020-09-08 VITALS
WEIGHT: 134 LBS | TEMPERATURE: 97.1 F | RESPIRATION RATE: 12 BRPM | SYSTOLIC BLOOD PRESSURE: 140 MMHG | OXYGEN SATURATION: 97 % | DIASTOLIC BLOOD PRESSURE: 70 MMHG | BODY MASS INDEX: 20.99 KG/M2 | HEART RATE: 84 BPM

## 2020-09-08 DIAGNOSIS — Z79.83 LONG TERM USE OF ALENDRONATE THERAPY (FOSAMAX): ICD-10-CM

## 2020-09-08 DIAGNOSIS — M35.3 POLYMYALGIA (HCC): ICD-10-CM

## 2020-09-08 DIAGNOSIS — M81.0 SENILE OSTEOPOROSIS: ICD-10-CM

## 2020-09-08 DIAGNOSIS — M15.9 PRIMARY OSTEOARTHRITIS INVOLVING MULTIPLE JOINTS: ICD-10-CM

## 2020-09-08 DIAGNOSIS — L40.9 PSORIASIS: ICD-10-CM

## 2020-09-08 DIAGNOSIS — E03.9 HYPOTHYROIDISM, UNSPECIFIED TYPE: ICD-10-CM

## 2020-09-08 DIAGNOSIS — M10.9 GOUT, UNSPECIFIED CAUSE, UNSPECIFIED CHRONICITY, UNSPECIFIED SITE: ICD-10-CM

## 2020-09-08 LAB
ALBUMIN SERPL BCP-MCNC: 4.3 G/DL (ref 3.2–4.9)
ALBUMIN/GLOB SERPL: 2 G/DL
ALP SERPL-CCNC: 69 U/L (ref 30–99)
ALT SERPL-CCNC: 18 U/L (ref 2–50)
ANION GAP SERPL CALC-SCNC: 10 MMOL/L (ref 7–16)
AST SERPL-CCNC: 24 U/L (ref 12–45)
BASOPHILS # BLD AUTO: 0.9 % (ref 0–1.8)
BASOPHILS # BLD: 0.04 K/UL (ref 0–0.12)
BILIRUB SERPL-MCNC: 0.3 MG/DL (ref 0.1–1.5)
BUN SERPL-MCNC: 12 MG/DL (ref 8–22)
CALCIUM SERPL-MCNC: 9.4 MG/DL (ref 8.5–10.5)
CHLORIDE SERPL-SCNC: 94 MMOL/L (ref 96–112)
CO2 SERPL-SCNC: 24 MMOL/L (ref 20–33)
CREAT SERPL-MCNC: 0.7 MG/DL (ref 0.5–1.4)
EOSINOPHIL # BLD AUTO: 0.08 K/UL (ref 0–0.51)
EOSINOPHIL NFR BLD: 1.8 % (ref 0–6.9)
ERYTHROCYTE [DISTWIDTH] IN BLOOD BY AUTOMATED COUNT: 47.9 FL (ref 35.9–50)
FASTING STATUS PATIENT QL REPORTED: NORMAL
GLOBULIN SER CALC-MCNC: 2.2 G/DL (ref 1.9–3.5)
GLUCOSE SERPL-MCNC: 87 MG/DL (ref 65–99)
HCT VFR BLD AUTO: 36.7 % (ref 37–47)
HGB BLD-MCNC: 12 G/DL (ref 12–16)
IMM GRANULOCYTES # BLD AUTO: 0.01 K/UL (ref 0–0.11)
IMM GRANULOCYTES NFR BLD AUTO: 0.2 % (ref 0–0.9)
LYMPHOCYTES # BLD AUTO: 1.78 K/UL (ref 1–4.8)
LYMPHOCYTES NFR BLD: 40.4 % (ref 22–41)
MCH RBC QN AUTO: 31 PG (ref 27–33)
MCHC RBC AUTO-ENTMCNC: 32.7 G/DL (ref 33.6–35)
MCV RBC AUTO: 94.8 FL (ref 81.4–97.8)
MONOCYTES # BLD AUTO: 0.46 K/UL (ref 0–0.85)
MONOCYTES NFR BLD AUTO: 10.4 % (ref 0–13.4)
NEUTROPHILS # BLD AUTO: 2.04 K/UL (ref 2–7.15)
NEUTROPHILS NFR BLD: 46.3 % (ref 44–72)
NRBC # BLD AUTO: 0 K/UL
NRBC BLD-RTO: 0 /100 WBC
PLATELET # BLD AUTO: 273 K/UL (ref 164–446)
PMV BLD AUTO: 10.6 FL (ref 9–12.9)
POTASSIUM SERPL-SCNC: 4.3 MMOL/L (ref 3.6–5.5)
PROT SERPL-MCNC: 6.5 G/DL (ref 6–8.2)
RBC # BLD AUTO: 3.87 M/UL (ref 4.2–5.4)
SODIUM SERPL-SCNC: 128 MMOL/L (ref 135–145)
URATE SERPL-MCNC: 2.7 MG/DL (ref 1.9–8.2)
WBC # BLD AUTO: 4.4 K/UL (ref 4.8–10.8)

## 2020-09-08 PROCEDURE — 80053 COMPREHEN METABOLIC PANEL: CPT

## 2020-09-08 PROCEDURE — 84550 ASSAY OF BLOOD/URIC ACID: CPT

## 2020-09-08 PROCEDURE — 36415 COLL VENOUS BLD VENIPUNCTURE: CPT

## 2020-09-08 PROCEDURE — 77077 JOINT SURVEY SINGLE VIEW: CPT

## 2020-09-08 PROCEDURE — 99214 OFFICE O/P EST MOD 30 MIN: CPT | Performed by: INTERNAL MEDICINE

## 2020-09-08 PROCEDURE — 85025 COMPLETE CBC W/AUTO DIFF WBC: CPT

## 2020-09-08 PROCEDURE — 85652 RBC SED RATE AUTOMATED: CPT

## 2020-09-08 RX ORDER — ALENDRONATE SODIUM 70 MG/1
70 TABLET ORAL
Qty: 12 TAB | Refills: 1 | Status: SHIPPED | OUTPATIENT
Start: 2020-09-08 | End: 2021-02-08

## 2020-09-08 ASSESSMENT — FIBROSIS 4 INDEX: FIB4 SCORE: 0.99

## 2020-09-08 NOTE — PROGRESS NOTES
Chief Complaint- joint pain     Subjective:   Susan Menendez is a 85 y.o. female here today for follow up of rheumatological issues    This is a follow-up visit for this patient who we follow in this clinic predominantly for DJD, osteoporosis and remote history of PMR.  Patient comes in today stating that her podiatrist has diagnosed her with gout although no blood test has been done.  Patient not clear on the symptoms that she had that prompted a diagnosis of gout.  Of note recent sedimentation rate equals 12 February 2020.  Patient denies any vision changes, denies any amaurosis fugax, denies any fevers of unknown etiology, denies any unusual or new rashes.  Patient does have chronic pain in the shoulders secondary to past history of rotator cuff pathology status post surgical intervention right shoulder at [facility] but nothing more than usual.    Also at last visit we had ordered a bone density scan March 2020 for which patient has not done as of yet, looking at epic notes it looks like patient has her bone density scan scheduled for tomorrow.     Since last visit patient had a fall about October 2019 hitting her head resulting in a concussion, patient already status post physical therapy and treatment.  Patient states that she also developed to compression fractures in her T-spine secondary to the fall, patient now being followed by neurosurgery.     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  Additional comorbidities include a diagnosis of autoimmune hepatitis and interstitial cystitis.  Patient also states she had a history of fibromyalgia in the past.  Patient also history of low back surgery with benefit in December 2018.    Addendum 9/21/2020  DEXA 9/9/2020 T scores -4.7 (forearm), -2.4  FRAX 9/9/2020 major osteoporotic fracture risk 26.8%, hip  fracture risk 10.5%        Uric acid 3.4 2/2013  RF neg 2/2013  CCP neg 2/2015  MARLEE neg 2/2015  DEXA 10/2014 T scores -2.3, -1.2  DEXA 11/8/2016 T scores -0.8, -2.3    FRAX 11/8/2016 major osteoporotic fracture risk is 23.8%, hip fracture risk 8.4%  DEXA 3/7/2018 T scores -0.6, -2.1  FRAX 3/7/2018 major osteoporotic fracture risk 26.1%, hip fracture risk 9.5%  Patient has been on Fosamax since March 2018  MRI LS spine 10/2016-indicates DJD and mild to moderate neural foraminal narrowing   Left shoulder x-rays 7/2019-   Impression         1.  Mild degenerative change of LEFT shoulder.  2.  No fracture or dislocation.      Right shoulder x-rays 7/2019 -  Impression         1.  Moderate right acromioclavicular osteoarthrosis.  2.  Mild glenohumeral osteoarthrosis.  3.  No acute fracture or dislocation.           Current medicines (including changes today)  Current Outpatient Medications   Medication Sig Dispense Refill   • alendronate (FOSAMAX) 70 MG Tab Take 1 Tab by mouth every 7 days. On Wed 12 Tab 1   • tamsulosin (FLOMAX) 0.4 MG capsule Take 0.4 mg by mouth ONE-HALF HOUR AFTER BREAKFAST.     • Testosterone 20 % Cream by Does not apply route.     • docusate sodium 100 MG Cap Take 100 mg by mouth 2 Times a Day. 60 Cap    • thyroid (ARMOUR THYROID) 60 MG Tab Take 60 mg by mouth every day.     • estradiol (ESTRACE) 0.1 MG/GM vaginal cream Insert 1 g in vagina every evening.     • Cholecalciferol (VITAMIN D) 2000 units Cap Take 2,000 Units by mouth every day.     • acetaminophen (TYLENOL) 500 MG Tab Take 1,500 mg by mouth every 6 hours as needed for Moderate Pain.     • Probiotic Product (ALIGN) Cap Take 1 Cap by mouth every day.     • duloxetine (CYMBALTA) 60 MG CPEP Take 120 mg by mouth every day.     • trazodone (DESYREL) 100 MG TABS Take 50 mg by mouth every evening.     • Multiple Vitamins-Minerals (EYE VITAMINS PO) Take 1 Tab by mouth every day.     • LYSINE PO Take 1 Tab by mouth every day.     • pentosan  (ELMIRON) 100 MG CAPS Take 100 mg by mouth every day.     • diclofenac EC (VOLTAREN) 50 MG Tablet Delayed Response 1 tab p.o. twice daily to be taken with food as needed joint pain (Patient not taking: Reported on 2/25/2020) 60 Tab 2   • ciprofloxacin (CIPRO) 500 MG Tab Take 1 Tab by mouth 2 times a day. (Patient not taking: Reported on 2/25/2020) 10 Tab 0   • tizanidine (ZANAFLEX) 4 MG Tab Take 1 Tab by mouth 3 times a day as needed. (Patient not taking: Reported on 6/14/2019) 30 Tab 0   • polyethyl glycol-propyl glycol (SYSTANE) 0.4-0.3 % Solution Place 1 Drop in both eyes 2 Times a Day.     • methenamine hip (HIPPREX) 1 GM Tab Take 1 g by mouth every day.     • liothyronine (CYTOMEL) 5 MCG Tab Take 5 mcg by mouth every day.     • gabapentin (NEURONTIN) 300 MG Cap Take 300 mg by mouth 3 times a day.     • Multiple Vitamins-Minerals (BONE SMART PO) Take 1 Tab by mouth every day.       No current facility-administered medications for this visit.      She  has a past medical history of Anemia, Anesthesia, Arthritis (02/06/2018), Autoimmune hepatitis (HCC), Chronic diarrhea, Chronic UTI, Depression, Hiatus hernia syndrome, History of cataract surgery, Hypothyroid, Interstitial cystitis, Other specified disorder of intestines, Pain, Pneumonia, Urinary bladder disorder, and Varicose veins.    ROS   Other than what is mentioned in HPI or physical exam, there is no history of headaches, double vision or blurred vision. No temporal tenderness or jaw claudication. No trouble swallowing difficulties or sore throats.  No chest complaints including chest pain, cough or sputum production. No GI complaints including nausea, vomiting, change in bowel habits, or past peptic ulcer disease. No history of blood in the stools. No urinary complaints including dysuria or frequency. No history of alopecia, photosensitivity, oral ulcerations, Raynaud's phenomena.       Objective:     /70   Pulse 84   Temp 36.2 °C (97.1 °F)  (Temporal)   Resp 12   Wt 60.8 kg (134 lb)   SpO2 97%  Body mass index is 20.99 kg/m².   Physical Exam:    Constitutional: Alert and oriented X3, patient is talkative with good eye contact.Skin: Warm, dry, good turgor, no rashes in visible areas, patient does have dystrophic toenails, did not appreciate any psoriasiform type rashes on the skin.Eye: Equal, round and reactive, conjunctiva clear, lids normal EOM intactENMT: Lips without lesions, good dentition, no oropharyngeal ulcers, moist buccal mucosa, pinna without deformityNeck: Trachea midline, no masses, no thyromegaly.Lymph:  No cervical lymphadenopathy, no axillary lymphadenopathy, no supraclavicular lymphadenopathyRespiratory: Unlabored respiratory effort, lungs clear to auscultation, no wheezes, no ronchi.Cardiovascular: Normal S1, S2, no murmur, no edema.Abdomen: Soft, non-tender, no masses, no hepatosplenomegaly.Psych: Alert and oriented x3, normal affect and mood.Neuro: Cranial nerves 2-12 are grossly intact, no loss of sensation LEExt:no joint laxity noted in bilateral arms, no joint laxity noted in bilateral legs full range of motion of both shoulders without limitations, there is poor muscular architecture both shoulders, no flexion contractures in the elbows no nodules no tophi, knees with mild crepitus but no effusions, toes without crossover toes no splay toes,    Lab Results   Component Value Date/Time    SODIUM 132 (L) 02/26/2020 01:49 PM    POTASSIUM 4.2 02/26/2020 01:49 PM    CHLORIDE 101 02/26/2020 01:49 PM    CO2 23 02/26/2020 01:49 PM    GLUCOSE 116 (H) 02/26/2020 01:49 PM    BUN 23 (H) 02/26/2020 01:49 PM    CREATININE 0.77 07/28/2020 01:24 PM    CREATININE 0.9 01/04/2005 12:05 PM      Lab Results   Component Value Date/Time    WBC 7.0 02/26/2020 01:49 PM    RBC 3.94 (L) 02/26/2020 01:49 PM    HEMOGLOBIN 12.1 02/26/2020 01:49 PM    HEMATOCRIT 35.8 (L) 02/26/2020 01:49 PM    MCV 90.9 02/26/2020 01:49 PM    MCH 30.7 02/26/2020 01:49 PM     MCHC 33.8 02/26/2020 01:49 PM    MPV 10.1 02/26/2020 01:49 PM    NEUTSPOLYS 66.30 02/26/2020 01:49 PM    LYMPHOCYTES 23.10 02/26/2020 01:49 PM    MONOCYTES 8.00 02/26/2020 01:49 PM    EOSINOPHILS 1.40 02/26/2020 01:49 PM    BASOPHILS 0.90 02/26/2020 01:49 PM      Lab Results   Component Value Date/Time    CALCIUM 9.2 02/26/2020 01:49 PM    ASTSGOT 14 02/26/2020 01:49 PM    ALTSGPT 12 02/26/2020 01:49 PM    ALKPHOSPHAT 66 02/26/2020 01:49 PM    TBILIRUBIN 0.4 02/26/2020 01:49 PM    ALBUMIN 4.0 02/26/2020 01:49 PM    ALBUMIN 4.29 03/05/2013 09:00 AM    TOTPROTEIN 6.6 02/26/2020 01:49 PM    TOTPROTEIN 7.60 03/05/2013 09:00 AM     Lab Results   Component Value Date/Time    URICACID 3.7 03/04/2017 09:45 AM    RHEUMFACTN 8 02/27/2013 10:56 AM    CCPANTIBODY 3 02/27/2015 08:44 AM    ANTINUCAB None Detected 02/27/2015 08:44 AM     Lab Results   Component Value Date/Time    SEDRATEWES 12 02/26/2020 01:49 PM     Lab Results   Component Value Date/Time    HBA1C 5.5 05/19/2014 09:25 AM     Lab Results   Component Value Date/Time    CPKTOTAL 84 11/30/2016 10:32 AM     Results for orders placed during the hospital encounter of 03/06/18   DS-BONE DENSITY STUDY (DEXA)    Impression According to the World Health Organization classification, bone mineral density of this patient is osteopenic.        10-year Probability of Fracture:  Major Osteoporotic     26.1%  Hip     9.5%  Population      [place] ()    Based on left femur neck BMD          INTERPRETING LOCATION:  [address] [place] [address], [id number]     Results for orders placed during the hospital encounter of 06/16/20   DX-FOOT-COMPLETE 3+ RIGHT    Impression 1.  There is mild degenerative change in the DIP joints of the 2nd through 5th digits.  2.  There is degenerative change of the 1st MTP joint with hallux valgus deformity.     Results for orders placed during the hospital encounter of 05/18/16   DX-WRIST-COMPLETE 3+ RIGHT    Impression Degenerative changes of the wrist  and base of the thumb without definite acute osseous abnormality.                    Results for orders placed during the hospital encounter of 07/05/18   MR-LUMBAR SPINE-W/O    Impression 1.  No significant change in foraminal greater than lateral recess stenoses, greatest stenoses are moderate foraminal at L5/S1 on the right, L1/2 on the left.  2.  3.  Severe facet arthropathy greater than degenerative disc disease    4.  Worsening discogenic edema at several levels, greatest involvement is still L1/2    5.  Stable several levels of degenerative listhesis     Results for orders placed during the hospital encounter of 03/07/18   MR-CERVICAL SPINE-W/O    Impression 1.  C3-C4 marked Modic type I discogenic endplate marrow edema. Associated Schmorl's node defect at the C3 inferior endplate. This can be a pain generator.  2.  Mild degenerative anterolisthesis C3 on C4 and retrolisthesis C5 on C6.  3.  C3-C4 mild central stenosis due to disc-osteophyte complex and ligamentum flavum buckling or hypertrophy. Mild right foraminal stenosis. Mild left-sided hypertrophic facet arthropathy. Moderate left foraminal stenosis.  4.  C4-5 mild left-sided hypertrophic facet arthropathy.  5.  C5-6 degenerative disc space narrowing. Small disc/osteophyte complex accentuated by retrolisthesis. Facet arthropathy. Borderline bilateral foraminal stenosis.  6.  C6-C7 minimal disc/osteophyte complex. Mild bilateral facet arthropathy. Mild right foraminal stenosis due to uncinate spondylosis.  7.  No myelopathic cord signal abnormality at any cervical level.     Results for orders placed during the hospital encounter of 06/09/10   DX-CERVICAL SPINE-2 OR 3 VIEWS     Assessment and Plan:     1. Primary osteoarthritis involving multiple joints  Patient uses Tylenol as needed, we can do corticosteroid injections as needed  - URIC ACID, SERUM  - Sed Rate; Future  - CBC WITH DIFFERENTIAL; Future  - Comp Metabolic Panel; Future  - DX-JOINT  SURVEY-HANDS SINGLE VIEW; Future  - alendronate (FOSAMAX) 70 MG Tab; Take 1 Tab by mouth every 7 days. On Wed  Dispense: 12 Tab; Refill: 1    2. Gout, unspecified cause, unspecified chronicity, unspecified site  Diagnosed by patient's podiatrist without any labs, today we will check uric acid level    3. Psoriasis  Query if patient may have psoriasis in toenails?  Patient states there is psoriasis in her father, today we will do bilateral hand x-rays for evaluation of any erosive inflammatory arthritis compatible with psoriatic arthritis  - DX-JOINT SURVEY-HANDS SINGLE VIEW; Future    4. Polymyalgia (HCC)  Clinically stable, today check ESR  - URIC ACID, SERUM  - Sed Rate; Future  - CBC WITH DIFFERENTIAL; Future  - Comp Metabolic Panel; Future  - DX-JOINT SURVEY-HANDS SINGLE VIEW; Future  - alendronate (FOSAMAX) 70 MG Tab; Take 1 Tab by mouth every 7 days. On Wed  Dispense: 12 Tab; Refill: 1    5. Senile osteoporosis  Patient scheduled for DEXA tomorrow, currently on Fosamax 70 mg p.o. weekly since March 2018.  Fosamax refilled for patient today   continue calcium citrate 1200 mg by mouth daily and vitamin D about 2000 units by mouth daily and magnesium 200 mg by mouth daily  - URIC ACID, SERUM  - Sed Rate; Future  - CBC WITH DIFFERENTIAL; Future  - Comp Metabolic Panel; Future  - DX-JOINT SURVEY-HANDS SINGLE VIEW; Future  - alendronate (FOSAMAX) 70 MG Tab; Take 1 Tab by mouth every 7 days. On Wed  Dispense: 12 Tab; Refill: 1    6. Long term use of alendronate therapy (Fosamax)  Currently on Fosamax 70 mg p.o. weekly, patient needs monitoring labs every 6 months, patient due for labs labs ordered for patient  - URIC ACID, SERUM  - Sed Rate; Future  - CBC WITH DIFFERENTIAL; Future  - Comp Metabolic Panel; Future  - DX-JOINT SURVEY-HANDS SINGLE VIEW; Future  - alendronate (FOSAMAX) 70 MG Tab; Take 1 Tab by mouth every 7 days. On Wed  Dispense: 12 Tab; Refill: 1    7. Hypothyroidism, unspecified type  Can exacerbate  joint pain, I recommend monitoring thyroid on a regular basis to assure it is not contributing to the patient's joint pain  - URIC ACID, SERUM  - Sed Rate; Future  - CBC WITH DIFFERENTIAL; Future  - Comp Metabolic Panel; Future    Followup: Return in about 3 months (around 12/8/2020). or sooner nupur Menendez  was seen 30 minutes face-to-face of which more than 50% of the time was spent counseling the patient (excluding time for procedures)  regarding  rheumatological condition and care. Therapy was discussed in detail.      Please note that this dictation was created using voice recognition software. I have made every reasonable attempt to correct obvious errors, but I expect that there are errors of grammar and possibly content that I did not discover before finalizing the note.

## 2020-09-09 ENCOUNTER — HOSPITAL ENCOUNTER (OUTPATIENT)
Dept: RADIOLOGY | Facility: MEDICAL CENTER | Age: 85
End: 2020-09-09
Attending: INTERNAL MEDICINE
Payer: MEDICARE

## 2020-09-09 DIAGNOSIS — M15.9 PRIMARY OSTEOARTHRITIS INVOLVING MULTIPLE JOINTS: ICD-10-CM

## 2020-09-09 DIAGNOSIS — M81.0 SENILE OSTEOPOROSIS: ICD-10-CM

## 2020-09-09 DIAGNOSIS — Z12.31 VISIT FOR SCREENING MAMMOGRAM: ICD-10-CM

## 2020-09-09 DIAGNOSIS — M35.3 POLYMYALGIA (HCC): ICD-10-CM

## 2020-09-09 LAB — ERYTHROCYTE [SEDIMENTATION RATE] IN BLOOD BY WESTERGREN METHOD: 5 MM/HOUR (ref 0–30)

## 2020-09-09 PROCEDURE — 77067 SCR MAMMO BI INCL CAD: CPT | Mod: 50

## 2020-09-09 PROCEDURE — 77080 DXA BONE DENSITY AXIAL: CPT

## 2020-09-10 ENCOUNTER — TELEPHONE (OUTPATIENT)
Dept: RHEUMATOLOGY | Facility: MEDICAL CENTER | Age: 85
End: 2020-09-10

## 2020-09-10 NOTE — TELEPHONE ENCOUNTER
Please notify patient that we received the results of her blood tests and she does not have gout, recommend to tell her podiatrist that her gout test was negative.    Additionally, patient sodium is a bit low at 128, Cymbalta may be causing that, highly recommend to talk to her primary care doctor about checking her sodium may be adjusting her Cymbalta.

## 2020-09-23 ENCOUNTER — HOSPITAL ENCOUNTER (OUTPATIENT)
Dept: RADIOLOGY | Facility: MEDICAL CENTER | Age: 85
End: 2020-09-23
Attending: PODIATRIST
Payer: MEDICARE

## 2020-09-23 DIAGNOSIS — M79.671 RIGHT FOOT PAIN: ICD-10-CM

## 2020-09-23 PROCEDURE — 73700 CT LOWER EXTREMITY W/O DYE: CPT | Mod: RT

## 2020-12-08 ENCOUNTER — OFFICE VISIT (OUTPATIENT)
Dept: RHEUMATOLOGY | Facility: MEDICAL CENTER | Age: 85
End: 2020-12-08
Payer: MEDICARE

## 2020-12-08 VITALS
OXYGEN SATURATION: 90 % | SYSTOLIC BLOOD PRESSURE: 130 MMHG | BODY MASS INDEX: 21.3 KG/M2 | TEMPERATURE: 97.3 F | DIASTOLIC BLOOD PRESSURE: 60 MMHG | HEART RATE: 84 BPM | WEIGHT: 136 LBS | RESPIRATION RATE: 14 BRPM

## 2020-12-08 DIAGNOSIS — M15.9 PRIMARY OSTEOARTHRITIS INVOLVING MULTIPLE JOINTS: ICD-10-CM

## 2020-12-08 DIAGNOSIS — E03.9 HYPOTHYROIDISM, UNSPECIFIED TYPE: ICD-10-CM

## 2020-12-08 DIAGNOSIS — Z79.83 LONG TERM USE OF ALENDRONATE THERAPY (FOSAMAX): ICD-10-CM

## 2020-12-08 DIAGNOSIS — M35.3 POLYMYALGIA (HCC): ICD-10-CM

## 2020-12-08 DIAGNOSIS — M81.0 SENILE OSTEOPOROSIS: ICD-10-CM

## 2020-12-08 PROCEDURE — 99214 OFFICE O/P EST MOD 30 MIN: CPT | Performed by: INTERNAL MEDICINE

## 2020-12-08 ASSESSMENT — FIBROSIS 4 INDEX: FIB4 SCORE: 1.72

## 2020-12-08 NOTE — PROGRESS NOTES
Chief Complaint- joint pain     Subjective:   Susan Menendez is a 85 y.o. female here today for follow up of rheumatological issues    This is a follow-up visit for this patient who we see in this clinic predominantly for DJD, osteoporosis and remote history of PMR.  Patient comes in today for follow-up, states that overall she is feeling well, denies any fevers of unknown etiology, denies any fractures, denies any rashes.  Patient denies any amaurosis fugax, denies any temporal headaches.  Of note recent sedimentation rate equals 5 September 2020.     Patient does have chronic pain in the shoulders secondary to past history of rotator cuff pathology status post surgical intervention right shoulder at [facility] but nothing more than usual.      Patient had a fall about October 2019 hitting her head resulting in a concussion, patient already status post physical therapy and treatment.  Patient states that she also developed to compression fractures in her T-spine secondary to the fall, patient now being followed by neurosurgery.     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  Additional comorbidities include a diagnosis of autoimmune hepatitis and interstitial cystitis.  Patient also states she had a history of fibromyalgia in the past.  Patient also history of low back surgery with benefit in December 2018.       Uric acid 3.4 2/2013; Uric acid 2.7 9/2020  RF neg 2/2013  CCP neg 2/2015  MARLEE neg 2/2015  DEXA 10/2014 T scores -2.3, -1.2  DEXA 11/8/2016 T scores -0.8, -2.3    FRAX 11/8/2016 major osteoporotic fracture risk is 23.8%, hip fracture risk 8.4%  DEXA 3/7/2018 T scores -0.6, -2.1  FRAX 3/7/2018 major osteoporotic fracture risk 26.1%, hip fracture risk 9.5%  DEXA 9/9/2020 T scores -4.7 (forearm), -2.4  FRAX 9/9/2020 major osteoporotic fracture risk 26.8%,  hip fracture risk 10.5%   Patient has been on Fosamax since March 2018  Hand x-rays 9/2020-indicates  DJD  MRI LS spine 10/2016-indicates DJD and mild to moderate neural foraminal narrowing   Left shoulder x-rays 7/2019-   Impression         1.  Mild degenerative change of LEFT shoulder.  2.  No fracture or dislocation.      Right shoulder x-rays 7/2019 -  Impression         1.  Moderate right acromioclavicular osteoarthrosis.  2.  Mild glenohumeral osteoarthrosis.  3.  No acute fracture or dislocation.           Current medicines (including changes today)  Current Outpatient Medications   Medication Sig Dispense Refill   • alendronate (FOSAMAX) 70 MG Tab Take 1 Tab by mouth every 7 days. On Wed 12 Tab 1   • tamsulosin (FLOMAX) 0.4 MG capsule Take 0.4 mg by mouth ONE-HALF HOUR AFTER BREAKFAST.     • docusate sodium 100 MG Cap Take 100 mg by mouth 2 Times a Day. 60 Cap    • thyroid (ARMOUR THYROID) 60 MG Tab Take 60 mg by mouth every day.     • estradiol (ESTRACE) 0.1 MG/GM vaginal cream Insert 1 g in vagina every evening.     • polyethyl glycol-propyl glycol (SYSTANE) 0.4-0.3 % Solution Place 1 Drop in both eyes 2 Times a Day.     • Cholecalciferol (VITAMIN D) 2000 units Cap Take 2,000 Units by mouth every day.     • acetaminophen (TYLENOL) 500 MG Tab Take 1,500 mg by mouth every 6 hours as needed for Moderate Pain.     • Probiotic Product (ALIGN) Cap Take 1 Cap by mouth every day.     • duloxetine (CYMBALTA) 60 MG CPEP Take 120 mg by mouth every day.     • trazodone (DESYREL) 100 MG TABS Take 50 mg by mouth every evening.     • Multiple Vitamins-Minerals (EYE VITAMINS PO) Take 1 Tab by mouth every day.     • LYSINE PO Take 1 Tab by mouth every day.     • pentosan (ELMIRON) 100 MG CAPS Take 100 mg by mouth every day.     • diclofenac EC (VOLTAREN) 50 MG Tablet Delayed Response 1 tab p.o. twice daily to be taken with food as needed joint pain (Patient not taking: Reported on 2/25/2020) 60 Tab 2   • ciprofloxacin  (CIPRO) 500 MG Tab Take 1 Tab by mouth 2 times a day. (Patient not taking: Reported on 2/25/2020) 10 Tab 0   • Testosterone 20 % Cream by Does not apply route.     • tizanidine (ZANAFLEX) 4 MG Tab Take 1 Tab by mouth 3 times a day as needed. (Patient not taking: Reported on 6/14/2019) 30 Tab 0   • methenamine hip (HIPPREX) 1 GM Tab Take 1 g by mouth every day.     • liothyronine (CYTOMEL) 5 MCG Tab Take 5 mcg by mouth every day.     • gabapentin (NEURONTIN) 300 MG Cap Take 300 mg by mouth 3 times a day.     • Multiple Vitamins-Minerals (BONE SMART PO) Take 1 Tab by mouth every day.       No current facility-administered medications for this visit.      She  has a past medical history of Anemia, Anesthesia, Arthritis (02/06/2018), Autoimmune hepatitis (HCC), Chronic diarrhea, Chronic UTI, Depression, Hiatus hernia syndrome, History of cataract surgery, Hypothyroid, Interstitial cystitis, Other specified disorder of intestines, Pain, Pneumonia, Urinary bladder disorder, and Varicose veins.    ROS   Other than what is mentioned in HPI or physical exam, there is no history of headaches, double vision or blurred vision. No temporal tenderness or jaw claudication. No trouble swallowing difficulties or sore throats.  No chest complaints including chest pain, cough or sputum production. No GI complaints including nausea, vomiting, change in bowel habits, or past peptic ulcer disease. No history of blood in the stools. No urinary complaints including dysuria or frequency. No history of alopecia, photosensitivity, oral ulcerations, Raynaud's phenomena.       Objective:     /60   Pulse 84   Temp 36.3 °C (97.3 °F) (Temporal)   Resp 14   Wt 61.7 kg (136 lb)   SpO2 90%  Body mass index is 21.3 kg/m².   Physical Exam:    Constitutional: Alert and oriented X3, patient is talkative with good eye contact.Skin: Warm, dry, good turgor, no rashes in visible areas.Eye: Equal, round and reactive, conjunctiva clear, lids normal  EOM intactENMT: Lips without lesions, good dentition, no oropharyngeal ulcers, moist buccal mucosa, pinna without deformityNeck: Trachea midline, no masses, no thyromegaly.Lymph:  No cervical lymphadenopathy, no axillary lymphadenopathy, no supraclavicular lymphadenopathyRespiratory: Unlabored respiratory effort, lungs clear to auscultation, no wheezes, no ronchi.Cardiovascular: Normal S1, S2, no murmur, no edema.Abdomen: Soft, non-tender, no masses, no hepatosplenomegaly.Psych: Alert and oriented x3, normal affect and mood.Neuro: Cranial nerves 2-12 are grossly intact, no loss of sensation LEExt:no joint laxity noted in bilateral arms, no joint laxity noted in bilateral legs, poor muscular architecture both shoulders, no flexion contractures in the elbows no nodules no tophi, knees with crepitus but no effusions, toes without crossover toes and without splay toes, Heberden's nodes in the DIP joints of both hands but no synovitis    Lab Results   Component Value Date/Time    SODIUM 128 (L) 09/08/2020 12:56 PM    POTASSIUM 4.3 09/08/2020 12:56 PM    CHLORIDE 94 (L) 09/08/2020 12:56 PM    CO2 24 09/08/2020 12:56 PM    GLUCOSE 87 09/08/2020 12:56 PM    BUN 12 09/08/2020 12:56 PM    CREATININE 0.70 09/08/2020 12:56 PM    CREATININE 0.9 01/04/2005 12:05 PM      Lab Results   Component Value Date/Time    WBC 4.4 (L) 09/08/2020 12:56 PM    RBC 3.87 (L) 09/08/2020 12:56 PM    HEMOGLOBIN 12.0 09/08/2020 12:56 PM    HEMATOCRIT 36.7 (L) 09/08/2020 12:56 PM    MCV 94.8 09/08/2020 12:56 PM    MCH 31.0 09/08/2020 12:56 PM    MCHC 32.7 (L) 09/08/2020 12:56 PM    MPV 10.6 09/08/2020 12:56 PM    NEUTSPOLYS 46.30 09/08/2020 12:56 PM    LYMPHOCYTES 40.40 09/08/2020 12:56 PM    MONOCYTES 10.40 09/08/2020 12:56 PM    EOSINOPHILS 1.80 09/08/2020 12:56 PM    BASOPHILS 0.90 09/08/2020 12:56 PM      Lab Results   Component Value Date/Time    CALCIUM 9.4 09/08/2020 12:56 PM    ASTSGOT 24 09/08/2020 12:56 PM    ALTSGPT 18 09/08/2020 12:56 PM     ALKPHOSPHAT 69 09/08/2020 12:56 PM    TBILIRUBIN 0.3 09/08/2020 12:56 PM    ALBUMIN 4.3 09/08/2020 12:56 PM    ALBUMIN 4.29 03/05/2013 09:00 AM    TOTPROTEIN 6.5 09/08/2020 12:56 PM    TOTPROTEIN 7.60 03/05/2013 09:00 AM     Lab Results   Component Value Date/Time    URICACID 2.7 09/08/2020 12:56 PM    RHEUMFACTN 8 02/27/2013 10:56 AM    CCPANTIBODY 3 02/27/2015 08:44 AM    ANTINUCAB None Detected 02/27/2015 08:44 AM     Lab Results   Component Value Date/Time    SEDRATEWES 5 09/08/2020 12:56 PM     Lab Results   Component Value Date/Time    HBA1C 5.5 05/19/2014 09:25 AM     Lab Results   Component Value Date/Time    CPKTOTAL 84 11/30/2016 10:32 AM     Results for orders placed during the hospital encounter of 09/08/20   DX-JOINT SURVEY-HANDS SINGLE VIEW    Impression 1.  Productive arthropathy is most pronounced in the triscaphe and carpometacarpal joints. Milder changes are in the interphalangeal joints. Findings are suggestive of osteoarthritis. No discrete erosions are identified.    2.  Osteopenia.    3.  Nonspecific chondrocalcinosis, possibly degenerative change or secondary to crystalline-induced arthropathy.     Results for orders placed during the hospital encounter of 09/09/20   DS-BONE DENSITY STUDY (DEXA)    Impression According to the World Health Organization classification, bone mineral density of this patient is consistent with osteoporosis of the left forearm and osteopenia in the proximal left femur.        10-year Probability of Fracture:  Major Osteoporotic     26.8%  Hip     10.5%  Population      [place] ()    Based on left femur neck BMD          INTERPRETING LOCATION:  [address] [place] [address], [id number]     Results for orders placed during the hospital encounter of 06/16/20   DX-FOOT-COMPLETE 3+ RIGHT    Impression 1.  There is mild degenerative change in the DIP joints of the 2nd through 5th digits.  2.  There is degenerative change of the 1st MTP joint with hallux valgus  deformity.     Results for orders placed during the hospital encounter of 07/03/19   DX-SHOULDER 2+ LEFT    Impression 1.  Mild degenerative change of LEFT shoulder.  2.  No fracture or dislocation.     Results for orders placed during the hospital encounter of 06/09/10   DX-HAND 3+     Results for orders placed during the hospital encounter of 05/18/16   DX-WRIST-COMPLETE 3+ RIGHT    Impression Degenerative changes of the wrist and base of the thumb without definite acute osseous abnormality.     Results for orders placed during the hospital encounter of 07/05/18   MR-LUMBAR SPINE-W/O    Impression 1.  No significant change in foraminal greater than lateral recess stenoses, greatest stenoses are moderate foraminal at L5/S1 on the right, L1/2 on the left.  2.  3.  Severe facet arthropathy greater than degenerative disc disease    4.  Worsening discogenic edema at several levels, greatest involvement is still L1/2    5.  Stable several levels of degenerative listhesis     Results for orders placed during the hospital encounter of 03/07/18   MR-CERVICAL SPINE-W/O    Impression 1.  C3-C4 marked Modic type I discogenic endplate marrow edema. Associated Schmorl's node defect at the C3 inferior endplate. This can be a pain generator.  2.  Mild degenerative anterolisthesis C3 on C4 and retrolisthesis C5 on C6.  3.  C3-C4 mild central stenosis due to disc-osteophyte complex and ligamentum flavum buckling or hypertrophy. Mild right foraminal stenosis. Mild left-sided hypertrophic facet arthropathy. Moderate left foraminal stenosis.  4.  C4-5 mild left-sided hypertrophic facet arthropathy.  5.  C5-6 degenerative disc space narrowing. Small disc/osteophyte complex accentuated by retrolisthesis. Facet arthropathy. Borderline bilateral foraminal stenosis.  6.  C6-C7 minimal disc/osteophyte complex. Mild bilateral facet arthropathy. Mild right foraminal stenosis due to uncinate spondylosis.  7.  No myelopathic cord signal  abnormality at any cervical level.      Results for orders placed during the hospital encounter of 06/09/10   DX-CERVICAL SPINE-2 OR 3 VIEWS     Assessment and Plan:     1. Primary osteoarthritis involving multiple joints  Patient uses Tylenol as needed, we can do corticosteroid injections as needed    2. Polymyalgia (HCC)  In remission recent sedimentation rate equals 5, will continue to monitor    3. Senile osteoporosis  Last DEXA September 2020 Next DEXA September 2022, patient currently on Fosamax 70 mg p.o. weekly   continue calcium citrate 1200 mg by mouth daily and vitamin D about 2000 units by mouth daily and magnesium 200 mg by mouth daily    4. Long term use of alendronate therapy (Fosamax)  On Fosamax 70 mg p.o. weekly, patient needs monitoring labs every 6 months, next labs will be due about March 2021 will order at next visit    5. Hypothyroidism, unspecified type  Can exacerbate joint pain, I recommend monitoring thyroid on a regular basis to assure it is not contributing to the patient's joint pain    Followup: Return in about 6 months (around 6/8/2021). or sooner nupur Menendez  was seen 30 minutes face-to-face of which more than 50% of the time was spent counseling the patient (excluding time for procedures)  regarding  rheumatological condition and care. Therapy was discussed in detail.      Please note that this dictation was created using voice recognition software. I have made every reasonable attempt to correct obvious errors, but I expect that there are errors of grammar and possibly content that I did not discover before finalizing the note.

## 2021-01-14 DIAGNOSIS — Z23 NEED FOR VACCINATION: ICD-10-CM

## 2021-02-08 ENCOUNTER — HOSPITAL ENCOUNTER (OUTPATIENT)
Dept: LAB | Facility: MEDICAL CENTER | Age: 86
End: 2021-02-08
Attending: NURSE PRACTITIONER
Payer: MEDICARE

## 2021-02-08 LAB — CANCER AG125 SERPL-ACNC: 12 U/ML (ref 0–35)

## 2021-02-08 PROCEDURE — 86304 IMMUNOASSAY TUMOR CA 125: CPT | Mod: GA

## 2021-02-08 PROCEDURE — 36415 COLL VENOUS BLD VENIPUNCTURE: CPT | Mod: GA

## 2021-03-05 ENCOUNTER — HOSPITAL ENCOUNTER (OUTPATIENT)
Dept: RADIOLOGY | Facility: MEDICAL CENTER | Age: 86
End: 2021-03-05
Attending: OBSTETRICS & GYNECOLOGY
Payer: MEDICARE

## 2021-03-05 DIAGNOSIS — R14.0 GASTRIC TYMPANY: ICD-10-CM

## 2021-03-05 DIAGNOSIS — Z80.41 FAMILY HISTORY OF MALIGNANT NEOPLASM OF OVARY: ICD-10-CM

## 2021-03-05 PROCEDURE — 76830 TRANSVAGINAL US NON-OB: CPT

## 2021-04-23 ENCOUNTER — HOSPITAL ENCOUNTER (OUTPATIENT)
Dept: RADIOLOGY | Facility: MEDICAL CENTER | Age: 86
End: 2021-04-23
Attending: SURGERY
Payer: MEDICARE

## 2021-04-23 DIAGNOSIS — K59.00 CONSTIPATION, UNSPECIFIED CONSTIPATION TYPE: ICD-10-CM

## 2021-04-23 PROCEDURE — 74018 RADEX ABDOMEN 1 VIEW: CPT

## 2021-04-26 ENCOUNTER — HOSPITAL ENCOUNTER (OUTPATIENT)
Dept: RADIOLOGY | Facility: MEDICAL CENTER | Age: 86
End: 2021-04-26
Attending: SURGERY
Payer: MEDICARE

## 2021-04-26 DIAGNOSIS — K59.00 CONSTIPATION, UNSPECIFIED CONSTIPATION TYPE: ICD-10-CM

## 2021-04-26 PROCEDURE — 74018 RADEX ABDOMEN 1 VIEW: CPT

## 2021-04-28 ENCOUNTER — HOSPITAL ENCOUNTER (OUTPATIENT)
Dept: RADIOLOGY | Facility: MEDICAL CENTER | Age: 86
End: 2021-04-28
Attending: SURGERY
Payer: MEDICARE

## 2021-04-28 DIAGNOSIS — K59.00 CONSTIPATION, UNSPECIFIED CONSTIPATION TYPE: ICD-10-CM

## 2021-04-28 PROCEDURE — 74018 RADEX ABDOMEN 1 VIEW: CPT

## 2021-04-30 ENCOUNTER — HOSPITAL ENCOUNTER (OUTPATIENT)
Dept: RADIOLOGY | Facility: MEDICAL CENTER | Age: 86
End: 2021-04-30
Attending: SURGERY
Payer: MEDICARE

## 2021-04-30 DIAGNOSIS — K59.00 CONSTIPATION, UNSPECIFIED CONSTIPATION TYPE: ICD-10-CM

## 2021-04-30 PROCEDURE — 74018 RADEX ABDOMEN 1 VIEW: CPT

## 2021-05-01 ENCOUNTER — HOSPITAL ENCOUNTER (OUTPATIENT)
Dept: RADIOLOGY | Facility: MEDICAL CENTER | Age: 86
End: 2021-05-01
Attending: SURGERY
Payer: MEDICARE

## 2021-05-01 DIAGNOSIS — K59.00 CONSTIPATION, UNSPECIFIED CONSTIPATION TYPE: ICD-10-CM

## 2021-05-01 PROCEDURE — 74018 RADEX ABDOMEN 1 VIEW: CPT

## 2021-05-25 ENCOUNTER — HOSPITAL ENCOUNTER (OUTPATIENT)
Dept: LAB | Facility: MEDICAL CENTER | Age: 86
End: 2021-05-25
Attending: NURSE PRACTITIONER
Payer: MEDICARE

## 2021-05-25 ENCOUNTER — HOSPITAL ENCOUNTER (OUTPATIENT)
Dept: LAB | Facility: MEDICAL CENTER | Age: 86
End: 2021-05-25
Attending: FAMILY MEDICINE
Payer: MEDICARE

## 2021-05-25 LAB
ALBUMIN SERPL BCP-MCNC: 4.1 G/DL (ref 3.2–4.9)
ALBUMIN/GLOB SERPL: 1.5 G/DL
ALP SERPL-CCNC: 84 U/L (ref 30–99)
ALT SERPL-CCNC: 17 U/L (ref 2–50)
ANION GAP SERPL CALC-SCNC: 9 MMOL/L (ref 7–16)
AST SERPL-CCNC: 17 U/L (ref 12–45)
BASOPHILS # BLD AUTO: 1.6 % (ref 0–1.8)
BASOPHILS # BLD: 0.07 K/UL (ref 0–0.12)
BILIRUB SERPL-MCNC: 0.3 MG/DL (ref 0.1–1.5)
BUN SERPL-MCNC: 15 MG/DL (ref 8–22)
CALCIUM SERPL-MCNC: 9.3 MG/DL (ref 8.5–10.5)
CHLORIDE SERPL-SCNC: 101 MMOL/L (ref 96–112)
CO2 SERPL-SCNC: 23 MMOL/L (ref 20–33)
CREAT SERPL-MCNC: 0.84 MG/DL (ref 0.5–1.4)
EOSINOPHIL # BLD AUTO: 0.05 K/UL (ref 0–0.51)
EOSINOPHIL NFR BLD: 1.1 % (ref 0–6.9)
ERYTHROCYTE [DISTWIDTH] IN BLOOD BY AUTOMATED COUNT: 50.9 FL (ref 35.9–50)
FASTING STATUS PATIENT QL REPORTED: NORMAL
GLOBULIN SER CALC-MCNC: 2.7 G/DL (ref 1.9–3.5)
GLUCOSE SERPL-MCNC: 75 MG/DL (ref 65–99)
HCT VFR BLD AUTO: 37.9 % (ref 37–47)
HGB BLD-MCNC: 12.7 G/DL (ref 12–16)
IMM GRANULOCYTES # BLD AUTO: 0.01 K/UL (ref 0–0.11)
IMM GRANULOCYTES NFR BLD AUTO: 0.2 % (ref 0–0.9)
INR PPP: 0.96 (ref 0.87–1.13)
LYMPHOCYTES # BLD AUTO: 1.52 K/UL (ref 1–4.8)
LYMPHOCYTES NFR BLD: 33.9 % (ref 22–41)
MCH RBC QN AUTO: 31.7 PG (ref 27–33)
MCHC RBC AUTO-ENTMCNC: 33.5 G/DL (ref 33.6–35)
MCV RBC AUTO: 94.5 FL (ref 81.4–97.8)
MONOCYTES # BLD AUTO: 0.45 K/UL (ref 0–0.85)
MONOCYTES NFR BLD AUTO: 10 % (ref 0–13.4)
NEUTROPHILS # BLD AUTO: 2.39 K/UL (ref 2–7.15)
NEUTROPHILS NFR BLD: 53.2 % (ref 44–72)
NRBC # BLD AUTO: 0 K/UL
NRBC BLD-RTO: 0 /100 WBC
PLATELET # BLD AUTO: 303 K/UL (ref 164–446)
PMV BLD AUTO: 10.4 FL (ref 9–12.9)
POTASSIUM SERPL-SCNC: 4.5 MMOL/L (ref 3.6–5.5)
PROT SERPL-MCNC: 6.8 G/DL (ref 6–8.2)
PROTHROMBIN TIME: 13.1 SEC (ref 12–14.6)
RBC # BLD AUTO: 4.01 M/UL (ref 4.2–5.4)
SODIUM SERPL-SCNC: 133 MMOL/L (ref 135–145)
T3 SERPL-MCNC: 74 NG/DL (ref 60–181)
T4 SERPL-MCNC: 6.3 UG/DL (ref 4–12)
TSH SERPL DL<=0.005 MIU/L-ACNC: 3.3 UIU/ML (ref 0.38–5.33)
WBC # BLD AUTO: 4.5 K/UL (ref 4.8–10.8)

## 2021-05-25 PROCEDURE — 84480 ASSAY TRIIODOTHYRONINE (T3): CPT

## 2021-05-25 PROCEDURE — 84443 ASSAY THYROID STIM HORMONE: CPT

## 2021-05-25 PROCEDURE — 80053 COMPREHEN METABOLIC PANEL: CPT

## 2021-05-25 PROCEDURE — 84270 ASSAY OF SEX HORMONE GLOBUL: CPT

## 2021-05-25 PROCEDURE — 85025 COMPLETE CBC W/AUTO DIFF WBC: CPT

## 2021-05-25 PROCEDURE — 36415 COLL VENOUS BLD VENIPUNCTURE: CPT

## 2021-05-25 PROCEDURE — 85610 PROTHROMBIN TIME: CPT

## 2021-05-25 PROCEDURE — 84436 ASSAY OF TOTAL THYROXINE: CPT

## 2021-05-25 PROCEDURE — 84402 ASSAY OF FREE TESTOSTERONE: CPT

## 2021-05-25 PROCEDURE — 84403 ASSAY OF TOTAL TESTOSTERONE: CPT

## 2021-05-31 LAB
SHBG SERPL-SCNC: 77 NMOL/L (ref 30–135)
TESTOST FREE SERPL-MCNC: 1.9 PG/ML (ref 0.6–3.8)
TESTOST SERPL-MCNC: 20 NG/DL (ref 5–32)

## 2021-06-07 ENCOUNTER — OFFICE VISIT (OUTPATIENT)
Dept: URGENT CARE | Facility: PHYSICIAN GROUP | Age: 86
End: 2021-06-07
Payer: MEDICARE

## 2021-06-07 VITALS
DIASTOLIC BLOOD PRESSURE: 76 MMHG | HEART RATE: 78 BPM | BODY MASS INDEX: 21.5 KG/M2 | RESPIRATION RATE: 14 BRPM | SYSTOLIC BLOOD PRESSURE: 128 MMHG | OXYGEN SATURATION: 93 % | TEMPERATURE: 98 F | WEIGHT: 137 LBS | HEIGHT: 67 IN

## 2021-06-07 DIAGNOSIS — N30.10 CHRONIC INTERSTITIAL CYSTITIS: ICD-10-CM

## 2021-06-07 PROCEDURE — 99213 OFFICE O/P EST LOW 20 MIN: CPT | Performed by: PHYSICIAN ASSISTANT

## 2021-06-07 ASSESSMENT — FIBROSIS 4 INDEX: FIB4 SCORE: 1.13

## 2021-06-08 ENCOUNTER — APPOINTMENT (OUTPATIENT)
Dept: RHEUMATOLOGY | Facility: MEDICAL CENTER | Age: 86
End: 2021-06-08
Payer: MEDICARE

## 2021-06-08 ENCOUNTER — HOSPITAL ENCOUNTER (OUTPATIENT)
Dept: RADIOLOGY | Facility: MEDICAL CENTER | Age: 86
End: 2021-06-08
Attending: INTERNAL MEDICINE
Payer: MEDICARE

## 2021-06-08 DIAGNOSIS — R14.0 GASTRIC TYMPANY: ICD-10-CM

## 2021-06-08 DIAGNOSIS — K59.01 SLOW TRANSIT CONSTIPATION: ICD-10-CM

## 2021-06-08 DIAGNOSIS — A04.9 BACTERIAL ENTERITIS: ICD-10-CM

## 2021-06-08 DIAGNOSIS — R11.0 NAUSEA: ICD-10-CM

## 2021-06-08 PROCEDURE — A9541 TC99M SULFUR COLLOID: HCPCS

## 2021-06-10 ENCOUNTER — OFFICE VISIT (OUTPATIENT)
Dept: RHEUMATOLOGY | Facility: MEDICAL CENTER | Age: 86
End: 2021-06-10
Payer: MEDICARE

## 2021-06-10 VITALS
HEART RATE: 78 BPM | SYSTOLIC BLOOD PRESSURE: 160 MMHG | DIASTOLIC BLOOD PRESSURE: 72 MMHG | TEMPERATURE: 96.9 F | WEIGHT: 140 LBS | OXYGEN SATURATION: 96 % | RESPIRATION RATE: 14 BRPM | BODY MASS INDEX: 21.93 KG/M2

## 2021-06-10 DIAGNOSIS — M81.0 SENILE OSTEOPOROSIS: ICD-10-CM

## 2021-06-10 DIAGNOSIS — M35.3 POLYMYALGIA (HCC): ICD-10-CM

## 2021-06-10 DIAGNOSIS — E03.9 HYPOTHYROIDISM, UNSPECIFIED TYPE: ICD-10-CM

## 2021-06-10 DIAGNOSIS — M15.9 PRIMARY OSTEOARTHRITIS INVOLVING MULTIPLE JOINTS: ICD-10-CM

## 2021-06-10 DIAGNOSIS — K75.4 AUTOIMMUNE HEPATITIS (HCC): ICD-10-CM

## 2021-06-10 DIAGNOSIS — Z79.83 LONG TERM USE OF ALENDRONATE THERAPY (FOSAMAX): ICD-10-CM

## 2021-06-10 PROCEDURE — 99214 OFFICE O/P EST MOD 30 MIN: CPT | Performed by: INTERNAL MEDICINE

## 2021-06-10 RX ORDER — CYCLOSPORINE 0.5 MG/ML
1 EMULSION OPHTHALMIC 2 TIMES DAILY
COMMUNITY
End: 2022-11-11

## 2021-06-10 RX ORDER — ALENDRONATE SODIUM 70 MG/1
TABLET ORAL
Qty: 12 TABLET | Refills: 1 | Status: SHIPPED | OUTPATIENT
Start: 2021-06-10 | End: 2021-12-27

## 2021-06-10 ASSESSMENT — ENCOUNTER SYMPTOMS
BACK PAIN: 0
ROS GI COMMENTS: LOWER ABDOMINAL PRESSURE
FLANK PAIN: 0

## 2021-06-10 ASSESSMENT — FIBROSIS 4 INDEX: FIB4 SCORE: 1.13

## 2021-06-10 NOTE — PROGRESS NOTES
"Subjective:      Susan Menendez is a 85 y.o. female who presents with  Dysuria, concern for UTI.         Patient is an 85-year-old female who presents to urgent care with concern for possible UTI.  Patient reports remote history of UTIs in the past of which she is concerned as she has upcoming trip.  Patient also reports suprapubic pressure developed this morning prompting evaluation as she has had this in the past with UTIs although she has also had this with her interstitial cystitis as well.  Patient reports that she was taken off her previous medication approximately 8 months ago as she has not had issues with her IC in several years.    Dysuria   This is a new problem. The current episode started yesterday. The problem occurs intermittently. The problem has been waxing and waning. The quality of the pain is described as aching. There is no history of pyelonephritis. Associated symptoms include frequency, hesitancy and urgency. Pertinent negatives include no flank pain or hematuria. Her past medical history is significant for recurrent UTIs. History of interstitial cystitis       Review of Systems   Gastrointestinal:        Lower abdominal pressure   Genitourinary: Positive for dysuria, frequency, hesitancy and urgency. Negative for flank pain and hematuria.   Musculoskeletal: Negative for back pain.   All other systems reviewed and are negative.         Objective:     /76 (BP Location: Left arm, Patient Position: Sitting, BP Cuff Size: Small adult)   Pulse 78   Temp 36.7 °C (98 °F) (Temporal)   Resp 14   Ht 1.702 m (5' 7\")   Wt 62.1 kg (137 lb)   SpO2 93%   BMI 21.46 kg/m²    PMH:  has a past medical history of Anemia, Anesthesia, Arthritis (02/06/2018), Autoimmune hepatitis (HCC), Chronic diarrhea, Chronic UTI, Depression, Hiatus hernia syndrome, History of cataract surgery, Hypothyroid, Interstitial cystitis, Other specified disorder of intestines, Pain, Pneumonia, Urinary bladder disorder, " "and Varicose veins.  MEDS: Reviewed .   ALLERGIES:   Allergies   Allergen Reactions   • Ceftin [Cefuroxime Axetil] Shortness of Breath     weaness    • Amoxicillin      Unknown reaction   • Augmentin Nausea     Stomach ache     • Gluten Meal Unspecified     Weak and tongue breaks out    • Keflex      rash   • Morphine Nausea     extreme   • Pcn [Penicillins] Shortness of Breath   • Sulfa Drugs      Unknown rxn     SURGHX:   Past Surgical History:   Procedure Laterality Date   • LUMBAR FUSION POSTERIOR N/A 12/3/2018    Procedure: L4-5 TLIF;  Surgeon: Giancarlo Berman M.D.;  Location: SURGERY [facility];  Service: Neurosurgery   • DOREEN BY LAPAROSCOPY  2/12/2018    Procedure: DOREEN BY LAPAROSCOPY;  Surgeon: John H Ganser, M.D.;  Location: SURGERY [facility];  Service: General   • BLADDER BIOPSY WITH CYSTOSCOPY  6/12/2012    Performed by JARAD GARCIA at SURGERY [facility]   • OTHER      vein stripping both legs   • OTHER      \"breast lumps removed\"   • OTHER ORTHOPEDIC SURGERY      right knee scope   • OTHER ORTHOPEDIC SURGERY      aileen foot surgery     SOCHX:  reports that she quit smoking about 61 years ago. Her smoking use included cigarettes. She has a 1.00 pack-year smoking history. She has never used smokeless tobacco. She reports current alcohol use. She reports that she does not use drugs.  FH: Family history was reviewed, no pertinent findings to report    Physical Exam  Vitals reviewed.   Constitutional:       General: She is not in acute distress.     Appearance: She is well-developed.   HENT:      Head: Normocephalic and atraumatic.   Eyes:      Conjunctiva/sclera: Conjunctivae normal.      Pupils: Pupils are equal, round, and reactive to light.   Neck:      Trachea: No tracheal deviation.   Cardiovascular:      Rate and Rhythm: Normal rate and regular rhythm.      Heart sounds: No murmur heard.     Pulmonary:      Effort: Pulmonary effort is normal. No respiratory distress.      " Breath sounds: Normal breath sounds.   Abdominal:      Tenderness: There is no right CVA tenderness or left CVA tenderness.   Musculoskeletal:         General: Normal range of motion.      Cervical back: Normal range of motion and neck supple.   Skin:     General: Skin is warm.      Findings: No rash.   Neurological:      Mental Status: She is alert and oriented to person, place, and time.      Coordination: Coordination normal.   Psychiatric:         Behavior: Behavior normal.         Thought Content: Thought content normal.         Judgment: Judgment normal.                      UA- clear.     Assessment/Plan:        1. Chronic interstitial cystitis    Patient's urinalysis is clear today.  Discussed the importance of continued diet changes-the avoidance of acidic foods, and decrease caffeine intake, increased water.  Patient is to follow-up with urology if symptoms persist or continue.  No evidence of infection today.  Appropriate PPE worn at all times by provider.   Pt. Had face mask on throughout entirety of the visit other than oropharyngeal examination today.     The patient and/or guardian demonstrated a good understanding and agreed with the treatment plan.    Please note that this dictation was created using voice recognition software. I have made every reasonable attempt to correct obvious errors, but I expect that there are errors of grammar and possibly content that I did not discover before finalizing the note.

## 2021-06-10 NOTE — PROGRESS NOTES
"Chief Complaint- joint pain     Subjective:   Susan Menendez is a 85 y.o. female here today for follow up of rheumatological issues    Of note, patient likes to be called \"Johnathan\"    This is a follow-up visit for this patient who we see in this clinic predominantly for DJD, osteoporosis and remote history of PMR for which patient takes Tylenol and Fosamax 70 mg p.o. weekly.  Patient comes in today stating that she has been having a flare of fibromyalgia and interstitial cystitis.  With the weather changes but symptoms are improving.  Patient denies any headaches, denies any vision changes, denies any jaw claudication.  Patient denies any side effects from the medication, denies any unexplained weight loss, denies any fevers of unknown etiology, denies any GI upset, denies any rashes, denies any new joint swelling, denies recurrent infections.      Patient does have chronic pain in the shoulders secondary to past history of rotator cuff pathology status post surgical intervention right shoulder at [facility] but nothing more than usual.    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  Additional comorbidities include a diagnosis of autoimmune hepatitis and interstitial cystitis.  Patient also states she had a history of fibromyalgia in the past.  Patient also history of low back surgery with benefit in December 2018.        Uric acid 3.4 2/2013; Uric acid 2.7 9/2020  RF neg 2/2013  CCP neg 2/2015  MARLEE neg 2/2015  DEXA 10/2014 T scores -2.3, -1.2  DEXA 11/8/2016 T scores -0.8, -2.3    FRAX 11/8/2016 major osteoporotic fracture risk is 23.8%, hip fracture risk 8.4%  DEXA 3/7/2018 T scores -0.6, -2.1  FRAX 3/7/2018 major osteoporotic fracture risk 26.1%, hip fracture risk 9.5%  DEXA 9/9/2020 T scores -4.7 (forearm), -2.4  FRAX 9/9/2020 major osteoporotic fracture risk " 26.8%, hip fracture risk 10.5%   Patient has been on Fosamax since March 2018  Hand x-rays 9/2020-indicates  DJD  MRI LS spine 10/2016-indicates DJD and mild to moderate neural foraminal narrowing   Left shoulder x-rays 7/2019-   Impression         1.  Mild degenerative change of LEFT shoulder.  2.  No fracture or dislocation.      Right shoulder x-rays 7/2019 -  Impression         1.  Moderate right acromioclavicular osteoarthrosis.  2.  Mild glenohumeral osteoarthrosis.  3.  No acute fracture or dislocation.       Current Outpatient Medications   Medication Sig Dispense Refill   • cyclosporin (RESTASIS) 0.05 % ophthalmic emulsion Administer 1 Drop into both eyes 2 times a day.     • alendronate (FOSAMAX) 70 MG Tab TAKE 1 TABLET BY MOUTH ONCE A WEEK ON  WEDNESDAY 12 tablet 1   • tamsulosin (FLOMAX) 0.4 MG capsule Take 0.4 mg by mouth ONE-HALF HOUR AFTER BREAKFAST.     • Testosterone 20 % Cream by Does not apply route.     • docusate sodium 100 MG Cap Take 100 mg by mouth 2 Times a Day. 60 Cap    • thyroid (ARMOUR THYROID) 60 MG Tab Take 60 mg by mouth every day.     • estradiol (ESTRACE) 0.1 MG/GM vaginal cream Insert 1 g in vagina every evening.     • Cholecalciferol (VITAMIN D) 2000 units Cap Take 2,000 Units by mouth every day.     • acetaminophen (TYLENOL) 500 MG Tab Take 1,500 mg by mouth every 6 hours as needed for Moderate Pain.     • trazodone (DESYREL) 100 MG TABS Take 50 mg by mouth every evening.     • Multiple Vitamins-Minerals (EYE VITAMINS PO) Take 1 Tab by mouth every day.       No current facility-administered medications for this visit.     She  has a past medical history of Anemia, Anesthesia, Arthritis (02/06/2018), Autoimmune hepatitis (HCC), Chronic diarrhea, Chronic UTI, Depression, Hiatus hernia syndrome, History of cataract surgery, Hypothyroid, Interstitial cystitis, Other specified disorder of intestines, Pain, Pneumonia, Urinary bladder disorder, and Varicose veins.    ROS   Other than  what is mentioned in HPI or physical exam, there is no history of headaches, double vision or blurred vision. No temporal tenderness or jaw claudication. No trouble swallowing difficulties or sore throats.  No chest complaints including chest pain, cough or sputum production. No GI complaints including nausea, vomiting, change in bowel habits, or past peptic ulcer disease. No history of blood in the stools. No urinary complaints including dysuria or frequency. No history of alopecia, photosensitivity, oral ulcerations, Raynaud's phenomena.       Objective:     /72   Pulse 78   Temp 36.1 °C (96.9 °F) (Temporal)   Resp 14   Wt 63.5 kg (140 lb)   SpO2 96%  Body mass index is 21.93 kg/m².   Physical Exam:    Constitutional: Alert and oriented X3, patient is talkative with good eye contact.Skin: Warm, dry, good turgor, no rashes in visible areas, no psoriatic lesions, no petechial lesions, no malar rashes  .Eye: Equal, round and reactive, conjunctiva clear, lids normal EOM intactENMT: Lips without lesions, good dentition, no oropharyngeal ulcers, moist buccal mucosa, pinna without deformityNeck: Trachea midline, no masses, no thyromegaly.Lymph:  No cervical lymphadenopathy, no axillary lymphadenopathy, no supraclavicular lymphadenopathyRespiratory: Unlabored respiratory effort, lungs clear to auscultation, no wheezes, no ronchi.Cardiovascular: Normal S1, S2, no murmur, no edema.Abdomen: Soft, non-tender, no masses, no hepatosplenomegaly.Psych: Alert and oriented x3, normal affect and mood.Neuro: Cranial nerves 2-12 are grossly intact, no loss of sensation LEExt:no joint laxity noted in bilateral arms, no joint laxity noted in bilateral legs, some mild Heberden's nodes on the DIP joints, no dactylitis no synovitis, toes without crossover toes no splay toes    Lab Results   Component Value Date/Time    SODIUM 133 (L) 05/25/2021 12:53 PM    POTASSIUM 4.5 05/25/2021 12:53 PM    CHLORIDE 101 05/25/2021 12:53 PM     CO2 23 05/25/2021 12:53 PM    GLUCOSE 75 05/25/2021 12:53 PM    BUN 15 05/25/2021 12:53 PM    CREATININE 0.84 05/25/2021 12:53 PM    CREATININE 0.9 01/04/2005 12:05 PM      Lab Results   Component Value Date/Time    WBC 4.5 (L) 05/25/2021 12:53 PM    RBC 4.01 (L) 05/25/2021 12:53 PM    HEMOGLOBIN 12.7 05/25/2021 12:53 PM    HEMATOCRIT 37.9 05/25/2021 12:53 PM    MCV 94.5 05/25/2021 12:53 PM    MCH 31.7 05/25/2021 12:53 PM    MCHC 33.5 (L) 05/25/2021 12:53 PM    MPV 10.4 05/25/2021 12:53 PM    NEUTSPOLYS 53.20 05/25/2021 12:53 PM    LYMPHOCYTES 33.90 05/25/2021 12:53 PM    MONOCYTES 10.00 05/25/2021 12:53 PM    EOSINOPHILS 1.10 05/25/2021 12:53 PM    BASOPHILS 1.60 05/25/2021 12:53 PM      Lab Results   Component Value Date/Time    CALCIUM 9.3 05/25/2021 12:53 PM    ASTSGOT 17 05/25/2021 12:53 PM    ALTSGPT 17 05/25/2021 12:53 PM    ALKPHOSPHAT 84 05/25/2021 12:53 PM    TBILIRUBIN 0.3 05/25/2021 12:53 PM    ALBUMIN 4.1 05/25/2021 12:53 PM    ALBUMIN 4.29 03/05/2013 09:00 AM    TOTPROTEIN 6.8 05/25/2021 12:53 PM    TOTPROTEIN 7.60 03/05/2013 09:00 AM     Lab Results   Component Value Date/Time    URICACID 2.7 09/08/2020 12:56 PM    RHEUMFACTN 8 02/27/2013 10:56 AM    CCPANTIBODY 3 02/27/2015 08:44 AM    ANTINUCAB None Detected 02/27/2015 08:44 AM     Lab Results   Component Value Date/Time    SEDRATEWES 5 09/08/2020 12:56 PM     Lab Results   Component Value Date/Time    HBA1C 5.5 05/19/2014 09:25 AM     Lab Results   Component Value Date/Time    CPKTOTAL 84 11/30/2016 10:32 AM     Assessment and Plan:     1. Primary osteoarthritis involving multiple joints  Patient uses Tylenol as needed, we can do corticosteroid injections as needed  - Sed Rate; Future  - CRP QUANTITIVE (NON-CARDIAC); Future  - alendronate (FOSAMAX) 70 MG Tab; TAKE 1 TABLET BY MOUTH ONCE A WEEK ON  WEDNESDAY  Dispense: 12 tablet; Refill: 1    2. Polymyalgia (HCC)  Today check ESR and CRP  - Sed Rate; Future  - CRP QUANTITIVE (NON-CARDIAC); Future  -  alendronate (FOSAMAX) 70 MG Tab; TAKE 1 TABLET BY MOUTH ONCE A WEEK ON  WEDNESDAY  Dispense: 12 tablet; Refill: 1    3. Senile osteoporosis  Last DEXA September 2020 Next DEXA September 2022  Patient currently on Fosamax 70 mg p.o. weekly   continue calcium citrate 1200 mg by mouth daily and vitamin D about 2000 units by mouth daily and magnesium 200 mg by mouth daily  - Sed Rate; Future  - CRP QUANTITIVE (NON-CARDIAC); Future  - alendronate (FOSAMAX) 70 MG Tab; TAKE 1 TABLET BY MOUTH ONCE A WEEK ON  WEDNESDAY  Dispense: 12 tablet; Refill: 1    4. Long term use of alendronate therapy (Fosamax)  On Fosamax 70 mg p.o. weekly, patient needs monitoring labs every 6 months, next labs due about November 2021.  - alendronate (FOSAMAX) 70 MG Tab; TAKE 1 TABLET BY MOUTH ONCE A WEEK ON  WEDNESDAY  Dispense: 12 tablet; Refill: 1    5. Hypothyroidism, unspecified type  Can exacerbate joint pain, I recommend monitoring thyroid on a regular basis to assure it is not contributing to the patient's joint pain    6. Autoimmune hepatitis  Today check serologies, recent liver functions normal    Followup: Return in about 6 months (around 12/10/2021). or sooner nupur Menendez  was seen 30 minutes face-to-face of which more than 50% of the time was spent counseling the patient (excluding time for procedures)  regarding  rheumatological condition and care. Therapy was discussed in detail.      Please note that this dictation was created using voice recognition software. I have made every reasonable attempt to correct obvious errors, but I expect that there are errors of grammar and possibly content that I did not discover before finalizing the note.

## 2021-06-21 ENCOUNTER — HOSPITAL ENCOUNTER (OUTPATIENT)
Facility: MEDICAL CENTER | Age: 86
End: 2021-06-21
Attending: PHYSICIAN ASSISTANT
Payer: MEDICARE

## 2021-06-21 PROCEDURE — 87086 URINE CULTURE/COLONY COUNT: CPT

## 2021-06-24 LAB
BACTERIA UR CULT: NORMAL
SIGNIFICANT IND 70042: NORMAL
SITE SITE: NORMAL
SOURCE SOURCE: NORMAL

## 2021-07-19 ENCOUNTER — HOSPITAL ENCOUNTER (OUTPATIENT)
Dept: RADIOLOGY | Facility: MEDICAL CENTER | Age: 86
End: 2021-07-19
Attending: INTERNAL MEDICINE
Payer: MEDICARE

## 2021-07-19 DIAGNOSIS — A04.9 BACTERIAL ENTERITIS: ICD-10-CM

## 2021-07-19 DIAGNOSIS — K59.09 CHRONIC CONSTIPATION: ICD-10-CM

## 2021-07-19 PROCEDURE — 74177 CT ABD & PELVIS W/CONTRAST: CPT | Mod: MH

## 2021-07-19 PROCEDURE — 700117 HCHG RX CONTRAST REV CODE 255: Performed by: INTERNAL MEDICINE

## 2021-07-19 RX ADMIN — IOHEXOL 100 ML: 350 INJECTION, SOLUTION INTRAVENOUS at 16:03

## 2021-08-05 ENCOUNTER — HOSPITAL ENCOUNTER (OUTPATIENT)
Dept: LAB | Facility: MEDICAL CENTER | Age: 86
End: 2021-08-05
Attending: INTERNAL MEDICINE
Payer: MEDICARE

## 2021-08-05 ENCOUNTER — OFFICE VISIT (OUTPATIENT)
Dept: RHEUMATOLOGY | Facility: MEDICAL CENTER | Age: 86
End: 2021-08-05
Payer: MEDICARE

## 2021-08-05 VITALS
WEIGHT: 140 LBS | TEMPERATURE: 97.1 F | SYSTOLIC BLOOD PRESSURE: 110 MMHG | HEART RATE: 76 BPM | DIASTOLIC BLOOD PRESSURE: 68 MMHG | OXYGEN SATURATION: 95 % | RESPIRATION RATE: 12 BRPM | BODY MASS INDEX: 21.93 KG/M2

## 2021-08-05 DIAGNOSIS — M15.9 PRIMARY OSTEOARTHRITIS INVOLVING MULTIPLE JOINTS: ICD-10-CM

## 2021-08-05 DIAGNOSIS — F43.10 PTSD (POST-TRAUMATIC STRESS DISORDER): ICD-10-CM

## 2021-08-05 DIAGNOSIS — K75.4 AUTOIMMUNE HEPATITIS (HCC): ICD-10-CM

## 2021-08-05 DIAGNOSIS — M35.3 POLYMYALGIA (HCC): ICD-10-CM

## 2021-08-05 DIAGNOSIS — Z79.83 LONG TERM USE OF ALENDRONATE THERAPY (FOSAMAX): ICD-10-CM

## 2021-08-05 DIAGNOSIS — Z79.1 NSAID LONG-TERM USE: ICD-10-CM

## 2021-08-05 DIAGNOSIS — M81.0 SENILE OSTEOPOROSIS: ICD-10-CM

## 2021-08-05 DIAGNOSIS — F41.9 ANXIETY: ICD-10-CM

## 2021-08-05 DIAGNOSIS — E03.9 HYPOTHYROIDISM, UNSPECIFIED TYPE: ICD-10-CM

## 2021-08-05 LAB
CRP SERPL HS-MCNC: <0.3 MG/DL (ref 0–0.75)
ERYTHROCYTE [SEDIMENTATION RATE] IN BLOOD BY WESTERGREN METHOD: 7 MM/HOUR (ref 0–25)

## 2021-08-05 PROCEDURE — 36415 COLL VENOUS BLD VENIPUNCTURE: CPT

## 2021-08-05 PROCEDURE — 86235 NUCLEAR ANTIGEN ANTIBODY: CPT | Mod: 91

## 2021-08-05 PROCEDURE — 99214 OFFICE O/P EST MOD 30 MIN: CPT | Performed by: INTERNAL MEDICINE

## 2021-08-05 PROCEDURE — 83516 IMMUNOASSAY NONANTIBODY: CPT

## 2021-08-05 PROCEDURE — 86140 C-REACTIVE PROTEIN: CPT

## 2021-08-05 PROCEDURE — 85652 RBC SED RATE AUTOMATED: CPT

## 2021-08-05 RX ORDER — SULINDAC 150 MG/1
TABLET ORAL
Qty: 60 TABLET | Refills: 0 | Status: SHIPPED | OUTPATIENT
Start: 2021-08-05 | End: 2021-12-14 | Stop reason: SDUPTHER

## 2021-08-05 ASSESSMENT — FIBROSIS 4 INDEX: FIB4 SCORE: 1.13

## 2021-08-05 NOTE — PROGRESS NOTES
"Chief Complaint- joint pain     Subjective:   Susan Menendez is a 85 y.o. female here today for follow up of rheumatological issues\    Of note, patient likes to be called \"Johnathan\"    This is an early visit for this patient who we see in this clinic predominantly for DJD and osteoporosis.  Patient does have a remote history of PMR for which at last visit we asked patient to get CRP and ESR which patient did not follow through.  Patient also has a past diagnosis of autoimmune hepatitis for which we are asked patient to get her labs done patient's not followed through.    Patient just recently got  and is scheduled to go on her immune, patient comes in today feeling that her PTSD and fibromyalgia may be acting up.  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  Additional comorbidities include a diagnosis of autoimmune hepatitis and interstitial cystitis.  Patient also states she had a history of fibromyalgia in the past.  Patient also shares today that she has a past diagnosis of PTSD starting with her sons strokes, patient has not sought any treatment in the past.  Patient also history of low back surgery with benefit in December 2018.    Addendum 8/9/2021  AMA neg 8/2021  F-actin neg 8/2021  SSA neg 8/2021  SSB neg 8/2021        Uric acid 3.4 2/2013; Uric acid 2.7 9/2020  RF neg 2/2013  CCP neg 2/2015  MARLEE neg 2/2015  DEXA 10/2014 T scores -2.3, -1.2  DEXA 11/8/2016 T scores -0.8, -2.3    FRAX 11/8/2016 major osteoporotic fracture risk is 23.8%, hip fracture risk 8.4%  DEXA 3/7/2018 T scores -0.6, -2.1  FRAX 3/7/2018 major osteoporotic fracture risk 26.1%, hip fracture risk 9.5%  DEXA 9/9/2020 T scores -4.7 (forearm), -2.4  FRAX " 9/9/2020 major osteoporotic fracture risk 26.8%, hip fracture risk 10.5%   Patient has been on Fosamax since March 2018  Hand x-rays 9/2020-indicates  DJD  MRI LS spine 10/2016-indicates DJD and mild to moderate neural foraminal narrowing   Left shoulder x-rays 7/2019-   Impression         1.  Mild degenerative change of LEFT shoulder.  2.  No fracture or dislocation.      Right shoulder x-rays 7/2019 -  Impression         1.  Moderate right acromioclavicular osteoarthrosis.  2.  Mild glenohumeral osteoarthrosis.  3.  No acute fracture or dislocation.        Current Outpatient Medications   Medication Sig Dispense Refill   • sulindac (CLINORIL) 150 MG Tab 1 tab po bid prn joint pain, take with food 60 tablet 0   • cyclosporin (RESTASIS) 0.05 % ophthalmic emulsion Administer 1 Drop into both eyes 2 times a day.     • alendronate (FOSAMAX) 70 MG Tab TAKE 1 TABLET BY MOUTH ONCE A WEEK ON  WEDNESDAY 12 tablet 1   • tamsulosin (FLOMAX) 0.4 MG capsule Take 0.4 mg by mouth ONE-HALF HOUR AFTER BREAKFAST.     • Testosterone 20 % Cream by Does not apply route.     • docusate sodium 100 MG Cap Take 100 mg by mouth 2 Times a Day. 60 Cap    • thyroid (ARMOUR THYROID) 60 MG Tab Take 150 mg by mouth every day.     • estradiol (ESTRACE) 0.1 MG/GM vaginal cream Insert 1 g in vagina every evening.     • Cholecalciferol (VITAMIN D) 2000 units Cap Take 2,000 Units by mouth every day.     • acetaminophen (TYLENOL) 500 MG Tab Take 1,500 mg by mouth every 6 hours as needed for Moderate Pain.     • trazodone (DESYREL) 100 MG TABS Take 50 mg by mouth every evening.     • Multiple Vitamins-Minerals (EYE VITAMINS PO) Take 1 Tab by mouth every day.       No current facility-administered medications for this visit.     She  has a past medical history of Anemia, Anesthesia, Arthritis (02/06/2018), Autoimmune hepatitis (HCC), Chronic diarrhea, Chronic UTI, Depression, Hiatus hernia syndrome, History of cataract surgery, Hypothyroid, Interstitial  cystitis, Other specified disorder of intestines, Pain, Pneumonia, Urinary bladder disorder, and Varicose veins.    ROS   Other than what is mentioned in HPI or physical exam, there is no history of headaches, double vision or blurred vision. No temporal tenderness or jaw claudication. No trouble swallowing difficulties or sore throats.  No chest complaints including chest pain, cough or sputum production. No GI complaints including nausea, vomiting, change in bowel habits, or past peptic ulcer disease. No history of blood in the stools. No urinary complaints including dysuria or frequency. No history of alopecia, photosensitivity, oral ulcerations, Raynaud's phenomena.       Objective:     /68   Pulse 76   Temp 36.2 °C (97.1 °F) (Temporal)   Resp 12   Wt 63.5 kg (140 lb)   SpO2 95%  Body mass index is 21.93 kg/m².   Physical Exam:    Constitutional: Alert and oriented X3, patient is talkative with good eye contact.Skin: Warm, dry, good turgor, no rashes in visible areas, no psoriatic lesions, no petechial lesions, no malar rashes  .Eye: Equal, round and reactive, conjunctiva clear, lids normal EOM intactENMT: Lips without lesions, good dentition, no oropharyngeal ulcers, moist buccal mucosa, pinna without deformityNeck: Trachea midline, no masses, no thyromegaly.Lymph:  No cervical lymphadenopathy, no axillary lymphadenopathy, no supraclavicular lymphadenopathyRespiratory: Unlabored respiratory effort, lungs clear to auscultation, no wheezes, no ronchi.Cardiovascular: Normal S1, S2, no murmur, no edema.Abdomen: Soft, non-tender, no masses, no hepatosplenomegaly.Psych: Alert and oriented x3, normal affect and mood.Neuro: Cranial nerves 2-12 are grossly intact, no loss of sensation LEExt:no joint laxity noted in bilateral arms, no joint laxity noted in bilateral legs, shoulders full range of motion    Lab Results   Component Value Date/Time    SODIUM 133 (L) 05/25/2021 12:53 PM    POTASSIUM 4.5 05/25/2021  12:53 PM    CHLORIDE 101 05/25/2021 12:53 PM    CO2 23 05/25/2021 12:53 PM    GLUCOSE 75 05/25/2021 12:53 PM    BUN 15 05/25/2021 12:53 PM    CREATININE 0.84 05/25/2021 12:53 PM    CREATININE 0.9 01/04/2005 12:05 PM      Lab Results   Component Value Date/Time    WBC 4.5 (L) 05/25/2021 12:53 PM    RBC 4.01 (L) 05/25/2021 12:53 PM    HEMOGLOBIN 12.7 05/25/2021 12:53 PM    HEMATOCRIT 37.9 05/25/2021 12:53 PM    MCV 94.5 05/25/2021 12:53 PM    MCH 31.7 05/25/2021 12:53 PM    MCHC 33.5 (L) 05/25/2021 12:53 PM    MPV 10.4 05/25/2021 12:53 PM    NEUTSPOLYS 53.20 05/25/2021 12:53 PM    LYMPHOCYTES 33.90 05/25/2021 12:53 PM    MONOCYTES 10.00 05/25/2021 12:53 PM    EOSINOPHILS 1.10 05/25/2021 12:53 PM    BASOPHILS 1.60 05/25/2021 12:53 PM      Lab Results   Component Value Date/Time    CALCIUM 9.3 05/25/2021 12:53 PM    ASTSGOT 17 05/25/2021 12:53 PM    ALTSGPT 17 05/25/2021 12:53 PM    ALKPHOSPHAT 84 05/25/2021 12:53 PM    TBILIRUBIN 0.3 05/25/2021 12:53 PM    ALBUMIN 4.1 05/25/2021 12:53 PM    ALBUMIN 4.29 03/05/2013 09:00 AM    TOTPROTEIN 6.8 05/25/2021 12:53 PM    TOTPROTEIN 7.60 03/05/2013 09:00 AM     Lab Results   Component Value Date/Time    URICACID 2.7 09/08/2020 12:56 PM    RHEUMFACTN 8 02/27/2013 10:56 AM    CCPANTIBODY 3 02/27/2015 08:44 AM    ANTINUCAB None Detected 02/27/2015 08:44 AM     Lab Results   Component Value Date/Time    SEDRATEWES 5 09/08/2020 12:56 PM     Lab Results   Component Value Date/Time    HBA1C 5.5 05/19/2014 09:25 AM     Lab Results   Component Value Date/Time    CPKTOTAL 84 11/30/2016 10:32 AM     Assessment and Plan:     1. Primary osteoarthritis involving multiple joints  Do trial of sulindac 150 mg p.o. twice daily with food as needed  Asked patient to follow through with getting her labs including ESR and CRP  - sulindac (CLINORIL) 150 MG Tab; 1 tab po bid prn joint pain, take with food  Dispense: 60 tablet; Refill: 0    2. Senile osteoporosis  Last DEXA September 2020 Next DEXA  September 2022  Patient currently on Fosamax 70 mg p.o. weekly   continue calcium citrate 1200 mg by mouth daily and vitamin D about 2000 units by mouth daily and magnesium 200 mg by mouth daily  - sulindac (CLINORIL) 150 MG Tab; 1 tab po bid prn joint pain, take with food  Dispense: 60 tablet; Refill: 0    3. Long term use of alendronate therapy (Fosamax)  Patient needs monitoring labs every 6 months, next labs due about November 2021  Currently on Fosamax 70 mg p.o. weekly  - sulindac (CLINORIL) 150 MG Tab; 1 tab po bid prn joint pain, take with food  Dispense: 60 tablet; Refill: 0    4. Hypothyroidism, unspecified type  Can exacerbate joint pain, I recommend monitoring thyroid on a regular basis to assure it is not contributing to the patient's joint pain    5. Autoimmune hepatitis (HCC)  Asked patient to check labs last visit patient has not followed through, patient states she will check today    6. NSAID long-term use  Do trial of sulindac 150 mg p.o. twice daily  Patient will need monitoring labs every 6 months next labs due about November 2021  - sulindac (CLINORIL) 150 MG Tab; 1 tab po bid prn joint pain, take with food  Dispense: 60 tablet; Refill: 0    7. PTSD (post-traumatic stress disorder)  Submitted referral to behavioral health  - REFERRAL TO BEHAVIORAL HEALTH    8. Anxiety  Submitted referral to behavioral health  - REFERRAL TO BEHAVIORAL HEALTH    Followup: Return in about 71 weeks (around 12/15/2022). or sooner prn    Susan Menendez  was seen 30 minutes face-to-face of which more than 50% of the time was spent counseling the patient (excluding time for procedures)  regarding  rheumatological condition and care. Therapy was discussed in detail.      Please note that this dictation was created using voice recognition software. I have made every reasonable attempt to correct obvious errors, but I expect that there are errors of grammar and possibly content that I did not discover before  finalizing the note.

## 2021-08-07 ENCOUNTER — HOSPITAL ENCOUNTER (OUTPATIENT)
Facility: MEDICAL CENTER | Age: 86
End: 2021-08-07
Attending: PHYSICIAN ASSISTANT
Payer: MEDICARE

## 2021-08-07 ENCOUNTER — OFFICE VISIT (OUTPATIENT)
Dept: URGENT CARE | Facility: PHYSICIAN GROUP | Age: 86
End: 2021-08-07
Payer: MEDICARE

## 2021-08-07 VITALS
SYSTOLIC BLOOD PRESSURE: 120 MMHG | HEIGHT: 67 IN | OXYGEN SATURATION: 94 % | HEART RATE: 63 BPM | TEMPERATURE: 98.1 F | DIASTOLIC BLOOD PRESSURE: 74 MMHG | BODY MASS INDEX: 22.6 KG/M2 | RESPIRATION RATE: 14 BRPM | WEIGHT: 144 LBS

## 2021-08-07 DIAGNOSIS — R39.15 URINARY URGENCY: ICD-10-CM

## 2021-08-07 DIAGNOSIS — R35.0 URINARY FREQUENCY: ICD-10-CM

## 2021-08-07 DIAGNOSIS — R30.0 DYSURIA: ICD-10-CM

## 2021-08-07 LAB
APPEARANCE UR: CLEAR
BILIRUB UR STRIP-MCNC: NORMAL MG/DL
COLOR UR AUTO: YELLOW
GLUCOSE UR STRIP.AUTO-MCNC: NORMAL MG/DL
KETONES UR STRIP.AUTO-MCNC: NORMAL MG/DL
LEUKOCYTE ESTERASE UR QL STRIP.AUTO: NORMAL
MITOCHONDRIA M2 IGG SER-ACNC: 2.8 UNITS (ref 0–24.9)
NITRITE UR QL STRIP.AUTO: NORMAL
PH UR STRIP.AUTO: 7.5 [PH] (ref 5–8)
PROT UR QL STRIP: NORMAL MG/DL
RBC UR QL AUTO: NORMAL
SMA IGG SER-ACNC: 13 UNITS (ref 0–19)
SP GR UR STRIP.AUTO: 1.01
UROBILINOGEN UR STRIP-MCNC: 0.2 MG/DL

## 2021-08-07 PROCEDURE — 87086 URINE CULTURE/COLONY COUNT: CPT

## 2021-08-07 PROCEDURE — 99214 OFFICE O/P EST MOD 30 MIN: CPT | Performed by: PHYSICIAN ASSISTANT

## 2021-08-07 PROCEDURE — 81002 URINALYSIS NONAUTO W/O SCOPE: CPT | Performed by: PHYSICIAN ASSISTANT

## 2021-08-07 RX ORDER — NITROFURANTOIN 25; 75 MG/1; MG/1
100 CAPSULE ORAL EVERY 12 HOURS
Qty: 10 CAPSULE | Refills: 0 | Status: SHIPPED
Start: 2021-08-07 | End: 2021-08-12

## 2021-08-07 ASSESSMENT — FIBROSIS 4 INDEX: FIB4 SCORE: 1.13

## 2021-08-07 NOTE — PROGRESS NOTES
Subjective:   Susan Menendez is a 85 y.o. female who presents for Painful Urination (x 1 week )        Patient has numerous intolerances to antibiotics. States that she experiences nausea with Macrobid-no anaphylaxis. Despite Cipro being on her allergy list, she states that she tolerates this antibiotic well. Additionally she believes that she tolerates cephalexin well.    Dysuria   This is a new problem. The current episode started in the past 7 days. The problem has been gradually worsening. The quality of the pain is described as burning. The pain is mild. There has been no fever. She is not sexually active. Associated symptoms include frequency and urgency. Pertinent negatives include no chills, discharge, flank pain, hematuria, hesitancy, nausea, possible pregnancy, sweats or vomiting. She has tried increased fluids for the symptoms. The treatment provided no relief. Her past medical history is significant for recurrent UTIs. Interstitial cystitis.     Review of Systems   Constitutional: Negative for chills.   Gastrointestinal: Negative for nausea and vomiting.   Genitourinary: Positive for dysuria, frequency and urgency. Negative for flank pain, hematuria and hesitancy.       PMH:  has a past medical history of Anemia, Anesthesia, Arthritis (02/06/2018), Autoimmune hepatitis (HCC), Chronic diarrhea, Chronic UTI, Depression, Hiatus hernia syndrome, History of cataract surgery, Hypothyroid, Interstitial cystitis, Other specified disorder of intestines, Pain, Pneumonia, Urinary bladder disorder, and Varicose veins.  MEDS:   Current Outpatient Medications:   •  nitrofurantoin (MACROBID) 100 MG Cap, Take 1 capsule by mouth every 12 hours for 5 days., Disp: 10 capsule, Rfl: 0  •  sulindac (CLINORIL) 150 MG Tab, 1 tab po bid prn joint pain, take with food, Disp: 60 tablet, Rfl: 0  •  cyclosporin (RESTASIS) 0.05 % ophthalmic emulsion, Administer 1 Drop into both eyes 2 times a day., Disp: , Rfl:   •  alendronate  "(FOSAMAX) 70 MG Tab, TAKE 1 TABLET BY MOUTH ONCE A WEEK ON  WEDNESDAY, Disp: 12 tablet, Rfl: 1  •  tamsulosin (FLOMAX) 0.4 MG capsule, Take 0.4 mg by mouth ONE-HALF HOUR AFTER BREAKFAST., Disp: , Rfl:   •  Testosterone 20 % Cream, by Does not apply route., Disp: , Rfl:   •  docusate sodium 100 MG Cap, Take 100 mg by mouth 2 Times a Day., Disp: 60 Cap, Rfl:   •  thyroid (ARMOUR THYROID) 60 MG Tab, Take 150 mg by mouth every day., Disp: , Rfl:   •  estradiol (ESTRACE) 0.1 MG/GM vaginal cream, Insert 1 g in vagina every evening., Disp: , Rfl:   •  Cholecalciferol (VITAMIN D) 2000 units Cap, Take 2,000 Units by mouth every day., Disp: , Rfl:   •  acetaminophen (TYLENOL) 500 MG Tab, Take 1,500 mg by mouth every 6 hours as needed for Moderate Pain., Disp: , Rfl:   •  trazodone (DESYREL) 100 MG TABS, Take 50 mg by mouth every evening., Disp: , Rfl:   •  Multiple Vitamins-Minerals (EYE VITAMINS PO), Take 1 Tab by mouth every day., Disp: , Rfl:   ALLERGIES:   Allergies   Allergen Reactions   • Ceftin [Cefuroxime Axetil] Shortness of Breath     weaness    • Amoxicillin      Unknown reaction   • Augmentin Nausea     Stomach ache     • Gluten Meal Unspecified     Weak and tongue breaks out    • Keflex      rash   • Morphine Nausea     extreme   • Nitrofurantoin Nausea   • Pcn [Penicillins] Shortness of Breath   • Sulfa Drugs      Unknown rxn     SURGHX:   Past Surgical History:   Procedure Laterality Date   • LUMBAR FUSION POSTERIOR N/A 12/3/2018    Procedure: L4-5 TLIF;  Surgeon: Giancarlo Berman M.D.;  Location: [facility];  Service: Neurosurgery   • DOREEN BY LAPAROSCOPY  2/12/2018    Procedure: DOREEN BY LAPAROSCOPY;  Surgeon: John H Ganser, M.D.;  Location: SUR[facility]ERY [facility];  Service: General   • BLADDER BIOPSY WITH CYSTOSCOPY  6/12/2012    Performed by JARAD GARCIA at SURGERY [facility]   • OTHER      vein stripping both legs   • OTHER      \"breast lumps removed\"   • OTHER ORTHOPEDIC " "SURGERY      right knee scope   • OTHER ORTHOPEDIC SURGERY      aileen foot surgery     SOCHX:  reports that she quit smoking about 61 years ago. Her smoking use included cigarettes. She has a 1.00 pack-year smoking history. She has never used smokeless tobacco. She reports current alcohol use. She reports that she does not use drugs.  FH: Family history was reviewed, no pertinent findings to report   Objective:   /74   Pulse 63   Temp 36.7 °C (98.1 °F) (Temporal)   Resp 14   Ht 1.702 m (5' 7\")   Wt 65.3 kg (144 lb)   SpO2 94%   BMI 22.55 kg/m²   Physical Exam  Vitals reviewed.   Constitutional:       General: She is not in acute distress.     Appearance: Normal appearance. She is well-developed. She is not toxic-appearing.   HENT:      Head: Normocephalic and atraumatic.      Right Ear: External ear normal.      Left Ear: External ear normal.      Nose: Nose normal.   Eyes:      General: Gaze aligned appropriately.   Cardiovascular:      Rate and Rhythm: Normal rate and regular rhythm.   Pulmonary:      Effort: Pulmonary effort is normal. No respiratory distress.      Breath sounds: No stridor.   Abdominal:      Tenderness: There is no abdominal tenderness. There is no right CVA tenderness or left CVA tenderness.   Musculoskeletal:      Cervical back: Neck supple.   Skin:     General: Skin is warm and dry.      Capillary Refill: Capillary refill takes less than 2 seconds.   Neurological:      Mental Status: She is alert and oriented to person, place, and time.      Comments: CN2-12 grossly intact   Psychiatric:         Speech: Speech normal.         Behavior: Behavior normal.           Results for orders placed or performed in visit on 08/07/21   POCT Urinalysis   Result Value Ref Range    POC Color yellow Negative    POC Appearance clear Negative    POC Leukocyte Esterase small Negative    POC Nitrites neg Negative    POC Urobiligen 0.2 Negative (0.2) mg/dL    POC Protein neg Negative mg/dL    POC Urine " PH 7.5 5.0 - 8.0    POC Blood neg Negative    POC Specific Gravity 1.010 <1.005 - >1.030    POC Ketones neg Negative mg/dL    POC Bilirubin neg Negative mg/dL    POC Glucose neg Negative mg/dL       Assessment/Plan:   1. Dysuria  - nitrofurantoin (MACROBID) 100 MG Cap; Take 1 capsule by mouth every 12 hours for 5 days.  Dispense: 10 capsule; Refill: 0  - POCT Urinalysis  - Urine Culture; Future    2. Urinary frequency  - POCT Urinalysis    3. Urinary urgency  - POCT Urinalysis    Patient has small leuks in her urine today.  No RBCs or nitrates.  Acute cystitis is a consideration.  Interstitial cystitis is also consideration.    Discussed monitoring symptoms and waiting for culture results versus initiating antibiotic therapy.  Through shared decision making we did decide to begin patient on an antibiotic.  I would prefer not to use ciprofloxacin due to associated risks and side effects.  Patient is amenable to trying Macrobid again.  No history of anaphylaxis with this, but she does experience nausea.  I would like her to take this with food in order to mitigate that side effect.  I also recommend beginning a probiotic while she is taking this.    Continue to drink plenty of fluids and follow-up with urology.  I will contact patient with urine culture results and adjust treatment plan as indicated.    Differential diagnosis, natural history, supportive care, and indications for immediate follow-up discussed.

## 2021-08-08 DIAGNOSIS — R30.0 DYSURIA: ICD-10-CM

## 2021-08-08 PROBLEM — S22.000A COMPRESSION FRACTURE OF THORACIC VERTEBRA (HCC): Status: ACTIVE | Noted: 2020-01-22

## 2021-08-08 PROBLEM — F32.A DEPRESSIVE DISORDER: Status: ACTIVE | Noted: 2021-08-08

## 2021-08-08 PROBLEM — Z87.440 HISTORY OF RECURRENT URINARY TRACT INFECTION: Status: ACTIVE | Noted: 2018-05-01

## 2021-08-08 PROBLEM — N30.10 CHRONIC INTERSTITIAL CYSTITIS: Status: ACTIVE | Noted: 2018-11-20

## 2021-08-08 PROBLEM — M54.50 LOW BACK PAIN: Status: ACTIVE | Noted: 2018-11-20

## 2021-08-08 PROBLEM — N39.0 RECURRENT URINARY TRACT INFECTION: Status: ACTIVE | Noted: 2021-03-10

## 2021-08-08 PROBLEM — N39.0 URINARY TRACT INFECTIOUS DISEASE: Status: ACTIVE | Noted: 2021-08-08

## 2021-08-08 PROBLEM — R35.0 INCREASED FREQUENCY OF URINATION: Status: ACTIVE | Noted: 2018-05-01

## 2021-08-08 PROBLEM — R39.198 SLOWING OF URINARY STREAM: Status: ACTIVE | Noted: 2021-08-08

## 2021-08-08 PROBLEM — M43.10 DEGENERATIVE SPONDYLOLISTHESIS: Status: ACTIVE | Noted: 2018-11-20

## 2021-08-08 LAB
ENA SS-B IGG SER IA-ACNC: 0 AU/ML (ref 0–40)
SSA52 R0ENA AB IGG Q0420: 0 AU/ML (ref 0–40)
SSA60 R0ENA AB IGG Q0419: 0 AU/ML (ref 0–40)

## 2021-08-08 ASSESSMENT — ENCOUNTER SYMPTOMS
VOMITING: 0
SWEATS: 0
NAUSEA: 0
CHILLS: 0
FLANK PAIN: 0

## 2021-08-09 ENCOUNTER — TELEPHONE (OUTPATIENT)
Dept: RHEUMATOLOGY | Facility: MEDICAL CENTER | Age: 86
End: 2021-08-09

## 2021-08-09 NOTE — TELEPHONE ENCOUNTER
Please notify patient that I am not that good on identifying cysts, would be best to ask the doctor that ordered the CT scan of the abdomen

## 2021-08-09 NOTE — TELEPHONE ENCOUNTER
Susan called to let you know she saw GI and they ordered a CT of Abd. She is asking that you look at the CT results. She stated that the Ct showed some Cysts.     What are your thoughts on the results?  Please advise and thank you

## 2021-08-10 LAB
BACTERIA UR CULT: NORMAL
SIGNIFICANT IND 70042: NORMAL
SITE SITE: NORMAL
SOURCE SOURCE: NORMAL

## 2021-08-11 ENCOUNTER — HOSPITAL ENCOUNTER (OUTPATIENT)
Dept: LAB | Facility: MEDICAL CENTER | Age: 86
End: 2021-08-11
Attending: INTERNAL MEDICINE
Payer: MEDICARE

## 2021-08-11 LAB
ALBUMIN SERPL BCP-MCNC: 4.4 G/DL (ref 3.2–4.9)
ALBUMIN/GLOB SERPL: 1.6 G/DL
ALP SERPL-CCNC: 93 U/L (ref 30–99)
ALT SERPL-CCNC: 16 U/L (ref 2–50)
ANION GAP SERPL CALC-SCNC: 11 MMOL/L (ref 7–16)
AST SERPL-CCNC: 19 U/L (ref 12–45)
BASOPHILS # BLD AUTO: 1.2 % (ref 0–1.8)
BASOPHILS # BLD: 0.05 K/UL (ref 0–0.12)
BILIRUB SERPL-MCNC: 0.4 MG/DL (ref 0.1–1.5)
BUN SERPL-MCNC: 16 MG/DL (ref 8–22)
CALCIUM SERPL-MCNC: 9.7 MG/DL (ref 8.5–10.5)
CHLORIDE SERPL-SCNC: 102 MMOL/L (ref 96–112)
CO2 SERPL-SCNC: 24 MMOL/L (ref 20–33)
CREAT SERPL-MCNC: 0.78 MG/DL (ref 0.5–1.4)
CRP SERPL HS-MCNC: <0.3 MG/DL (ref 0–0.75)
EOSINOPHIL # BLD AUTO: 0.1 K/UL (ref 0–0.51)
EOSINOPHIL NFR BLD: 2.4 % (ref 0–6.9)
ERYTHROCYTE [DISTWIDTH] IN BLOOD BY AUTOMATED COUNT: 50.5 FL (ref 35.9–50)
FERRITIN SERPL-MCNC: 63.6 NG/ML (ref 10–291)
FOLATE SERPL-MCNC: 9.9 NG/ML
GLOBULIN SER CALC-MCNC: 2.7 G/DL (ref 1.9–3.5)
GLUCOSE SERPL-MCNC: 85 MG/DL (ref 65–99)
HCT VFR BLD AUTO: 41.8 % (ref 37–47)
HGB BLD-MCNC: 13.7 G/DL (ref 12–16)
IMM GRANULOCYTES # BLD AUTO: 0.01 K/UL (ref 0–0.11)
IMM GRANULOCYTES NFR BLD AUTO: 0.2 % (ref 0–0.9)
IRON SATN MFR SERPL: 28 % (ref 15–55)
IRON SERPL-MCNC: 88 UG/DL (ref 40–170)
LYMPHOCYTES # BLD AUTO: 1.3 K/UL (ref 1–4.8)
LYMPHOCYTES NFR BLD: 31.5 % (ref 22–41)
MCH RBC QN AUTO: 31.5 PG (ref 27–33)
MCHC RBC AUTO-ENTMCNC: 32.8 G/DL (ref 33.6–35)
MCV RBC AUTO: 96.1 FL (ref 81.4–97.8)
MONOCYTES # BLD AUTO: 0.48 K/UL (ref 0–0.85)
MONOCYTES NFR BLD AUTO: 11.6 % (ref 0–13.4)
NEUTROPHILS # BLD AUTO: 2.19 K/UL (ref 2–7.15)
NEUTROPHILS NFR BLD: 53.1 % (ref 44–72)
NRBC # BLD AUTO: 0 K/UL
NRBC BLD-RTO: 0 /100 WBC
PLATELET # BLD AUTO: 276 K/UL (ref 164–446)
PMV BLD AUTO: 10.6 FL (ref 9–12.9)
POTASSIUM SERPL-SCNC: 4.1 MMOL/L (ref 3.6–5.5)
PROT SERPL-MCNC: 7.1 G/DL (ref 6–8.2)
RBC # BLD AUTO: 4.35 M/UL (ref 4.2–5.4)
SODIUM SERPL-SCNC: 137 MMOL/L (ref 135–145)
TIBC SERPL-MCNC: 311 UG/DL (ref 250–450)
UIBC SERPL-MCNC: 223 UG/DL (ref 110–370)
VIT B12 SERPL-MCNC: 1465 PG/ML (ref 211–911)
WBC # BLD AUTO: 4.1 K/UL (ref 4.8–10.8)

## 2021-08-11 PROCEDURE — 82746 ASSAY OF FOLIC ACID SERUM: CPT

## 2021-08-11 PROCEDURE — 86140 C-REACTIVE PROTEIN: CPT

## 2021-08-11 PROCEDURE — 83540 ASSAY OF IRON: CPT

## 2021-08-11 PROCEDURE — 36415 COLL VENOUS BLD VENIPUNCTURE: CPT

## 2021-08-11 PROCEDURE — 82728 ASSAY OF FERRITIN: CPT

## 2021-08-11 PROCEDURE — 82607 VITAMIN B-12: CPT

## 2021-08-11 PROCEDURE — 83550 IRON BINDING TEST: CPT

## 2021-08-11 PROCEDURE — 85025 COMPLETE CBC W/AUTO DIFF WBC: CPT

## 2021-08-11 PROCEDURE — 80053 COMPREHEN METABOLIC PANEL: CPT

## 2021-09-13 ENCOUNTER — TELEPHONE (OUTPATIENT)
Dept: RHEUMATOLOGY | Facility: MEDICAL CENTER | Age: 86
End: 2021-09-13

## 2021-09-13 NOTE — TELEPHONE ENCOUNTER
We were not the ones that ordered the B12 level, please have patient contact Dr. Madison who ordered the B12 level.

## 2021-09-13 NOTE — TELEPHONE ENCOUNTER
Pt called to get the lab results.. She stated her B12 is high and she doesn't take any B12 supplements..       Please advise and thank you

## 2021-12-14 ENCOUNTER — OFFICE VISIT (OUTPATIENT)
Dept: RHEUMATOLOGY | Facility: MEDICAL CENTER | Age: 86
End: 2021-12-14
Payer: MEDICARE

## 2021-12-14 VITALS
WEIGHT: 139 LBS | DIASTOLIC BLOOD PRESSURE: 60 MMHG | RESPIRATION RATE: 12 BRPM | OXYGEN SATURATION: 96 % | HEART RATE: 88 BPM | TEMPERATURE: 97.3 F | BODY MASS INDEX: 21.77 KG/M2 | SYSTOLIC BLOOD PRESSURE: 110 MMHG

## 2021-12-14 DIAGNOSIS — E03.9 HYPOTHYROIDISM, UNSPECIFIED TYPE: ICD-10-CM

## 2021-12-14 DIAGNOSIS — M15.9 PRIMARY OSTEOARTHRITIS INVOLVING MULTIPLE JOINTS: ICD-10-CM

## 2021-12-14 DIAGNOSIS — M81.0 SENILE OSTEOPOROSIS: ICD-10-CM

## 2021-12-14 DIAGNOSIS — Z79.1 NSAID LONG-TERM USE: ICD-10-CM

## 2021-12-14 DIAGNOSIS — F41.9 ANXIETY: ICD-10-CM

## 2021-12-14 DIAGNOSIS — K75.4 AUTOIMMUNE HEPATITIS (HCC): ICD-10-CM

## 2021-12-14 DIAGNOSIS — R41.89 COGNITIVE CHANGE: ICD-10-CM

## 2021-12-14 DIAGNOSIS — Z79.83 LONG TERM USE OF ALENDRONATE THERAPY (FOSAMAX): ICD-10-CM

## 2021-12-14 PROCEDURE — 99214 OFFICE O/P EST MOD 30 MIN: CPT | Performed by: INTERNAL MEDICINE

## 2021-12-14 RX ORDER — SULINDAC 150 MG/1
TABLET ORAL
Qty: 60 TABLET | Refills: 0 | Status: SHIPPED | OUTPATIENT
Start: 2021-12-14 | End: 2023-08-03

## 2021-12-14 ASSESSMENT — FIBROSIS 4 INDEX: FIB4 SCORE: 1.45

## 2021-12-14 NOTE — PROGRESS NOTES
"Chief Complaint- joint pain     Subjective:   Susan Menendez is a 86 y.o. female here today for follow up of rheumatological issues      Of note, patient likes to be called \"Johnathan\"       This is a follow-up visit for this patient who is seen in this clinic for predominantly DJD and osteoporosis.  Patient does have a remote history of PMR for which patient's CRP and ESR have been normal.  Last CRP less than 0.3 August 2021, last sedimentation rate 7 August 2021.      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  Additional comorbidities include a diagnosis of autoimmune hepatitis and interstitial cystitis.  Patient also states she had a history of fibromyalgia in the past.  Patient also shares today that she has a past diagnosis of PTSD starting with her sons strokes, patient has not sought any treatment in the past.  Patient also history of low back surgery with benefit in December 2018.    Of note in August 2021 we put a referral for behavioral health for patient anxiety which patient did not follow through    Patient states that she feels her fibromyalgia is acting up, states she feels good the morning from the course the day she is feeling more tired and achy.  At last visit we did prescribe a trial of sulindac 150 mg p.o. twice daily for which patient did not follow through.    Today feels that she might have having some memory problems    She also states that occasionally with exertion patient feels short of breath, denies any fevers denies any cough denies any chest pains.       AMA neg 8/2021  F-actin neg 8/2021  SSA neg 8/2021  SSB neg 8/2021   Uric acid 3.4 2/2013; Uric acid 2.7 9/2020  RF neg 2/2013  CCP neg 2/2015  MARLEE neg 2/2015  DEXA 10/2014 T scores -2.3, -1.2  DEXA 11/8/2016 T scores -0.8, -2.3    FRAX 11/8/2016 major osteoporotic fracture risk " is 23.8%, hip fracture risk 8.4%  DEXA 3/7/2018 T scores -0.6, -2.1  FRAX 3/7/2018 major osteoporotic fracture risk 26.1%, hip fracture risk 9.5%  DEXA 9/9/2020 T scores -4.7 (forearm), -2.4  FRAX 9/9/2020 major osteoporotic fracture risk 26.8%, hip fracture risk 10.5%   Patient has been on Fosamax since March 2018  Hand x-rays 9/2020-indicates  DJD  MRI LS spine 10/2016-indicates DJD and mild to moderate neural foraminal narrowing   Left shoulder x-rays 7/2019-   Impression         1.  Mild degenerative change of LEFT shoulder.  2.  No fracture or dislocation.      Right shoulder x-rays 7/2019 -  Impression         1.  Moderate right acromioclavicular osteoarthrosis.  2.  Mild glenohumeral osteoarthrosis.  3.  No acute fracture or dislocation.          Current Outpatient Medications   Medication Sig Dispense Refill   • sulindac (CLINORIL) 150 MG Tab 1 tab po bid prn joint pain, take with food 60 Tablet 0   • cyclosporin (RESTASIS) 0.05 % ophthalmic emulsion Administer 1 Drop into both eyes 2 times a day.     • alendronate (FOSAMAX) 70 MG Tab TAKE 1 TABLET BY MOUTH ONCE A WEEK ON  WEDNESDAY 12 tablet 1   • tamsulosin (FLOMAX) 0.4 MG capsule Take 0.4 mg by mouth 1/2 hour after breakfast.     • Testosterone 20 % Cream by Does not apply route.     • docusate sodium 100 MG Cap Take 100 mg by mouth 2 Times a Day. 60 Cap    • thyroid (ARMOUR THYROID) 60 MG Tab Take 150 mg by mouth every day.     • estradiol (ESTRACE) 0.1 MG/GM vaginal cream Insert 1 g in vagina every evening.     • Cholecalciferol (VITAMIN D) 2000 units Cap Take 2,000 Units by mouth every day.     • acetaminophen (TYLENOL) 500 MG Tab Take 1,500 mg by mouth every 6 hours as needed for Moderate Pain.     • trazodone (DESYREL) 100 MG TABS Take 50 mg by mouth every evening.     • Multiple Vitamins-Minerals (EYE VITAMINS PO) Take 1 Tab by mouth every day.       No current facility-administered medications for this visit.     She  has a past medical history of  Anemia, Anesthesia, Arthritis (02/06/2018), Autoimmune hepatitis (HCC), Chronic diarrhea, Chronic UTI, Depression, Hiatus hernia syndrome, History of cataract surgery, Hypothyroid, Interstitial cystitis, Other specified disorder of intestines, Pain, Pneumonia, Urinary bladder disorder, and Varicose veins.    ROS   Other than what is mentioned in HPI or physical exam, there is no history of headaches, double vision or blurred vision. No temporal tenderness or jaw claudication. No trouble swallowing difficulties or sore throats.  No chest complaints including chest pain, cough or sputum production. No GI complaints including nausea, vomiting, change in bowel habits, or past peptic ulcer disease. No history of blood in the stools. No urinary complaints including dysuria or frequency. No history of alopecia, photosensitivity, oral ulcerations, Raynaud's phenomena.       Objective:     /60   Pulse 88   Temp 36.3 °C (97.3 °F) (Temporal)   Resp 12   Wt 63 kg (139 lb)   SpO2 96%  Body mass index is 21.77 kg/m².   Physical Exam:    Constitutional: Alert and oriented X3, patient is talkative with good eye contact.Skin: Warm, dry, good turgor, no rashes in visible areas.Eye: Equal, round and reactive, conjunctiva clear, lids normal EOM intactENMT: Lips without lesions, good dentition, no oropharyngeal ulcers, moist buccal mucosa, pinna without deformityNeck: Trachea midline, no masses, no thyromegaly.Lymph:  No cervical lymphadenopathy, no axillary lymphadenopathy, no supraclavicular lymphadenopathyRespiratory: Unlabored respiratory effort, lungs clear to auscultation, no wheezes, no ronchi.Cardiovascular: Normal S1, S2, no murmur, no edema.Abdomen: Soft, non-tender, no masses, no hepatosplenomegaly.Psych: Alert and oriented x3, normal affect and mood.Neuro: Cranial nerves 2-12 are grossly intact, no loss of sensation LEExt:no joint laxity noted in bilateral arms, no joint laxity noted in bilateral legs, some  Heberden's nodes on DIP joints, shoulders full range of motion without limitations    Lab Results   Component Value Date/Time    SODIUM 137 08/11/2021 09:52 AM    POTASSIUM 4.1 08/11/2021 09:52 AM    CHLORIDE 102 08/11/2021 09:52 AM    CO2 24 08/11/2021 09:52 AM    GLUCOSE 85 08/11/2021 09:52 AM    BUN 16 08/11/2021 09:52 AM    CREATININE 0.78 08/11/2021 09:52 AM    CREATININE 0.9 01/04/2005 12:05 PM      Lab Results   Component Value Date/Time    WBC 4.1 (L) 08/11/2021 09:52 AM    RBC 4.35 08/11/2021 09:52 AM    HEMOGLOBIN 13.7 08/11/2021 09:52 AM    HEMATOCRIT 41.8 08/11/2021 09:52 AM    MCV 96.1 08/11/2021 09:52 AM    MCH 31.5 08/11/2021 09:52 AM    MCHC 32.8 (L) 08/11/2021 09:52 AM    MPV 10.6 08/11/2021 09:52 AM    NEUTSPOLYS 53.10 08/11/2021 09:52 AM    LYMPHOCYTES 31.50 08/11/2021 09:52 AM    MONOCYTES 11.60 08/11/2021 09:52 AM    EOSINOPHILS 2.40 08/11/2021 09:52 AM    BASOPHILS 1.20 08/11/2021 09:52 AM      Lab Results   Component Value Date/Time    CALCIUM 9.7 08/11/2021 09:52 AM    ASTSGOT 19 08/11/2021 09:52 AM    ALTSGPT 16 08/11/2021 09:52 AM    ALKPHOSPHAT 93 08/11/2021 09:52 AM    TBILIRUBIN 0.4 08/11/2021 09:52 AM    ALBUMIN 4.4 08/11/2021 09:52 AM    ALBUMIN 4.29 03/05/2013 09:00 AM    TOTPROTEIN 7.1 08/11/2021 09:52 AM    TOTPROTEIN 7.60 03/05/2013 09:00 AM     Lab Results   Component Value Date/Time    URICACID 2.7 09/08/2020 12:56 PM    RHEUMFACTN 8 02/27/2013 10:56 AM    CCPANTIBODY 3 02/27/2015 08:44 AM    ANTINUCAB None Detected 02/27/2015 08:44 AM     Lab Results   Component Value Date/Time    ANTISSBSJ 0 08/05/2021 01:04 PM     Lab Results   Component Value Date/Time    SEDRATEWES 7 08/05/2021 01:04 PM     Lab Results   Component Value Date/Time    HBA1C 5.5 05/19/2014 09:25 AM     Lab Results   Component Value Date/Time    CPKTOTAL 84 11/30/2016 10:32 AM     Lab Results   Component Value Date/Time    ANTIMITOCHO 2.8 08/05/2021 01:04 PM    FACTIN 13 08/05/2021 01:04 PM     Assessment and  Plan:     1. Primary osteoarthritis involving multiple joints  Retrial of sulindac 150 mg p.o. twice daily as needed with food  Today check renal function and liver functions to assure stability  - sulindac (CLINORIL) 150 MG Tab; 1 tab po bid prn joint pain, take with food  Dispense: 60 Tablet; Refill: 0  - DX-CHEST-2 VIEWS; Future  - CBC WITH DIFFERENTIAL; Future  - Comp Metabolic Panel; Future  - Sed Rate; Future  - CRP QUANTITIVE (NON-CARDIAC); Future  - Referral to Neurology    2. Senile osteoporosis  Last DEXA September 2020 Next DEXA due September 2022 will order at next visit.  Patient currently on Fosamax 70 mg p.o. weekly   continue calcium citrate 1200 mg by mouth daily and vitamin D about 2000 units by mouth daily and magnesium 200 mg by mouth daily  - sulindac (CLINORIL) 150 MG Tab; 1 tab po bid prn joint pain, take with food  Dispense: 60 Tablet; Refill: 0  - DX-CHEST-2 VIEWS; Future  - CBC WITH DIFFERENTIAL; Future  - Comp Metabolic Panel; Future  - Sed Rate; Future  - CRP QUANTITIVE (NON-CARDIAC); Future  - Referral to Neurology    3. Long term use of alendronate therapy (Fosamax)  Patient needs monitoring labs every 6 months, patient due for labs now, labs ordered for patient  - sulindac (CLINORIL) 150 MG Tab; 1 tab po bid prn joint pain, take with food  Dispense: 60 Tablet; Refill: 0  - DX-CHEST-2 VIEWS; Future  - CBC WITH DIFFERENTIAL; Future  - Comp Metabolic Panel; Future  - Sed Rate; Future  - CRP QUANTITIVE (NON-CARDIAC); Future  - Referral to Neurology    4. Hypothyroidism, unspecified type  Followed by patients PCP, can exacerbate joint pain, I recommend monitoring thyroid on a regular basis to assure it is not contributing to the patient's joint pain  - sulindac (CLINORIL) 150 MG Tab; 1 tab po bid prn joint pain, take with food  Dispense: 60 Tablet; Refill: 0  - DX-CHEST-2 VIEWS; Future  - CBC WITH DIFFERENTIAL; Future  - Comp Metabolic Panel; Future  - Sed Rate; Future  - CRP QUANTITIVE  (NON-CARDIAC); Future  - Referral to Neurology      5. NSAID long-term use  Do trial of sulindac 150 mg p.o. twice daily as needed with food, patient needs monitoring labs every 6 months, today we will check CBC and CMP to assure stability of liver functions and renal functions and CBC  - sulindac (CLINORIL) 150 MG Tab; 1 tab po bid prn joint pain, take with food  Dispense: 60 Tablet; Refill: 0  - DX-CHEST-2 VIEWS; Future  - CBC WITH DIFFERENTIAL; Future  - Comp Metabolic Panel; Future  - Sed Rate; Future  - CRP QUANTITIVE (NON-CARDIAC); Future    6. Anxiety  At last visit August 2021 we submitted a referral to behavioral health which patient did not follow through    7. Cognitive change  Patient feels she might be having some memory problems, refer to neurology for memory testing.  - Referral to Neurology    Followup: Return in about 6 months (around 6/14/2022). or sooner prn    Susan Menendez  was seen 30 minutes face-to-face of which more than 50% of the time was spent counseling the patient (excluding time for procedures)  regarding  rheumatological condition and care. Therapy was discussed in detail.      Please note that this dictation was created using voice recognition software. I have made every reasonable attempt to correct obvious errors, but I expect that there are errors of grammar and possibly content that I did not discover before finalizing the note.

## 2021-12-15 ENCOUNTER — HOSPITAL ENCOUNTER (OUTPATIENT)
Dept: LAB | Facility: MEDICAL CENTER | Age: 86
End: 2021-12-15
Attending: INTERNAL MEDICINE
Payer: MEDICARE

## 2021-12-15 DIAGNOSIS — M15.9 PRIMARY OSTEOARTHRITIS INVOLVING MULTIPLE JOINTS: ICD-10-CM

## 2021-12-15 DIAGNOSIS — Z79.1 NSAID LONG-TERM USE: ICD-10-CM

## 2021-12-15 DIAGNOSIS — Z79.83 LONG TERM USE OF ALENDRONATE THERAPY (FOSAMAX): ICD-10-CM

## 2021-12-15 DIAGNOSIS — M81.0 SENILE OSTEOPOROSIS: ICD-10-CM

## 2021-12-15 DIAGNOSIS — E03.9 HYPOTHYROIDISM, UNSPECIFIED TYPE: ICD-10-CM

## 2021-12-15 DIAGNOSIS — K75.4 AUTOIMMUNE HEPATITIS (HCC): ICD-10-CM

## 2021-12-15 LAB
ALBUMIN SERPL BCP-MCNC: 4.5 G/DL (ref 3.2–4.9)
ALBUMIN/GLOB SERPL: 1.6 G/DL
ALP SERPL-CCNC: 69 U/L (ref 30–99)
ALT SERPL-CCNC: 14 U/L (ref 2–50)
ANION GAP SERPL CALC-SCNC: 12 MMOL/L (ref 7–16)
AST SERPL-CCNC: 20 U/L (ref 12–45)
BASOPHILS # BLD AUTO: 1.5 % (ref 0–1.8)
BASOPHILS # BLD: 0.06 K/UL (ref 0–0.12)
BILIRUB SERPL-MCNC: 0.5 MG/DL (ref 0.1–1.5)
BUN SERPL-MCNC: 20 MG/DL (ref 8–22)
CALCIUM SERPL-MCNC: 10 MG/DL (ref 8.5–10.5)
CHLORIDE SERPL-SCNC: 103 MMOL/L (ref 96–112)
CO2 SERPL-SCNC: 24 MMOL/L (ref 20–33)
CREAT SERPL-MCNC: 0.82 MG/DL (ref 0.5–1.4)
CRP SERPL HS-MCNC: <0.3 MG/DL (ref 0–0.75)
EOSINOPHIL # BLD AUTO: 0.12 K/UL (ref 0–0.51)
EOSINOPHIL NFR BLD: 2.9 % (ref 0–6.9)
ERYTHROCYTE [DISTWIDTH] IN BLOOD BY AUTOMATED COUNT: 49.9 FL (ref 35.9–50)
ERYTHROCYTE [SEDIMENTATION RATE] IN BLOOD BY WESTERGREN METHOD: 5 MM/HOUR (ref 0–25)
GLOBULIN SER CALC-MCNC: 2.8 G/DL (ref 1.9–3.5)
GLUCOSE SERPL-MCNC: 86 MG/DL (ref 65–99)
HCT VFR BLD AUTO: 42.5 % (ref 37–47)
HGB BLD-MCNC: 14.1 G/DL (ref 12–16)
IMM GRANULOCYTES # BLD AUTO: 0.01 K/UL (ref 0–0.11)
IMM GRANULOCYTES NFR BLD AUTO: 0.2 % (ref 0–0.9)
LYMPHOCYTES # BLD AUTO: 1.55 K/UL (ref 1–4.8)
LYMPHOCYTES NFR BLD: 37.8 % (ref 22–41)
MCH RBC QN AUTO: 32 PG (ref 27–33)
MCHC RBC AUTO-ENTMCNC: 33.2 G/DL (ref 33.6–35)
MCV RBC AUTO: 96.6 FL (ref 81.4–97.8)
MONOCYTES # BLD AUTO: 0.47 K/UL (ref 0–0.85)
MONOCYTES NFR BLD AUTO: 11.5 % (ref 0–13.4)
NEUTROPHILS # BLD AUTO: 1.89 K/UL (ref 2–7.15)
NEUTROPHILS NFR BLD: 46.1 % (ref 44–72)
NRBC # BLD AUTO: 0 K/UL
NRBC BLD-RTO: 0 /100 WBC
PLATELET # BLD AUTO: 302 K/UL (ref 164–446)
PMV BLD AUTO: 10.5 FL (ref 9–12.9)
POTASSIUM SERPL-SCNC: 4.4 MMOL/L (ref 3.6–5.5)
PROT SERPL-MCNC: 7.3 G/DL (ref 6–8.2)
RBC # BLD AUTO: 4.4 M/UL (ref 4.2–5.4)
SODIUM SERPL-SCNC: 139 MMOL/L (ref 135–145)
WBC # BLD AUTO: 4.1 K/UL (ref 4.8–10.8)

## 2021-12-15 PROCEDURE — 36415 COLL VENOUS BLD VENIPUNCTURE: CPT

## 2021-12-15 PROCEDURE — 80053 COMPREHEN METABOLIC PANEL: CPT

## 2021-12-15 PROCEDURE — 85652 RBC SED RATE AUTOMATED: CPT

## 2021-12-15 PROCEDURE — 86140 C-REACTIVE PROTEIN: CPT

## 2021-12-15 PROCEDURE — 85025 COMPLETE CBC W/AUTO DIFF WBC: CPT

## 2022-01-13 PROBLEM — M79.672 PAIN OF LEFT FOOT: Status: ACTIVE | Noted: 2022-01-13

## 2022-02-25 ENCOUNTER — HOSPITAL ENCOUNTER (OUTPATIENT)
Dept: RADIOLOGY | Facility: MEDICAL CENTER | Age: 87
End: 2022-02-25
Attending: INTERNAL MEDICINE
Payer: MEDICARE

## 2022-02-25 DIAGNOSIS — M15.9 PRIMARY OSTEOARTHRITIS INVOLVING MULTIPLE JOINTS: ICD-10-CM

## 2022-02-25 DIAGNOSIS — M81.0 SENILE OSTEOPOROSIS: ICD-10-CM

## 2022-02-25 DIAGNOSIS — Z79.1 NSAID LONG-TERM USE: ICD-10-CM

## 2022-02-25 DIAGNOSIS — Z79.83 LONG TERM USE OF ALENDRONATE THERAPY (FOSAMAX): ICD-10-CM

## 2022-02-25 DIAGNOSIS — E03.9 HYPOTHYROIDISM, UNSPECIFIED TYPE: ICD-10-CM

## 2022-02-25 DIAGNOSIS — K75.4 AUTOIMMUNE HEPATITIS (HCC): ICD-10-CM

## 2022-02-25 PROCEDURE — 71046 X-RAY EXAM CHEST 2 VIEWS: CPT

## 2022-02-28 ENCOUNTER — TELEPHONE (OUTPATIENT)
Dept: RHEUMATOLOGY | Facility: MEDICAL CENTER | Age: 87
End: 2022-02-28
Payer: MEDICARE

## 2022-02-28 NOTE — TELEPHONE ENCOUNTER
Please notify patient that we received the results of her chest x-ray and it looks like she might have some COPD which could be causing some of her shortness of breath  Strongly recommend patient contact her primary care doctor to see about treatment of COPD, patient may benefit from inhalers when she becomes short of breath

## 2022-03-03 ENCOUNTER — HOSPITAL ENCOUNTER (OUTPATIENT)
Dept: LAB | Facility: MEDICAL CENTER | Age: 87
End: 2022-03-03
Attending: FAMILY MEDICINE
Payer: MEDICARE

## 2022-03-03 LAB
ALBUMIN SERPL BCP-MCNC: 4.7 G/DL (ref 3.2–4.9)
ALBUMIN/GLOB SERPL: 2 G/DL
ALP SERPL-CCNC: 72 U/L (ref 30–99)
ALT SERPL-CCNC: 17 U/L (ref 2–50)
ANION GAP SERPL CALC-SCNC: 13 MMOL/L (ref 7–16)
AST SERPL-CCNC: 23 U/L (ref 12–45)
BASOPHILS # BLD AUTO: 1.1 % (ref 0–1.8)
BASOPHILS # BLD: 0.05 K/UL (ref 0–0.12)
BILIRUB SERPL-MCNC: 0.2 MG/DL (ref 0.1–1.5)
BUN SERPL-MCNC: 21 MG/DL (ref 8–22)
CALCIUM SERPL-MCNC: 9.2 MG/DL (ref 8.5–10.5)
CHLORIDE SERPL-SCNC: 102 MMOL/L (ref 96–112)
CO2 SERPL-SCNC: 21 MMOL/L (ref 20–33)
CREAT SERPL-MCNC: 0.68 MG/DL (ref 0.5–1.4)
EOSINOPHIL # BLD AUTO: 0.14 K/UL (ref 0–0.51)
EOSINOPHIL NFR BLD: 3.2 % (ref 0–6.9)
ERYTHROCYTE [DISTWIDTH] IN BLOOD BY AUTOMATED COUNT: 48.5 FL (ref 35.9–50)
GLOBULIN SER CALC-MCNC: 2.4 G/DL (ref 1.9–3.5)
GLUCOSE SERPL-MCNC: 109 MG/DL (ref 65–99)
HCT VFR BLD AUTO: 39.2 % (ref 37–47)
HGB BLD-MCNC: 13.1 G/DL (ref 12–16)
IMM GRANULOCYTES # BLD AUTO: 0.01 K/UL (ref 0–0.11)
IMM GRANULOCYTES NFR BLD AUTO: 0.2 % (ref 0–0.9)
LYMPHOCYTES # BLD AUTO: 1.51 K/UL (ref 1–4.8)
LYMPHOCYTES NFR BLD: 34.2 % (ref 22–41)
MCH RBC QN AUTO: 31.8 PG (ref 27–33)
MCHC RBC AUTO-ENTMCNC: 33.4 G/DL (ref 33.6–35)
MCV RBC AUTO: 95.1 FL (ref 81.4–97.8)
MONOCYTES # BLD AUTO: 0.42 K/UL (ref 0–0.85)
MONOCYTES NFR BLD AUTO: 9.5 % (ref 0–13.4)
NEUTROPHILS # BLD AUTO: 2.29 K/UL (ref 2–7.15)
NEUTROPHILS NFR BLD: 51.8 % (ref 44–72)
NRBC # BLD AUTO: 0 K/UL
NRBC BLD-RTO: 0 /100 WBC
PLATELET # BLD AUTO: 274 K/UL (ref 164–446)
PMV BLD AUTO: 10.4 FL (ref 9–12.9)
POTASSIUM SERPL-SCNC: 4.3 MMOL/L (ref 3.6–5.5)
PROT SERPL-MCNC: 7.1 G/DL (ref 6–8.2)
RBC # BLD AUTO: 4.12 M/UL (ref 4.2–5.4)
SODIUM SERPL-SCNC: 136 MMOL/L (ref 135–145)
T3 SERPL-MCNC: 81.9 NG/DL (ref 60–181)
T4 SERPL-MCNC: 7.9 UG/DL (ref 4–12)
TSH SERPL DL<=0.005 MIU/L-ACNC: 2.06 UIU/ML (ref 0.38–5.33)
WBC # BLD AUTO: 4.4 K/UL (ref 4.8–10.8)

## 2022-03-03 PROCEDURE — 36415 COLL VENOUS BLD VENIPUNCTURE: CPT

## 2022-03-03 PROCEDURE — 84436 ASSAY OF TOTAL THYROXINE: CPT

## 2022-03-03 PROCEDURE — 84480 ASSAY TRIIODOTHYRONINE (T3): CPT

## 2022-03-03 PROCEDURE — 85025 COMPLETE CBC W/AUTO DIFF WBC: CPT

## 2022-03-03 PROCEDURE — 84443 ASSAY THYROID STIM HORMONE: CPT

## 2022-03-03 PROCEDURE — 80053 COMPREHEN METABOLIC PANEL: CPT

## 2022-03-16 PROBLEM — M79.672 FOOT PAIN, LEFT: Status: ACTIVE | Noted: 2022-03-16

## 2022-03-29 ENCOUNTER — APPOINTMENT (OUTPATIENT)
Dept: NEUROLOGY | Facility: MEDICAL CENTER | Age: 87
End: 2022-03-29
Attending: PSYCHIATRY & NEUROLOGY
Payer: MEDICARE

## 2022-04-15 ENCOUNTER — HOSPITAL ENCOUNTER (OUTPATIENT)
Dept: LAB | Facility: MEDICAL CENTER | Age: 87
End: 2022-04-15
Attending: INTERNAL MEDICINE
Payer: MEDICARE

## 2022-04-15 LAB
ALBUMIN SERPL BCP-MCNC: 4.7 G/DL (ref 3.2–4.9)
ALBUMIN/GLOB SERPL: 2 G/DL
ALP SERPL-CCNC: 75 U/L (ref 30–99)
ALT SERPL-CCNC: 13 U/L (ref 2–50)
ANION GAP SERPL CALC-SCNC: 13 MMOL/L (ref 7–16)
AST SERPL-CCNC: 17 U/L (ref 12–45)
BASOPHILS # BLD AUTO: 1.3 % (ref 0–1.8)
BASOPHILS # BLD: 0.06 K/UL (ref 0–0.12)
BILIRUB SERPL-MCNC: 0.3 MG/DL (ref 0.1–1.5)
BUN SERPL-MCNC: 17 MG/DL (ref 8–22)
CALCIUM SERPL-MCNC: 9.4 MG/DL (ref 8.5–10.5)
CHLORIDE SERPL-SCNC: 99 MMOL/L (ref 96–112)
CO2 SERPL-SCNC: 22 MMOL/L (ref 20–33)
CREAT SERPL-MCNC: 0.73 MG/DL (ref 0.5–1.4)
EOSINOPHIL # BLD AUTO: 0.09 K/UL (ref 0–0.51)
EOSINOPHIL NFR BLD: 1.9 % (ref 0–6.9)
ERYTHROCYTE [DISTWIDTH] IN BLOOD BY AUTOMATED COUNT: 50.4 FL (ref 35.9–50)
FASTING STATUS PATIENT QL REPORTED: NORMAL
GFR SERPLBLD CREATININE-BSD FMLA CKD-EPI: 81 ML/MIN/1.73 M 2
GLOBULIN SER CALC-MCNC: 2.4 G/DL (ref 1.9–3.5)
GLUCOSE SERPL-MCNC: 80 MG/DL (ref 65–99)
HCT VFR BLD AUTO: 40.8 % (ref 37–47)
HGB BLD-MCNC: 13.1 G/DL (ref 12–16)
IMM GRANULOCYTES # BLD AUTO: 0.02 K/UL (ref 0–0.11)
IMM GRANULOCYTES NFR BLD AUTO: 0.4 % (ref 0–0.9)
LYMPHOCYTES # BLD AUTO: 1.74 K/UL (ref 1–4.8)
LYMPHOCYTES NFR BLD: 36.6 % (ref 22–41)
MCH RBC QN AUTO: 31.3 PG (ref 27–33)
MCHC RBC AUTO-ENTMCNC: 32.1 G/DL (ref 33.6–35)
MCV RBC AUTO: 97.4 FL (ref 81.4–97.8)
MONOCYTES # BLD AUTO: 0.45 K/UL (ref 0–0.85)
MONOCYTES NFR BLD AUTO: 9.5 % (ref 0–13.4)
NEUTROPHILS # BLD AUTO: 2.39 K/UL (ref 2–7.15)
NEUTROPHILS NFR BLD: 50.3 % (ref 44–72)
NRBC # BLD AUTO: 0 K/UL
NRBC BLD-RTO: 0 /100 WBC
PLATELET # BLD AUTO: 265 K/UL (ref 164–446)
PMV BLD AUTO: 10.8 FL (ref 9–12.9)
POTASSIUM SERPL-SCNC: 4.2 MMOL/L (ref 3.6–5.5)
PROT SERPL-MCNC: 7.1 G/DL (ref 6–8.2)
RBC # BLD AUTO: 4.19 M/UL (ref 4.2–5.4)
SODIUM SERPL-SCNC: 134 MMOL/L (ref 135–145)
TSH SERPL DL<=0.005 MIU/L-ACNC: 1.23 UIU/ML (ref 0.38–5.33)
WBC # BLD AUTO: 4.8 K/UL (ref 4.8–10.8)

## 2022-04-15 PROCEDURE — 36415 COLL VENOUS BLD VENIPUNCTURE: CPT

## 2022-04-15 PROCEDURE — 84443 ASSAY THYROID STIM HORMONE: CPT | Mod: GA

## 2022-04-15 PROCEDURE — 80053 COMPREHEN METABOLIC PANEL: CPT

## 2022-04-15 PROCEDURE — 85025 COMPLETE CBC W/AUTO DIFF WBC: CPT

## 2022-06-14 ENCOUNTER — APPOINTMENT (OUTPATIENT)
Dept: RHEUMATOLOGY | Facility: MEDICAL CENTER | Age: 87
End: 2022-06-14
Payer: MEDICARE

## 2022-08-01 ENCOUNTER — OFFICE VISIT (OUTPATIENT)
Dept: RHEUMATOLOGY | Facility: MEDICAL CENTER | Age: 87
End: 2022-08-01
Payer: MEDICARE

## 2022-08-01 VITALS
DIASTOLIC BLOOD PRESSURE: 60 MMHG | WEIGHT: 142 LBS | OXYGEN SATURATION: 98 % | SYSTOLIC BLOOD PRESSURE: 130 MMHG | RESPIRATION RATE: 14 BRPM | BODY MASS INDEX: 22.24 KG/M2 | TEMPERATURE: 97.2 F | HEART RATE: 76 BPM

## 2022-08-01 DIAGNOSIS — E03.9 HYPOTHYROIDISM, UNSPECIFIED TYPE: ICD-10-CM

## 2022-08-01 DIAGNOSIS — R41.89 COGNITIVE CHANGE: ICD-10-CM

## 2022-08-01 DIAGNOSIS — M81.0 SENILE OSTEOPOROSIS: ICD-10-CM

## 2022-08-01 DIAGNOSIS — Z79.1 NSAID LONG-TERM USE: ICD-10-CM

## 2022-08-01 DIAGNOSIS — Z79.83 LONG TERM USE OF ALENDRONATE THERAPY (FOSAMAX): ICD-10-CM

## 2022-08-01 DIAGNOSIS — J44.9 CHRONIC OBSTRUCTIVE PULMONARY DISEASE, UNSPECIFIED COPD TYPE (HCC): ICD-10-CM

## 2022-08-01 DIAGNOSIS — M15.9 PRIMARY OSTEOARTHRITIS INVOLVING MULTIPLE JOINTS: ICD-10-CM

## 2022-08-01 PROCEDURE — 99214 OFFICE O/P EST MOD 30 MIN: CPT | Performed by: INTERNAL MEDICINE

## 2022-08-01 RX ORDER — ALENDRONATE SODIUM 70 MG/1
TABLET ORAL
Qty: 12 TABLET | Refills: 0 | Status: SHIPPED | OUTPATIENT
Start: 2022-08-01 | End: 2022-10-31

## 2022-08-01 ASSESSMENT — FIBROSIS 4 INDEX: FIB4 SCORE: 1.49

## 2022-08-01 NOTE — PROGRESS NOTES
"Chief Complaint- joint pain     Subjective:   Susan Menendez is a 86 y.o. female here today for follow up of rheumatological issues  Of note, patient likes to be called \"Johnathan\"     This is a follow-up visit for this patient who we see in this clinic for DJD and osteoporosis.  Patient does have a distant remote history of PMR for which patient CRP and ESR have been normal.    Patient comes in today complaining of joint pain currently on sulindac 150 mg p.o. twice daily as needed.  We discussed about possibly switching medication patient opts out would like to stay with the sulindac.    We also follow patient for osteoporosis, patient's last DEXA September 2020 we will schedule DEXA for September 2022.  Patient currently on Fosamax 70 mg p.o. weekly since March 2018, patient states that she is forgotten to refill her Fosamax and has been out now for about 2 weeks.  Patient denies any fractures since last visit.      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  Additional comorbidities include a diagnosis of autoimmune hepatitis and interstitial cystitis.  Patient also states she had a history of fibromyalgia in the past.  Patient also shares today that she has a past diagnosis of PTSD starting with her sons strokes, patient has not sought any treatment in the past.  Patient also history of low back surgery with benefit in December 2018.     Of note in August 2021 we put a referral for behavioral health for patient anxiety which patient did not follow through     At last visit patient states she feels she may be having some memory problems, status post referral to neurology for cognitive testing for which patient did not follow through.         AMA neg 8/2021  F-actin neg 8/2021  SSA neg 8/2021  SSB neg 8/2021   Uric acid 3.4 2/2013; Uric acid 2.7 9/2020  RF " neg 2/2013  CCP neg 2/2015  MARLEE neg 2/2015  DEXA 10/2014 T scores -2.3, -1.2  DEXA 11/8/2016 T scores -0.8, -2.3    FRAX 11/8/2016 major osteoporotic fracture risk is 23.8%, hip fracture risk 8.4%  DEXA 3/7/2018 T scores -0.6, -2.1  FRAX 3/7/2018 major osteoporotic fracture risk 26.1%, hip fracture risk 9.5%  DEXA 9/9/2020 T scores -4.7 (forearm), -2.4  FRAX 9/9/2020 major osteoporotic fracture risk 26.8%, hip fracture risk 10.5%     Addendum 11/29/2022  DEXA 11/29/2022 T scores -4.7, -2.1  FRAX 11/29/2022 major osteoporotic fracture risk 25.2%, hip fracture risk 8.8%    Patient has been on Fosamax since March 2018    Hand x-rays 9/2020-indicates  DJD  MRI LS spine 10/2016-indicates DJD and mild to moderate neural foraminal narrowing   Left shoulder x-rays 7/2019-   Impression       1.  Mild degenerative change of LEFT shoulder.  2.  No fracture or dislocation.      Right shoulder x-rays 7/2019 -  Impression       1.  Moderate right acromioclavicular osteoarthrosis.  2.  Mild glenohumeral osteoarthrosis.  3.  No acute fracture or dislocation.             Current Outpatient Medications   Medication Sig Dispense Refill    alendronate (FOSAMAX) 70 MG Tab TAKE 1 TABLET BY MOUTH ONCE A WEEK ON WEDNESDAY 12 Tablet 0    losartan (COZAAR) 25 MG Tab Take 25 mg by mouth every day.      LEVOTHYROXINE SODIUM PO Take  by mouth.      sulindac (CLINORIL) 150 MG Tab 1 tab po bid prn joint pain, take with food 60 Tablet 0    tamsulosin (FLOMAX) 0.4 MG capsule Take 0.4 mg by mouth 1/2 hour after breakfast.      Testosterone 20 % Cream by Does not apply route.      estradiol (ESTRACE) 0.1 MG/GM vaginal cream Insert 1 g in vagina every evening.      Cholecalciferol (VITAMIN D) 2000 units Cap Take 2,000 Units by mouth every day.      acetaminophen (TYLENOL) 500 MG Tab Take 1,500 mg by mouth every 6 hours as needed for Moderate Pain.      trazodone (DESYREL) 100 MG TABS Take 50 mg by mouth every evening.      gabapentin (NEURONTIN) 300  MG Cap Take 1 po qhs 7 Capsule 0    hydrOXYzine pamoate (VISTARIL) 50 MG Cap Take 1 Capsule by mouth 3 times a day as needed for Itching (nausea). 20 Capsule 1    cyclosporin (RESTASIS) 0.05 % ophthalmic emulsion Administer 1 Drop into both eyes 2 times a day.       No current facility-administered medications for this visit.     She  has a past medical history of Anemia, Anesthesia, Arthritis (02/06/2018), Autoimmune hepatitis (HCC), Chronic diarrhea, Chronic UTI, COPD (chronic obstructive pulmonary disease) (HCC), Depression, Hiatus hernia syndrome, History of cataract surgery, Hypothyroid, Interstitial cystitis, Other specified disorder of intestines, Pain, Pneumonia, Urinary bladder disorder, and Varicose veins.    ROS   Other than what is mentioned in HPI or physical exam, there is no history of headaches, double vision or blurred vision. No temporal tenderness or jaw claudication. No trouble swallowing difficulties or sore throats.  No chest complaints including chest pain, cough or sputum production. No GI complaints including nausea, vomiting, change in bowel habits, or past peptic ulcer disease. No history of blood in the stools. No urinary complaints including dysuria or frequency. No history of alopecia, photosensitivity, oral ulcerations, Raynaud's phenomena.       Objective:     /60   Pulse 76   Temp 36.2 °C (97.2 °F) (Temporal)   Resp 14   Wt 64.4 kg (142 lb)   SpO2 98%  Body mass index is 22.24 kg/m².   Physical Exam:    Constitutional: Alert and oriented X3, patient is talkative with good eye contact.Skin: Warm, dry, good turgor, no rashes in visible areas.Eye: Equal, round and reactive, conjunctiva clear, lids normal EOM intactENMT: Lips without lesions, good dentition, no oropharyngeal ulcers, moist buccal mucosa, pinna without deformityNeck: Trachea midline, no masses, no thyromegaly.Lymph:  No cervical lymphadenopathy, no axillary lymphadenopathy, no supraclavicular  lymphadenopathyRespiratory: Unlabored respiratory effort, lungs clear to auscultation, no wheezes, no ronchi.Cardiovascular: Normal S1, S2, regular rate and rhythm did not appreciate any murmurs rubs or gallops today.Abdomen: Soft, non-tender, no masses, no hepatosplenomegaly.Psych: Alert and oriented x3, normal affect and mood.Neuro: Cranial nerves 2-12 are grossly intact, no loss of sensation LEExt:no joint laxity noted in bilateral arms, no joint laxity noted in bilateral legs, Heberden's nodes on DIP joints, shoulders full range of motion without limitations, no flexion contractures of the elbows no nodules no tophi, gait without antalgia without foot drop, no real dowagers hump    Lab Results   Component Value Date/Time    SODIUM 134 (L) 04/15/2022 02:07 PM    POTASSIUM 4.2 04/15/2022 02:07 PM    CHLORIDE 99 04/15/2022 02:07 PM    CO2 22 04/15/2022 02:07 PM    GLUCOSE 80 04/15/2022 02:07 PM    BUN 17 04/15/2022 02:07 PM    CREATININE 0.73 04/15/2022 02:07 PM    CREATININE 0.9 01/04/2005 12:05 PM      Lab Results   Component Value Date/Time    WBC 4.8 04/15/2022 02:07 PM    RBC 4.19 (L) 04/15/2022 02:07 PM    HEMOGLOBIN 13.1 04/15/2022 02:07 PM    HEMATOCRIT 40.8 04/15/2022 02:07 PM    MCV 97.4 04/15/2022 02:07 PM    MCH 31.3 04/15/2022 02:07 PM    MCHC 32.1 (L) 04/15/2022 02:07 PM    MPV 10.8 04/15/2022 02:07 PM    NEUTSPOLYS 50.30 04/15/2022 02:07 PM    LYMPHOCYTES 36.60 04/15/2022 02:07 PM    MONOCYTES 9.50 04/15/2022 02:07 PM    EOSINOPHILS 1.90 04/15/2022 02:07 PM    BASOPHILS 1.30 04/15/2022 02:07 PM      Lab Results   Component Value Date/Time    CALCIUM 9.4 04/15/2022 02:07 PM    ASTSGOT 17 04/15/2022 02:07 PM    ALTSGPT 13 04/15/2022 02:07 PM    ALKPHOSPHAT 75 04/15/2022 02:07 PM    TBILIRUBIN 0.3 04/15/2022 02:07 PM    ALBUMIN 4.7 04/15/2022 02:07 PM    ALBUMIN 4.29 03/05/2013 09:00 AM    TOTPROTEIN 7.1 04/15/2022 02:07 PM    TOTPROTEIN 7.60 03/05/2013 09:00 AM     Lab Results   Component Value  Date/Time    URICACID 2.7 09/08/2020 12:56 PM    RHEUMFACTN 8 02/27/2013 10:56 AM    CCPANTIBODY 3 02/27/2015 08:44 AM    ANTINUCAB None Detected 02/27/2015 08:44 AM     Lab Results   Component Value Date/Time    ANTISSBSJ 0 08/05/2021 01:04 PM     Lab Results   Component Value Date/Time    SEDRATEWES 5 12/15/2021 08:19 AM     Lab Results   Component Value Date/Time    HBA1C 5.5 05/19/2014 09:25 AM     Lab Results   Component Value Date/Time    CPKTOTAL 84 11/30/2016 10:32 AM     Lab Results   Component Value Date/Time    ANTIMITOCHO 2.8 08/05/2021 01:04 PM    FACTIN 13 08/05/2021 01:04 PM     Assessment and Plan:     1. Primary osteoarthritis involving multiple joints  Long discussion with patient regarding treatment options we opted to stay with the sulindac 150 mg p.o. twice daily as needed,  - Comp Metabolic Panel; Future  - CBC WITH DIFFERENTIAL; Future  - Sed Rate; Future  - CRP QUANTITIVE (NON-CARDIAC); Future  - DS-BONE DENSITY STUDY (DEXA); Future  - alendronate (FOSAMAX) 70 MG Tab; TAKE 1 TABLET BY MOUTH ONCE A WEEK ON WEDNESDAY  Dispense: 12 Tablet; Refill: 0    2. NSAID long-term use  Patient on intermittent anti-inflammatory medications needs monitoring labs every 6 months, patient due for labs October 2022, labs ordered for patient  - Comp Metabolic Panel; Future  - CBC WITH DIFFERENTIAL; Future  - Sed Rate; Future  - CRP QUANTITIVE (NON-CARDIAC); Future  - DS-BONE DENSITY STUDY (DEXA); Future  - alendronate (FOSAMAX) 70 MG Tab; TAKE 1 TABLET BY MOUTH ONCE A WEEK ON WEDNESDAY  Dispense: 12 Tablet; Refill: 0    3. Senile osteoporosis  Last DEXA September 2020, patient needs DEXA September 2022, DEXA ordered for patient  Currently on Fosamax 70 mg p.o. weekly that was refilled for patient today   continue calcium citrate 1200 mg by mouth daily and vitamin D about 2000 units by mouth daily and magnesium 200 mg by mouth daily  Today we will also check vitamin D level  - Comp Metabolic Panel; Future  - CBC  WITH DIFFERENTIAL; Future  - Sed Rate; Future  - CRP QUANTITIVE (NON-CARDIAC); Future  - DS-BONE DENSITY STUDY (DEXA); Future  - alendronate (FOSAMAX) 70 MG Tab; TAKE 1 TABLET BY MOUTH ONCE A WEEK ON WEDNESDAY  Dispense: 12 Tablet; Refill: 0  - VITAMIN D,25 HYDROXY; Future    4. Long term use of alendronate therapy (Fosamax)  Currently on Fosamax 70 mg p.o. weekly, patient needs monitoring labs about every 6 months, patient due for labs October 2022, labs ordered for patient  - alendronate (FOSAMAX) 70 MG Tab; TAKE 1 TABLET BY MOUTH ONCE A WEEK ON WEDNESDAY  Dispense: 12 Tablet; Refill: 0    5. Chronic obstructive pulmonary disease, unspecified COPD type (HCC)  Patient shares today that she may have a diagnosis of COPD is trying get in touch with her pulmonologist at [facility] to continue the testing that she thought she needs to be doing.    6. Hypothyroidism, unspecified type  Followed by patients PCP, can exacerbate joint pain, I recommend monitoring thyroid on a regular basis to assure it is not contributing to the patient's joint pain    7. Cognitive change  We had submitted referral to neurology at last visit first patient did not follow through, encouraged patient to follow through with referral    Followup: Return in about 1 year (around 8/1/2023). or sooner nupur Menendez  was seen 30 minutes face-to-face of which more than 50% of the time was spent counseling the patient (excluding time for procedures)  regarding  rheumatological condition and care. Therapy was discussed in detail.      Please note that this dictation was created using voice recognition software. I have made every reasonable attempt to correct obvious errors, but I expect that there are errors of grammar and possibly content that I did not discover before finalizing the note.

## 2022-08-22 ENCOUNTER — HOSPITAL ENCOUNTER (OUTPATIENT)
Dept: LAB | Facility: MEDICAL CENTER | Age: 87
End: 2022-08-22
Attending: NURSE PRACTITIONER
Payer: MEDICARE

## 2022-08-22 LAB — ESTRADIOL SERPL-MCNC: 478 PG/ML

## 2022-08-22 PROCEDURE — 36415 COLL VENOUS BLD VENIPUNCTURE: CPT

## 2022-08-22 PROCEDURE — 82670 ASSAY OF TOTAL ESTRADIOL: CPT

## 2022-08-22 PROCEDURE — 84402 ASSAY OF FREE TESTOSTERONE: CPT

## 2022-08-22 PROCEDURE — 84270 ASSAY OF SEX HORMONE GLOBUL: CPT

## 2022-08-22 PROCEDURE — 84403 ASSAY OF TOTAL TESTOSTERONE: CPT

## 2022-09-04 LAB
SHBG SERPL-SCNC: 82 NMOL/L (ref 17–125)
TESTOST FREE SERPL-MCNC: 10 PG/ML (ref 0.6–3.8)
TESTOST SERPL-MCNC: 107 NG/DL (ref 5–32)

## 2022-09-06 ENCOUNTER — OFFICE VISIT (OUTPATIENT)
Dept: URGENT CARE | Facility: PHYSICIAN GROUP | Age: 87
End: 2022-09-06
Payer: MEDICARE

## 2022-09-06 VITALS
HEIGHT: 67 IN | WEIGHT: 143.6 LBS | OXYGEN SATURATION: 99 % | HEART RATE: 70 BPM | RESPIRATION RATE: 14 BRPM | DIASTOLIC BLOOD PRESSURE: 60 MMHG | TEMPERATURE: 96.8 F | SYSTOLIC BLOOD PRESSURE: 118 MMHG | BODY MASS INDEX: 22.54 KG/M2

## 2022-09-06 DIAGNOSIS — N30.10 INTERSTITIAL CYSTITIS: ICD-10-CM

## 2022-09-06 DIAGNOSIS — R30.0 DYSURIA: ICD-10-CM

## 2022-09-06 LAB
APPEARANCE UR: CLEAR
BILIRUB UR STRIP-MCNC: NEGATIVE MG/DL
COLOR UR AUTO: YELLOW
GLUCOSE UR STRIP.AUTO-MCNC: NEGATIVE MG/DL
KETONES UR STRIP.AUTO-MCNC: NEGATIVE MG/DL
LEUKOCYTE ESTERASE UR QL STRIP.AUTO: NEGATIVE
NITRITE UR QL STRIP.AUTO: NEGATIVE
PH UR STRIP.AUTO: 7 [PH] (ref 5–8)
PROT UR QL STRIP: NEGATIVE MG/DL
RBC UR QL AUTO: NEGATIVE
SP GR UR STRIP.AUTO: 1.01
UROBILINOGEN UR STRIP-MCNC: 0.2 MG/DL

## 2022-09-06 PROCEDURE — 81002 URINALYSIS NONAUTO W/O SCOPE: CPT | Performed by: PHYSICIAN ASSISTANT

## 2022-09-06 PROCEDURE — 99213 OFFICE O/P EST LOW 20 MIN: CPT | Performed by: PHYSICIAN ASSISTANT

## 2022-09-06 ASSESSMENT — ENCOUNTER SYMPTOMS
SWEATS: 0
FLANK PAIN: 0
NAUSEA: 0
VOMITING: 0
CHILLS: 0

## 2022-09-06 ASSESSMENT — FIBROSIS 4 INDEX: FIB4 SCORE: 1.51

## 2022-09-06 NOTE — PROGRESS NOTES
Subjective:   Susan Menendez is a 87 y.o. female who presents for Dysuria (Hx of IC  x 4 days )        Dysuria   This is a new problem. Episode onset: 4 days. The problem has been unchanged. The quality of the pain is described as burning. There has been no fever. Associated symptoms include urgency. Pertinent negatives include no chills, discharge, flank pain, frequency, hematuria, hesitancy, nausea, possible pregnancy, sweats or vomiting. Treatments tried: Stopped drinking coffee, stopped eating acidic fruit. Her past medical history is significant for a urological procedure. History of interstitial cystitis.   Review of Systems   Constitutional:  Negative for chills.   Gastrointestinal:  Negative for nausea and vomiting.   Genitourinary:  Positive for dysuria and urgency. Negative for flank pain, frequency, hematuria and hesitancy.     PMH:  has a past medical history of Anemia, Anesthesia, Arthritis (02/06/2018), Autoimmune hepatitis (HCC), Chronic diarrhea, Chronic UTI, COPD (chronic obstructive pulmonary disease) (HCC), Depression, Hiatus hernia syndrome, History of cataract surgery, Hypothyroid, Interstitial cystitis, Other specified disorder of intestines, Pain, Pneumonia, Urinary bladder disorder, and Varicose veins.  MEDS:   Current Outpatient Medications:     alendronate (FOSAMAX) 70 MG Tab, TAKE 1 TABLET BY MOUTH ONCE A WEEK ON WEDNESDAY, Disp: 12 Tablet, Rfl: 0    losartan (COZAAR) 25 MG Tab, Take 25 mg by mouth every day., Disp: , Rfl:     LEVOTHYROXINE SODIUM PO, Take  by mouth., Disp: , Rfl:     sulindac (CLINORIL) 150 MG Tab, 1 tab po bid prn joint pain, take with food, Disp: 60 Tablet, Rfl: 0    tamsulosin (FLOMAX) 0.4 MG capsule, Take 0.4 mg by mouth 1/2 hour after breakfast., Disp: , Rfl:     Testosterone 20 % Cream, by Does not apply route., Disp: , Rfl:     estradiol (ESTRACE) 0.1 MG/GM vaginal cream, Insert 1 g in vagina every evening., Disp: , Rfl:     Cholecalciferol (VITAMIN D) 2000  units Cap, Take 2,000 Units by mouth every day., Disp: , Rfl:     acetaminophen (TYLENOL) 500 MG Tab, Take 1,500 mg by mouth every 6 hours as needed for Moderate Pain., Disp: , Rfl:     gabapentin (NEURONTIN) 300 MG Cap, Take 1 po qhs, Disp: 7 Capsule, Rfl: 0    hydrOXYzine pamoate (VISTARIL) 50 MG Cap, Take 1 Capsule by mouth 3 times a day as needed for Itching (nausea)., Disp: 20 Capsule, Rfl: 1    cyclosporin (RESTASIS) 0.05 % ophthalmic emulsion, Administer 1 Drop into both eyes 2 times a day., Disp: , Rfl:     trazodone (DESYREL) 100 MG TABS, Take 50 mg by mouth every evening., Disp: , Rfl:   ALLERGIES:   Allergies   Allergen Reactions    Ceftin [Cefuroxime Axetil] Shortness of Breath     weaness     Amoxicillin      Unknown reaction    Augmentin Nausea     Stomach ache      Gluten Meal Unspecified     Weak and tongue breaks out     Keflex      rash    Morphine Nausea     extreme    Nitrofurantoin Nausea    Pcn [Penicillins] Shortness of Breath    Sulfa Drugs      Unknown rxn     SURGHX:   Past Surgical History:   Procedure Laterality Date    PB RECONSTRUC TOE DEFORM,SOFT TISSUE Left 3/16/2022    Procedure: LEFT FOOT LESSER TOES FLEXOR TENDON RELEASE, REPAIRS AS INDICATED;  Surgeon: Frandy Carmen M.D.;  Location: [facility];  Service: Orthopedics    ID TENOTOMY PERC TOE SINGLE TENDON Right 3/16/2022    Procedure: RIGHT FOOT THIRD FLEXOR TENDON RELEASE;  Surgeon: Frandy Carmen M.D.;  Location: [facility];  Service: Orthopedics    PB OSTEOTOMY METATARSAL (NOT 1ST) Right 3/16/2022    Procedure: RIGHT BUNIONETTE CORRECTION, REPAIRS AS INDICATED;  Surgeon: Frandy Carmen M.D.;  Location: [facility];  Service: Orthopedics    FUSION, SPINE, LUMBAR, PLIF N/A 12/3/2018    Procedure: L4-5 TLIF;  Surgeon: Giancarlo Berman M.D.;  Location: SURGERY [facility];  Service: Neurosurgery    DOREEN BY LAPAROSCOPY  2/12/2018    Procedure: DOREEN BY  "LAPAROSCOPY;  Surgeon: John H Ganser, M.D.;  Location: SURGERY [facility];  Service: General    BLADDER BIOPSY WITH CYSTOSCOPY  6/12/2012    Performed by JARAD GARCIA at SURGERY [facility]S    OTHER      vein stripping both legs    OTHER      \"breast lumps removed\"    OTHER ORTHOPEDIC SURGERY      right knee scope    OTHER ORTHOPEDIC SURGERY      aileen foot surgery     SOCHX:  reports that she quit smoking about 62 years ago. Her smoking use included cigarettes. She has a 1.00 pack-year smoking history. She has never used smokeless tobacco. She reports current alcohol use. She reports that she does not use drugs.  FH: Family history was reviewed, no pertinent findings to report   Objective:   /60   Pulse 70   Temp 36 °C (96.8 °F) (Temporal)   Resp 14   Ht 1.702 m (5' 7\")   Wt 65.1 kg (143 lb 9.6 oz)   SpO2 99%   BMI 22.49 kg/m²   Physical Exam  Vitals reviewed.   Constitutional:       General: She is not in acute distress.     Appearance: Normal appearance. She is well-developed. She is not toxic-appearing.   HENT:      Head: Normocephalic and atraumatic.      Right Ear: External ear normal.      Left Ear: External ear normal.      Nose: Nose normal.   Cardiovascular:      Rate and Rhythm: Normal rate and regular rhythm.   Pulmonary:      Effort: Pulmonary effort is normal. No respiratory distress.      Breath sounds: No stridor.   Skin:     General: Skin is dry.   Neurological:      Comments: Alert and oriented.    Psychiatric:         Speech: Speech normal.         Behavior: Behavior normal.         Assessment/Plan:   1. Dysuria  - POCT Urinalysis    2. Interstitial cystitis    UA within normal limits.  Unlikely that symptoms are secondary to a urinary tract infection.  Patient declines urine culture.  Recommend that she continue to avoid triggers and may also take Tylenol as needed for discomfort.  If symptoms persist or worsen recommend immediate reevaluation.    Differential " diagnosis, natural history, supportive care, and indications for immediate follow-up discussed.

## 2022-10-12 ENCOUNTER — HOSPITAL ENCOUNTER (OUTPATIENT)
Dept: LAB | Facility: MEDICAL CENTER | Age: 87
End: 2022-10-12
Attending: NURSE PRACTITIONER
Payer: MEDICARE

## 2022-10-12 ENCOUNTER — APPOINTMENT (OUTPATIENT)
Dept: RADIOLOGY | Facility: MEDICAL CENTER | Age: 87
End: 2022-10-12
Attending: INTERNAL MEDICINE
Payer: MEDICARE

## 2022-10-25 ENCOUNTER — HOSPITAL ENCOUNTER (OUTPATIENT)
Dept: LAB | Facility: MEDICAL CENTER | Age: 87
End: 2022-10-25
Attending: INTERNAL MEDICINE
Payer: MEDICARE

## 2022-10-25 DIAGNOSIS — M81.0 SENILE OSTEOPOROSIS: ICD-10-CM

## 2022-10-25 DIAGNOSIS — Z79.1 NSAID LONG-TERM USE: ICD-10-CM

## 2022-10-25 DIAGNOSIS — M15.9 PRIMARY OSTEOARTHRITIS INVOLVING MULTIPLE JOINTS: ICD-10-CM

## 2022-10-25 LAB
ALBUMIN SERPL BCP-MCNC: 4.1 G/DL (ref 3.2–4.9)
ALBUMIN/GLOB SERPL: 1.6 G/DL
ALP SERPL-CCNC: 74 U/L (ref 30–99)
ALT SERPL-CCNC: 15 U/L (ref 2–50)
ANION GAP SERPL CALC-SCNC: 12 MMOL/L (ref 7–16)
AST SERPL-CCNC: 22 U/L (ref 12–45)
BASOPHILS # BLD AUTO: 1.3 % (ref 0–1.8)
BASOPHILS # BLD: 0.06 K/UL (ref 0–0.12)
BILIRUB SERPL-MCNC: 0.2 MG/DL (ref 0.1–1.5)
BUN SERPL-MCNC: 16 MG/DL (ref 8–22)
CALCIUM SERPL-MCNC: 9.3 MG/DL (ref 8.5–10.5)
CHLORIDE SERPL-SCNC: 104 MMOL/L (ref 96–112)
CO2 SERPL-SCNC: 21 MMOL/L (ref 20–33)
CREAT SERPL-MCNC: 0.8 MG/DL (ref 0.5–1.4)
CRP SERPL HS-MCNC: <0.3 MG/DL (ref 0–0.75)
EOSINOPHIL # BLD AUTO: 0.1 K/UL (ref 0–0.51)
EOSINOPHIL NFR BLD: 2.2 % (ref 0–6.9)
ERYTHROCYTE [DISTWIDTH] IN BLOOD BY AUTOMATED COUNT: 46.4 FL (ref 35.9–50)
ERYTHROCYTE [SEDIMENTATION RATE] IN BLOOD BY WESTERGREN METHOD: 11 MM/HOUR (ref 0–25)
GFR SERPLBLD CREATININE-BSD FMLA CKD-EPI: 72 ML/MIN/1.73 M 2
GLOBULIN SER CALC-MCNC: 2.5 G/DL (ref 1.9–3.5)
GLUCOSE SERPL-MCNC: 94 MG/DL (ref 65–99)
HCT VFR BLD AUTO: 39.3 % (ref 37–47)
HGB BLD-MCNC: 13.1 G/DL (ref 12–16)
IMM GRANULOCYTES # BLD AUTO: 0.01 K/UL (ref 0–0.11)
IMM GRANULOCYTES NFR BLD AUTO: 0.2 % (ref 0–0.9)
LYMPHOCYTES # BLD AUTO: 1.38 K/UL (ref 1–4.8)
LYMPHOCYTES NFR BLD: 30.5 % (ref 22–41)
MCH RBC QN AUTO: 31.6 PG (ref 27–33)
MCHC RBC AUTO-ENTMCNC: 33.3 G/DL (ref 33.6–35)
MCV RBC AUTO: 94.7 FL (ref 81.4–97.8)
MONOCYTES # BLD AUTO: 0.4 K/UL (ref 0–0.85)
MONOCYTES NFR BLD AUTO: 8.8 % (ref 0–13.4)
NEUTROPHILS # BLD AUTO: 2.58 K/UL (ref 2–7.15)
NEUTROPHILS NFR BLD: 57 % (ref 44–72)
NRBC # BLD AUTO: 0 K/UL
NRBC BLD-RTO: 0 /100 WBC
PLATELET # BLD AUTO: 272 K/UL (ref 164–446)
PMV BLD AUTO: 10.2 FL (ref 9–12.9)
POTASSIUM SERPL-SCNC: 4.3 MMOL/L (ref 3.6–5.5)
PROT SERPL-MCNC: 6.6 G/DL (ref 6–8.2)
RBC # BLD AUTO: 4.15 M/UL (ref 4.2–5.4)
SODIUM SERPL-SCNC: 137 MMOL/L (ref 135–145)
WBC # BLD AUTO: 4.5 K/UL (ref 4.8–10.8)

## 2022-10-25 PROCEDURE — 86140 C-REACTIVE PROTEIN: CPT

## 2022-10-25 PROCEDURE — 82670 ASSAY OF TOTAL ESTRADIOL: CPT

## 2022-10-25 PROCEDURE — 85652 RBC SED RATE AUTOMATED: CPT

## 2022-10-25 PROCEDURE — 80053 COMPREHEN METABOLIC PANEL: CPT

## 2022-10-25 PROCEDURE — 85025 COMPLETE CBC W/AUTO DIFF WBC: CPT

## 2022-10-25 PROCEDURE — 82306 VITAMIN D 25 HYDROXY: CPT

## 2022-10-25 PROCEDURE — 84270 ASSAY OF SEX HORMONE GLOBUL: CPT

## 2022-10-25 PROCEDURE — 36415 COLL VENOUS BLD VENIPUNCTURE: CPT

## 2022-10-25 PROCEDURE — 84403 ASSAY OF TOTAL TESTOSTERONE: CPT

## 2022-10-25 PROCEDURE — 84402 ASSAY OF FREE TESTOSTERONE: CPT

## 2022-10-26 LAB
25(OH)D3 SERPL-MCNC: 45 NG/ML (ref 30–100)
ESTRADIOL SERPL-MCNC: 405 PG/ML

## 2022-10-31 LAB
SHBG SERPL-SCNC: 97 NMOL/L (ref 17–125)
TESTOST FREE SERPL-MCNC: 0.8 PG/ML (ref 0.6–3.8)
TESTOST SERPL-MCNC: 10 NG/DL (ref 5–32)

## 2022-11-03 ENCOUNTER — PATIENT MESSAGE (OUTPATIENT)
Dept: HEALTH INFORMATION MANAGEMENT | Facility: OTHER | Age: 87
End: 2022-11-03

## 2022-11-11 ENCOUNTER — OFFICE VISIT (OUTPATIENT)
Dept: NEUROLOGY | Facility: MEDICAL CENTER | Age: 87
End: 2022-11-11
Attending: PSYCHIATRY & NEUROLOGY
Payer: MEDICARE

## 2022-11-11 VITALS
OXYGEN SATURATION: 90 % | BODY MASS INDEX: 22.18 KG/M2 | WEIGHT: 141.31 LBS | HEART RATE: 70 BPM | TEMPERATURE: 98.5 F | HEIGHT: 67 IN | RESPIRATION RATE: 14 BRPM | DIASTOLIC BLOOD PRESSURE: 69 MMHG | SYSTOLIC BLOOD PRESSURE: 140 MMHG

## 2022-11-11 DIAGNOSIS — I10 PRIMARY HYPERTENSION: ICD-10-CM

## 2022-11-11 DIAGNOSIS — G31.84 MILD COGNITIVE IMPAIRMENT: Primary | ICD-10-CM

## 2022-11-11 DIAGNOSIS — G96.89 OTHER SPECIFIED DISORDERS OF CENTRAL NERVOUS SYSTEM: ICD-10-CM

## 2022-11-11 PROCEDURE — 99204 OFFICE O/P NEW MOD 45 MIN: CPT | Performed by: PSYCHIATRY & NEUROLOGY

## 2022-11-11 PROCEDURE — 99212 OFFICE O/P EST SF 10 MIN: CPT | Performed by: PSYCHIATRY & NEUROLOGY

## 2022-11-11 RX ORDER — ALBUTEROL SULFATE 90 UG/1
AEROSOL, METERED RESPIRATORY (INHALATION)
COMMUNITY
End: 2023-05-12

## 2022-11-11 RX ORDER — LEVOTHYROXINE SODIUM 0.15 MG/1
TABLET ORAL
COMMUNITY
Start: 2022-11-09 | End: 2022-11-11

## 2022-11-11 RX ORDER — CYCLOSPORINE 0.5 MG/ML
EMULSION OPHTHALMIC
COMMUNITY
End: 2023-05-12

## 2022-11-11 RX ORDER — OMEGA-3/DHA/EPA/FISH OIL 300-1000MG
CAPSULE ORAL
COMMUNITY
End: 2023-05-12

## 2022-11-11 RX ORDER — NITROFURANTOIN 25; 75 MG/1; MG/1
CAPSULE ORAL
COMMUNITY
End: 2023-05-12

## 2022-11-11 RX ORDER — THYROID 60 MG/1
TABLET ORAL
COMMUNITY
End: 2022-11-11

## 2022-11-11 ASSESSMENT — FIBROSIS 4 INDEX: FIB4 SCORE: 1.775117367011732739

## 2022-11-11 ASSESSMENT — PATIENT HEALTH QUESTIONNAIRE - PHQ9: CLINICAL INTERPRETATION OF PHQ2 SCORE: 0

## 2022-11-11 NOTE — PROGRESS NOTES
Reason for Neurology Consult:  memory disturbance(s) for over 1 year     History of present illness:     Susan Menendez 87 y.o. right handed woman who here alone.  Her  has Parkinson's Disease.  She was  at [employer] when getting a Master's in English Literature.  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      She has concern about whether she has memory disturbance despite any notable family history of these type of issues.     Historically she developed PTSD as of 22 years ago related to her son's health and witnessing he almost  at that time.     She has recognized that for example he she has some difficulty recalling name of an actor and actresses over 1 to 2 years. Sometimes she can't come up with the name of the person but if someone gave her a hint for the name she would easily able to recall it. This has been a consistent issue for her.     She has difficulty identifying once getting out of a store (such as [employer] or [employer] )- such as where she needs to go.  She has not gotten lost when driving home or 2 these location(s). She most recently went to [facility] a few months ago and drove back and forth in one without difficulty.     She has not noticed problems reading or remembering information that she has read in the last 6-12 months.     No history of concussion(s), stroke or seizure events known or documented in the Problem List.     She adamantly denies any problems with gait-balance or recent falls.     No history of significant alcohol or tobacco use in adult life.          Jessie denies paranoia,delusions,visual or auditory hallucinations in the recent motnhs.     Average Sleep- 8 hours most night ; uses melatonin and small amount of trazadone> awakens 2 times to go to the bathroom most nights but gets back to sleep easily.  Often awakens around 6:15 am.    No REM Sleep Behavioral Disorders.     No family history of notable memory,cognitive or dementia type issues  known.  Father:  at age 86.  Mother:  at age 94          Patient Active Problem List     Diagnosis Date Noted    Foot pain, left 2022    Pain of left foot 2022    Urinary tract infectious disease 2021    Slowing of urinary stream 2021    Depressive disorder 2021    Dysuria 2021    Recurrent urinary tract infection 03/10/2021    Compression fracture of thoracic vertebra (HCC) 2020    Chronic interstitial cystitis 2018    Low back pain 2018    Degenerative spondylolisthesis 2018    Increased frequency of urination 2018    History of recurrent urinary tract infection 2018    Fibromyalgia 2017    Diarrhea 2014    Arthritis 2014    Hearing loss 2014    Varicose veins 2014    Hypothyroidism 2014    Osteopenia 2014    Chondromalacia 2014         Past medical history:   Past Medical History        Past Medical History:   Diagnosis Date    Anemia      Anesthesia       nausea    Arthritis 2018     osteo in spine, hands, neck    Autoimmune hepatitis (HCC)      Chronic diarrhea      Chronic UTI      COPD (chronic obstructive pulmonary disease) (HCC)      Depression      Hiatus hernia syndrome      History of cataract surgery       left    Hypothyroid      Interstitial cystitis      Other specified disorder of intestines      Pain      Pneumonia       30 years ago    Urinary bladder disorder      Varicose veins              Past surgical history:   Past Surgical History         Past Surgical History:   Procedure Laterality Date    PB RECONSTRUC TOE DEFORM,SOFT TISSUE Left 3/16/2022     Procedure: LEFT FOOT LESSER TOES FLEXOR TENDON RELEASE, REPAIRS AS INDICATED;  Surgeon: Frandy Carmen M.D.;  Location: [facility];  Service: Orthopedics    MI TENOTOMY PERC TOE SINGLE TENDON Right 3/16/2022     Procedure: RIGHT FOOT THIRD FLEXOR TENDON RELEASE;  Surgeon: Frandy Carmen M.D.;  " Location: [facility];  Service: Orthopedics    PB OSTEOTOMY METATARSAL (NOT 1ST) Right 3/16/2022     Procedure: RIGHT BUNIONETTE CORRECTION, REPAIRS AS INDICATED;  Surgeon: Frandy Carmen M.D.;  Location: [facility];  Service: Orthopedics    FUSION, SPINE, LUMBAR, PLIF N/A 12/3/2018     Procedure: L4-5 TLIF;  Surgeon: Giancarlo Berman M.D.;  Location: [facility];  Service: Neurosurgery    DOREEN BY LAPAROSCOPY   2018     Procedure: DOREEN BY LAPAROSCOPY;  Surgeon: John H Ganser, M.D.;  Location: SUR[facility]ERY [facility];  Service: General    BLADDER BIOPSY WITH CYSTOSCOPY   2012     Performed by JARAD GARCIA at SURGERY [facility]    OTHER         vein stripping both legs    OTHER         \"breast lumps removed\"    OTHER ORTHOPEDIC SURGERY         right knee scope    OTHER ORTHOPEDIC SURGERY         aileen foot surgery               Social history:   Social History               Socioeconomic History    Marital status:        Spouse name: Not on file    Number of children: Not on file    Years of education: Not on file    Highest education level: Not on file   Occupational History    Not on file   Tobacco Use    Smoking status: Former       Packs/day: 0.50       Years: 2.00       Pack years: 1.00       Types: Cigarettes       Quit date: 1960       Years since quittin.9    Smokeless tobacco: Never   Vaping Use    Vaping Use: Never used   Substance and Sexual Activity    Alcohol use: Yes       Comment: 3 per month    Drug use: No    Sexual activity: Not on file   Other Topics Concern    Not on file   Social History Narrative    Not on file      Social Determinants of Health      Financial Resource Strain: Not on file   Food Insecurity: Not on file   Transportation Needs: Not on file   Physical Activity: Not on file   Stress: Not on file   Social Connections: Not on file   Intimate Partner Violence: Not on file   Housing " "Stability: Not on file            Family history:   Family History   History reviewed. No pertinent family history.           Current medications:       Current Outpatient Medications   Medication    alendronate (FOSAMAX) 70 MG Tab    losartan (COZAAR) 25 MG Tab    LEVOTHYROXINE SODIUM PO    sulindac (CLINORIL) 150 MG Tab    tamsulosin (FLOMAX) 0.4 MG capsule    Cholecalciferol (VITAMIN D) 2000 units Cap    acetaminophen (TYLENOL) 500 MG Tab    trazodone (DESYREL) 100 MG TABS    gabapentin (NEURONTIN) 300 MG Cap    hydrOXYzine pamoate (VISTARIL) 50 MG Cap    cyclosporin (RESTASIS) 0.05 % ophthalmic emulsion    Testosterone 20 % Cream    estradiol (ESTRACE) 0.1 MG/GM vaginal cream      No current facility-administered medications for this visit.         Medication Allergy:        Allergies   Allergen Reactions    Ceftin [Cefuroxime Axetil] Shortness of Breath       weaness     Amoxicillin         Unknown reaction    Augmentin Nausea       Stomach ache       Gluten Meal Unspecified       Weak and tongue breaks out     Keflex         rash    Macrodantin [Kdc:Red Dye+Yellow Dye+Ci Pigment Blue 63+Nitrofurantoin]      Morphine Nausea       extreme    Nitrofurantoin Nausea    Pcn [Penicillins] Shortness of Breath    Sulfa Drugs         Unknown rxn               Physical examination:   Vitals       Vitals:     11/11/22 1414   BP: (!) 140/69   BP Location: Right arm   Patient Position: Sitting   BP Cuff Size: Adult   Pulse: 70   Resp: 14   Temp: 36.9 °C (98.5 °F)   TempSrc: Temporal   SpO2: 90%   Weight: 64.1 kg (141 lb 5 oz)   Height: 1.702 m (5' 7\")            Normal cephalic atraumatic.  There is full range of movement around the neck in all directions without restrictions or discrete pain evoked triggers.  No lower extremity edema.        Neurological  Exam:        Davi Cognitive Assessment (MOCA) Version 7.1     Years of Education: Master's in English     TOTAL SCORE: 29/30  (to be scanned into the MEDIA section in " the E.M.R.)        Mental status: Awake, alert and fully oriented to person, place, time, and situation. Normal attention, concentration, and fund of knowledge for education level.  Did not appear/act combative,irritable,anxious,paranoid/delusional or aggressive to or with me.     Speech and language: Speech is fluent without errors, clear, intact to repetition, and intact to naming.     Follows 3 step motor commands in sequence without significant delay and correctly.     Cranial nerve exam:  II: Pupils are equally round and reactive to light. Visual fields are intact by confrontation.  III, IV, VI: EOMI, no diplopia, no ptosis.  V: Sensation to light touch is normal over V1-3 distributions bilaterally.  .  VII: Facial movements are symmetrical. There is no facial droop. .  VIII: Hearing intact to soft speech and finger rub bilaterally  IX: Palate elevates symmetrically, uvula is midline. Dysarthria is not present.  XI: Shoulder shrug are symmetrical and strong.   XII: Tongue protrudes midline.        Motor exam:  Muscle tone is normal in all 4 limbs. and No abnormal movements appreciated.     Muscle strength:     Neck Flexors/Extensors: 5/5                                         Right               Left  Deltoid                         5/5                   5/5                                             Biceps                         5/5                   5/5  Triceps                                    5/5                   5/5         Wrist extensors           5/5                   5/5  Wrist flexors                5/5                   5/5                               5/5                   5/5  Interossei                    5/5                   5/5  Thenar (APB)              5/5                   5/5         Hip flexors                   5/5                   5/5  Quadriceps                  5/5                   5/5                     Hamstrings                  5/5                   5/5  Dorsiflexors   "               5/5                   5/5  Plantarflexors              5/5                   5/5  Toe extension             5/5                   5/5           Reflexes:                                        Right               Left  Biceps                         2/4                   2/4  Triceps                                   2/4                    2/4  Brachioradialis                        2/4                   2/4  Knee jerk                     2/4                   2/4  Ankle jerk                    2/4                   2/4            Frontal release signs are absent     on left toes are downgoing to plantar stimulation..     Coordination (finger-to-nose, heel/knee/shin, rapid alternating movements) was normal.      There was no ataxia, no tremors, and no dysmetria.      Station and gait were normal. Easily stands up from exam chair without retropulsion,veering,leaning,swaying (to either side).         Labs and Tests:     NEUROIMAGING:  No recent Brain imaging.     Blood Work Done and reviewed from 10/202: Vitamin D level> 45; CRP: wnl.  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     She has noticed several issues with word retrieval and some low level confusion when leaving large \"box\" stores.     She is aware that she has more difficulty with word retrieval.     Despite her MOCA score being 29/30 today, she did have some difficulty spelling alligator out loud (she spelled this one \"L\").     2. HTN- know and on medication (borderline elevated today)     Today, I reviewed the clinical criteria and reviewed several  scenarios of the differences being using the medical terms of normal brain aging (age associated memory impairment),  Mild Cognitive Impairment (MCI) and Dementia.    MCI is a syndrome but statistically and for the majority of patients  occurs due  to a more rapid aging of the brain tissue or potentially from " "injury to certain parts of one's brain ( often from such contributing factors as  the cumulative effects of alcohol, from one or more ischemic or hemmorhagic stroke(s), from neurodegeneration or long standing with/without suboptimally controlled Hypertension). It is for some of these potential factors as to why I recommend a brain imaging test (Head CT or Brain MRI) be done for the 1st time or in certain circumstances repeated for comparison purposes  as such imaging can suggest one or more factors as to the reason(s) for the person's cognitive/memory changes. In fact, a normal or \"age related\" finding on a brain imaging test in and of itself is useful clinical and objective information to have in the evaluation of a person who has either an acute, evolving or even chronic (months to years) long cognitive/memory complaint.     Additional factors or contributors to Brain Health issues can be summarized in several books/references which I discussed as well today.      Goals going forwards include:     A. Paying attention to one's risk factors and reducing their prevalence or potential impact on one's changing memory/thinking> an excellent example would be to stop smoking, reduce or eliminate alcohol use (depending on the amount and frequency of usage), maintain good blood pressure control by buying a digital arm blood pressure cuff such as an OMRON Series 3 or 5 and checking one's blood pressure atleast 3 days per week (in the am and early afternoon) that the numbers are under 140/90 and working as needed with the primary care doctor  to optimize blood pressure control).        B. Encouraging proper sleep hygiene which for most adults is 7 to 8 hours of uninterrupted sleep per night.       C. Encouraging some form of exercise preferable aerobic forms to be done (4 to 5 days per week- 30 minutes minimum per day)> 150 minutes per week as a goal. Example activities could include fast walking (up a slight incline), " jogging, cycling (road or stationary), swimming,tennis. Essentially, even basic walking on a flat surface or a treadmill would be better than doing nothing.     D. Maintaining or forming new social contacts with family and friends  and a positive attitude despite the concerns and/or ongoing issues with thinking and/or memory.     E. Eating well which means a diet low in salt  (under 2 grams per day), sugar and saturated fat.     F. Maintaining one's BMI (Body Mass Index) under 30.     I provided a handout to the patient about Mild Cognitive Impairment as it relates to the above topics.     G. Blood Pressure control very important > goal under 140/90 long term.     H. Brain MRI to be done  to access structure of brain tissue and formal Neuropsychological testing to be ordered     I. Vitamin B12, B1 and B9-  to be ordered.     J. I suggested some books about Brain Health and gave Jessie a list today of them.              I have performed  a history and physical exam and a directed /focused  ROS today.     Total time spent today or this patient's care was 55  minutes  and included reviewing  the diagnostic workup to date (such as labs and imaging as well as interpreting such tests relevant to this patient's neurological condition),  reviewing/obtaining separately obtained history (from patient and/or accompanying ffamily member)  for today's neurological problem(s) ,counseling and educating the patient and family member on issues related to cognition/memory and cognitive health factors and documenting  the clinical information in the EMR.     Follow up in 6 months or so            Ernesto Isbell MD  [place] [facility].   [facility]

## 2022-11-11 NOTE — PROGRESS NOTES
Reason for Neurology Consult:  memory disturbance(s) for over 1 year    History of present illness:    Susan Menendez 87 y.o. right handed woman who here alone.  Her  has Parkinson's Disease.  She was  at [employer] when getting a Master's in English Literature.  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     She has concern about whether she has memory disturbance despite any notable family history of these type of issues.    Historically she developed PTSD as of 22 years ago related to her son's health and witnessing he almost  at that time.    She has recognized that for example he she has some difficulty recalling name of an actor and actresses over 1 to 2 years. Sometimes she can't come up with the name of the person but if someone gave her a hint for the name she would easily able to recall it. This has been a consistent issue for her.    She has difficulty identifying once getting out of a store (such as [employer] or [employer] )- such as where she needs to go.  She has not gotten lost when driving home or 2 these location(s). She most recently went to [facility] a few months ago and drove back and forth in one without difficulty.    She has not noticed problems reading or remembering information that she has read in the last 6-12 months.    No history of concussion(s), stroke or seizure events known or documented in the Problem List.    She adamantly denies any problems with gait-balance or recent falls.    No history of significant alcohol or tobacco use in adult life.        Jessie denies paranoia,delusions,visual or auditory hallucinations in the recent motnhs.    Average Sleep- 8 hours most night ; uses melatonin and small amount of trazadone> awakens 2 times to go to the bathroom most nights but gets back to sleep easily.  Often awakens around 6:15 am.    No REM Sleep Behavioral Disorders.    No family history of notable memory,cognitive or dementia type issues  known.  Father:  at age 86.  Mother:  at age 94    Patient Active Problem List    Diagnosis Date Noted    Foot pain, left 2022    Pain of left foot 2022    Urinary tract infectious disease 2021    Slowing of urinary stream 2021    Depressive disorder 2021    Dysuria 2021    Recurrent urinary tract infection 03/10/2021    Compression fracture of thoracic vertebra (HCC) 2020    Chronic interstitial cystitis 2018    Low back pain 2018    Degenerative spondylolisthesis 2018    Increased frequency of urination 2018    History of recurrent urinary tract infection 2018    Fibromyalgia 2017    Diarrhea 2014    Arthritis 2014    Hearing loss 2014    Varicose veins 2014    Hypothyroidism 2014    Osteopenia 2014    Chondromalacia 2014       Past medical history:   Past Medical History:   Diagnosis Date    Anemia     Anesthesia     nausea    Arthritis 2018    osteo in spine, hands, neck    Autoimmune hepatitis (HCC)     Chronic diarrhea     Chronic UTI     COPD (chronic obstructive pulmonary disease) ([facility])     Depression     Hiatus hernia syndrome     History of cataract surgery     left    Hypothyroid     Interstitial cystitis     Other specified disorder of intestines     Pain     Pneumonia     30 years ago    Urinary bladder disorder     Varicose veins        Past surgical history:   Past Surgical History:   Procedure Laterality Date    PB RECONSTRUC TOE DEFORM,SOFT TISSUE Left 3/16/2022    Procedure: LEFT FOOT LESSER TOES FLEXOR TENDON RELEASE, REPAIRS AS INDICATED;  Surgeon: Frandy Carmen M.D.;  Location: [facility];  Service: Orthopedics    CO TENOTOMY PERC TOE SINGLE TENDON Right 3/16/2022    Procedure: RIGHT FOOT THIRD FLEXOR TENDON RELEASE;  Surgeon: Frandy Carmen M.D.;  Location: [facility];  Service: Orthopedics    PB OSTEOTOMY METATARSAL  "(NOT 1ST) Right 3/16/2022    Procedure: RIGHT BUNIONETTE CORRECTION, REPAIRS AS INDICATED;  Surgeon: Frandy Carmen M.D.;  Location: [facility];  Service: Orthopedics    FUSION, SPINE, LUMBAR, PLIF N/A 12/3/2018    Procedure: L4-5 TLIF;  Surgeon: Giancarlo Berman M.D.;  Location: [facility];  Service: Neurosurgery    DOREEN BY LAPAROSCOPY  2018    Procedure: DOREEN BY LAPAROSCOPY;  Surgeon: John H Ganser, M.D.;  Location: SURGERY [facility];  Service: General    BLADDER BIOPSY WITH CYSTOSCOPY  2012    Performed by JARAD GARCIA at SURGERY [facility]    OTHER      vein stripping both legs    OTHER      \"breast lumps removed\"    OTHER ORTHOPEDIC SURGERY      right knee scope    OTHER ORTHOPEDIC SURGERY      aileen foot surgery         Social history:   Social History     Socioeconomic History    Marital status:      Spouse name: Not on file    Number of children: Not on file    Years of education: Not on file    Highest education level: Not on file   Occupational History    Not on file   Tobacco Use    Smoking status: Former     Packs/day: 0.50     Years: 2.00     Pack years: 1.00     Types: Cigarettes     Quit date: 1960     Years since quittin.9    Smokeless tobacco: Never   Vaping Use    Vaping Use: Never used   Substance and Sexual Activity    Alcohol use: Yes     Comment: 3 per month    Drug use: No    Sexual activity: Not on file   Other Topics Concern    Not on file   Social History Narrative    Not on file     Social Determinants of Health     Financial Resource Strain: Not on file   Food Insecurity: Not on file   Transportation Needs: Not on file   Physical Activity: Not on file   Stress: Not on file   Social Connections: Not on file   Intimate Partner Violence: Not on file   Housing Stability: Not on file       Family history:   History reviewed. No pertinent family history.      Current medications:   Current Outpatient " "Medications   Medication    alendronate (FOSAMAX) 70 MG Tab    losartan (COZAAR) 25 MG Tab    LEVOTHYROXINE SODIUM PO    sulindac (CLINORIL) 150 MG Tab    tamsulosin (FLOMAX) 0.4 MG capsule    Cholecalciferol (VITAMIN D) 2000 units Cap    acetaminophen (TYLENOL) 500 MG Tab    trazodone (DESYREL) 100 MG TABS    gabapentin (NEURONTIN) 300 MG Cap    hydrOXYzine pamoate (VISTARIL) 50 MG Cap    cyclosporin (RESTASIS) 0.05 % ophthalmic emulsion    Testosterone 20 % Cream    estradiol (ESTRACE) 0.1 MG/GM vaginal cream     No current facility-administered medications for this visit.       Medication Allergy:  Allergies   Allergen Reactions    Ceftin [Cefuroxime Axetil] Shortness of Breath     weaness     Amoxicillin      Unknown reaction    Augmentin Nausea     Stomach ache      Gluten Meal Unspecified     Weak and tongue breaks out     Keflex      rash    Macrodantin [Kdc:Red Dye+Yellow Dye+Ci Pigment Blue 63+Nitrofurantoin]     Morphine Nausea     extreme    Nitrofurantoin Nausea    Pcn [Penicillins] Shortness of Breath    Sulfa Drugs      Unknown rxn           Physical examination:   Vitals:    11/11/22 1414   BP: (!) 140/69   BP Location: Right arm   Patient Position: Sitting   BP Cuff Size: Adult   Pulse: 70   Resp: 14   Temp: 36.9 °C (98.5 °F)   TempSrc: Temporal   SpO2: 90%   Weight: 64.1 kg (141 lb 5 oz)   Height: 1.702 m (5' 7\")       Normal cephalic atraumatic.  There is full range of movement around the neck in all directions without restrictions or discrete pain evoked triggers.  No lower extremity edema.      Neurological  Exam:      Davi Cognitive Assessment (MOCA) Version 7.1    Years of Education: Master's in English    TOTAL SCORE: 29/30  (to be scanned into the MEDIA section in the E.M.R.)      Mental status: Awake, alert and fully oriented to person, place, time, and situation. Normal attention, concentration, and fund of knowledge for education level.  Did not appear/act " combative,irritable,anxious,paranoid/delusional or aggressive to or with me.    Speech and language: Speech is fluent without errors, clear, intact to repetition, and intact to naming.     Follows 3 step motor commands in sequence without significant delay and correctly.    Cranial nerve exam:  II: Pupils are equally round and reactive to light. Visual fields are intact by confrontation.  III, IV, VI: EOMI, no diplopia, no ptosis.  V: Sensation to light touch is normal over V1-3 distributions bilaterally.  .  VII: Facial movements are symmetrical. There is no facial droop. .  VIII: Hearing intact to soft speech and finger rub bilaterally  IX: Palate elevates symmetrically, uvula is midline. Dysarthria is not present.  XI: Shoulder shrug are symmetrical and strong.   XII: Tongue protrudes midline.      Motor exam:  Muscle tone is normal in all 4 limbs. and No abnormal movements appreciated.    Muscle strength:    Neck Flexors/Extensors: 5/5       Right  Left  Deltoid   5/5  5/5      Biceps   5/5  5/5  Triceps              5/5  5/5   Wrist extensors 5/5  5/5  Wrist flexors  5/5  5/5     5/5  5/5  Interossei  5/5  5/5  Thenar (APB)  5/5  5/5   Hip flexors  5/5  5/5  Quadriceps  5/5  5/5    Hamstrings  5/5  5/5  Dorsiflexors  5/5  5/5  Plantarflexors  5/5  5/5  Toe extension  5/5  5/5        Reflexes:       Right  Left  Biceps   2/4  2/4  Triceps             2/4  2/4  Brachioradialis             2/4  2/4  Knee jerk  2/4  2/4  Ankle jerk  2/4  2/4     Frontal release signs are absent    on left toes are downgoing to plantar stimulation..    Coordination (finger-to-nose, heel/knee/shin, rapid alternating movements) was normal.     There was no ataxia, no tremors, and no dysmetria.     Station and gait were normal. Easily stands up from exam chair without retropulsion,veering,leaning,swaying (to either side).       Labs and Tests:     NEUROIMAGING:  No recent Brain imaging.    Blood Work Done and reviewed from 10/202:  "Vitamin D level> 45; CRP: wnl.  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    She has noticed several issues with word retrieval and some low level confusion when leaving large \"box\" stores.    She is aware that she has more difficulty with word retrieval.    Despite her MOCA score being 29/30 today, she did have some difficulty spelling alligator out loud (she spelled this one \"L\").    2. HTN- know and on medication (borderline elevated today)    Today, I reviewed the clinical criteria and reviewed several  scenarios of the differences being using the medical terms of normal brain aging (age associated memory impairment),  Mild Cognitive Impairment (MCI) and Dementia.    MCI is a syndrome but statistically and for the majority of patients  occurs due  to a more rapid aging of the brain tissue or potentially from injury to certain parts of one's brain ( often from such contributing factors as  the cumulative effects of alcohol, from one or more ischemic or hemmorhagic stroke(s), from neurodegeneration or long standing with/without suboptimally controlled Hypertension). It is for some of these potential factors as to why I recommend a brain imaging test (Head CT or Brain MRI) be done for the 1st time or in certain circumstances repeated for comparison purposes  as such imaging can suggest one or more factors as to the reason(s) for the person's cognitive/memory changes. In fact, a normal or \"age related\" finding on a brain imaging test in and of itself is useful clinical and objective information to have in the evaluation of a person who has either an acute, evolving or even chronic (months to years) long cognitive/memory complaint.    Additional factors or contributors to Brain Health issues can be summarized in several books/references which I discussed as well today.     Goals going forwards include:    A. Paying attention to one's risk " factors and reducing their prevalence or potential impact on one's changing memory/thinking> an excellent example would be to stop smoking, reduce or eliminate alcohol use (depending on the amount and frequency of usage), maintain good blood pressure control by buying a digital arm blood pressure cuff such as an OMRON Series 3 or 5 and checking one's blood pressure atleast 3 days per week (in the am and early afternoon) that the numbers are under 140/90 and working as needed with the primary care doctor  to optimize blood pressure control).      B. Encouraging proper sleep hygiene which for most adults is 7 to 8 hours of uninterrupted sleep per night.      C. Encouraging some form of exercise preferable aerobic forms to be done (4 to 5 days per week- 30 minutes minimum per day)> 150 minutes per week as a goal. Example activities could include fast walking (up a slight incline), jogging, cycling (road or stationary), swimming,tennis. Essentially, even basic walking on a flat surface or a treadmill would be better than doing nothing.    D. Maintaining or forming new social contacts with family and friends  and a positive attitude despite the concerns and/or ongoing issues with thinking and/or memory.    E. Eating well which means a diet low in salt  (under 2 grams per day), sugar and saturated fat.    F. Maintaining one's BMI (Body Mass Index) under 30.    I provided a handout to the patient about Mild Cognitive Impairment as it relates to the above topics.    G. Blood Pressure control very important > goal under 140/90 long term.    H. Brain MRI to be done  to access structure of brain tissue and formal Neuropsychological testing to be ordered    I. Vitamin B12, B1 and B9-  to be ordered.    J. I suggested some books about Brain Health and gave Jessie a list today of them.          I have performed  a history and physical exam and a directed /focused  ROS today.    Total time spent today or this patient's care was 52  minutes  and included reviewing  the diagnostic workup to date (such as labs and imaging as well as interpreting such tests relevant to this patient's neurological condition),  reviewing/obtaining separately obtained history (from patient and/or accompanying ffamily member)  for today's neurological problem(s) ,counseling and educating the patient and family member on issues related to cognition/memory and cognitive health factors and documenting  the clinical information in the EMR.    Follow up in 6 months or so         Ernesto Isbell MD  [place] [facility].   [facility]

## 2022-11-14 ENCOUNTER — HOSPITAL ENCOUNTER (OUTPATIENT)
Dept: LAB | Facility: MEDICAL CENTER | Age: 87
End: 2022-11-14
Attending: PSYCHIATRY & NEUROLOGY
Payer: MEDICARE

## 2022-11-14 DIAGNOSIS — G31.84 MILD COGNITIVE IMPAIRMENT: ICD-10-CM

## 2022-11-14 LAB
FOLATE SERPL-MCNC: 33.6 NG/ML
SODIUM SERPL-SCNC: 133 MMOL/L (ref 135–145)
TSH SERPL DL<=0.005 MIU/L-ACNC: 4.04 UIU/ML (ref 0.38–5.33)
VIT B12 SERPL-MCNC: 1642 PG/ML (ref 211–911)

## 2022-11-14 PROCEDURE — 82746 ASSAY OF FOLIC ACID SERUM: CPT

## 2022-11-14 PROCEDURE — 84295 ASSAY OF SERUM SODIUM: CPT

## 2022-11-14 PROCEDURE — 36415 COLL VENOUS BLD VENIPUNCTURE: CPT

## 2022-11-14 PROCEDURE — 82607 VITAMIN B-12: CPT

## 2022-11-14 PROCEDURE — 84425 ASSAY OF VITAMIN B-1: CPT

## 2022-11-14 PROCEDURE — 84443 ASSAY THYROID STIM HORMONE: CPT

## 2022-11-19 ENCOUNTER — HOSPITAL ENCOUNTER (OUTPATIENT)
Dept: LAB | Facility: MEDICAL CENTER | Age: 87
End: 2022-11-19
Attending: OBSTETRICS & GYNECOLOGY
Payer: MEDICARE

## 2022-11-19 LAB
ESTRADIOL SERPL-MCNC: 540 PG/ML
VIT B1 BLD-MCNC: 342 NMOL/L (ref 70–180)

## 2022-11-19 PROCEDURE — 36415 COLL VENOUS BLD VENIPUNCTURE: CPT

## 2022-11-19 PROCEDURE — 82670 ASSAY OF TOTAL ESTRADIOL: CPT

## 2022-11-19 PROCEDURE — 86336 INHIBIN A: CPT

## 2022-11-19 PROCEDURE — 84403 ASSAY OF TOTAL TESTOSTERONE: CPT

## 2022-11-19 PROCEDURE — 84402 ASSAY OF FREE TESTOSTERONE: CPT

## 2022-11-19 PROCEDURE — 84270 ASSAY OF SEX HORMONE GLOBUL: CPT

## 2022-11-19 PROCEDURE — 83520 IMMUNOASSAY QUANT NOS NONAB: CPT

## 2022-11-23 LAB — TEST NAME 95000: NORMAL

## 2022-11-24 LAB
SHBG SERPL-SCNC: 100 NMOL/L (ref 17–125)
TESTOST FREE SERPL-MCNC: 0.9 PG/ML (ref 0.6–3.8)
TESTOST SERPL-MCNC: 12 NG/DL (ref 5–32)

## 2022-11-25 LAB — MISCELLANEOUS LAB RESULT MISCLAB: NORMAL

## 2022-11-29 ENCOUNTER — HOSPITAL ENCOUNTER (OUTPATIENT)
Dept: RADIOLOGY | Facility: MEDICAL CENTER | Age: 87
End: 2022-11-29
Attending: PSYCHIATRY & NEUROLOGY
Payer: MEDICARE

## 2022-11-29 ENCOUNTER — HOSPITAL ENCOUNTER (OUTPATIENT)
Dept: RADIOLOGY | Facility: MEDICAL CENTER | Age: 87
End: 2022-11-29
Attending: INTERNAL MEDICINE
Payer: MEDICARE

## 2022-11-29 DIAGNOSIS — G31.84 MILD COGNITIVE IMPAIRMENT: ICD-10-CM

## 2022-11-29 DIAGNOSIS — G96.89 OTHER SPECIFIED DISORDERS OF CENTRAL NERVOUS SYSTEM: ICD-10-CM

## 2022-11-29 DIAGNOSIS — M15.9 PRIMARY OSTEOARTHRITIS INVOLVING MULTIPLE JOINTS: ICD-10-CM

## 2022-11-29 DIAGNOSIS — Z79.1 NSAID LONG-TERM USE: ICD-10-CM

## 2022-11-29 DIAGNOSIS — M81.0 SENILE OSTEOPOROSIS: ICD-10-CM

## 2022-11-29 PROCEDURE — 77080 DXA BONE DENSITY AXIAL: CPT

## 2022-12-12 ENCOUNTER — HOSPITAL ENCOUNTER (OUTPATIENT)
Dept: LAB | Facility: MEDICAL CENTER | Age: 87
End: 2022-12-12
Attending: SPECIALIST
Payer: MEDICARE

## 2022-12-12 LAB — ESTRADIOL SERPL-MCNC: 298 PG/ML

## 2022-12-12 PROCEDURE — 36415 COLL VENOUS BLD VENIPUNCTURE: CPT

## 2022-12-12 PROCEDURE — 83520 IMMUNOASSAY QUANT NOS NONAB: CPT

## 2022-12-12 PROCEDURE — 82670 ASSAY OF TOTAL ESTRADIOL: CPT

## 2022-12-15 LAB — MIS SERPL-MCNC: <0.003 NG/ML

## 2022-12-19 PROBLEM — M25.571 PAIN IN RIGHT ANKLE: Status: ACTIVE | Noted: 2022-12-19

## 2022-12-19 PROBLEM — M79.671 PAIN IN RIGHT FOOT: Status: ACTIVE | Noted: 2022-12-19

## 2022-12-19 ASSESSMENT — FIBROSIS 4 INDEX: FIB4 SCORE: 1.775117367011732739

## 2022-12-20 ENCOUNTER — HOSPITAL ENCOUNTER (OUTPATIENT)
Dept: RADIOLOGY | Facility: MEDICAL CENTER | Age: 87
End: 2022-12-20
Attending: SPECIALIST
Payer: MEDICARE

## 2022-12-20 DIAGNOSIS — E34.9 ENDOCRINE DISORDER: ICD-10-CM

## 2022-12-20 DIAGNOSIS — E28.0 ESTROGEN EXCESS: ICD-10-CM

## 2022-12-20 PROCEDURE — 74177 CT ABD & PELVIS W/CONTRAST: CPT

## 2022-12-20 PROCEDURE — 700117 HCHG RX CONTRAST REV CODE 255: Performed by: SPECIALIST

## 2022-12-20 RX ADMIN — IOHEXOL 10 ML: 240 INJECTION, SOLUTION INTRATHECAL; INTRAVASCULAR; INTRAVENOUS; ORAL at 15:20

## 2022-12-20 RX ADMIN — IOHEXOL 100 ML: 350 INJECTION, SOLUTION INTRAVENOUS at 15:20

## 2023-01-05 ENCOUNTER — HOSPITAL ENCOUNTER (OUTPATIENT)
Dept: RADIOLOGY | Facility: MEDICAL CENTER | Age: 88
End: 2023-01-05
Attending: PSYCHIATRY & NEUROLOGY
Payer: MEDICARE

## 2023-01-05 PROCEDURE — 70551 MRI BRAIN STEM W/O DYE: CPT

## 2023-03-10 ENCOUNTER — HOSPITAL ENCOUNTER (OUTPATIENT)
Facility: MEDICAL CENTER | Age: 88
End: 2023-03-10
Attending: FAMILY MEDICINE
Payer: MEDICARE

## 2023-03-10 ENCOUNTER — HOSPITAL ENCOUNTER (OUTPATIENT)
Dept: LAB | Facility: MEDICAL CENTER | Age: 88
End: 2023-03-10
Attending: FAMILY MEDICINE
Payer: MEDICARE

## 2023-03-10 LAB
T3 SERPL-MCNC: 68.3 NG/DL (ref 60–181)
T4 SERPL-MCNC: 5.9 UG/DL (ref 4–12)
TSH SERPL DL<=0.005 MIU/L-ACNC: 2.29 UIU/ML (ref 0.38–5.33)

## 2023-03-10 PROCEDURE — 36415 COLL VENOUS BLD VENIPUNCTURE: CPT

## 2023-03-10 PROCEDURE — 84480 ASSAY TRIIODOTHYRONINE (T3): CPT

## 2023-03-10 PROCEDURE — 84443 ASSAY THYROID STIM HORMONE: CPT

## 2023-03-10 PROCEDURE — 84436 ASSAY OF TOTAL THYROXINE: CPT

## 2023-03-10 PROCEDURE — 87338 HPYLORI STOOL AG IA: CPT

## 2023-03-13 LAB — H PYLORI AG STL QL IA: NOT DETECTED

## 2023-03-27 ENCOUNTER — HOSPITAL ENCOUNTER (OUTPATIENT)
Dept: LAB | Facility: MEDICAL CENTER | Age: 88
End: 2023-03-27
Attending: SPECIALIST
Payer: MEDICARE

## 2023-03-27 LAB — ESTRADIOL SERPL-MCNC: 15.7 PG/ML

## 2023-03-27 PROCEDURE — 36415 COLL VENOUS BLD VENIPUNCTURE: CPT

## 2023-03-27 PROCEDURE — 82670 ASSAY OF TOTAL ESTRADIOL: CPT

## 2023-03-27 PROCEDURE — 83520 IMMUNOASSAY QUANT NOS NONAB: CPT

## 2023-03-30 LAB — MIS SERPL-MCNC: <0.003 NG/ML

## 2023-04-10 ENCOUNTER — OFFICE VISIT (OUTPATIENT)
Dept: NEUROLOGY | Facility: MEDICAL CENTER | Age: 88
End: 2023-04-10
Attending: CLINICAL NEUROPSYCHOLOGIST
Payer: MEDICARE

## 2023-04-10 DIAGNOSIS — R41.9 COGNITIVE COMPLAINTS WITH NORMAL NEUROPSYCHOLOGICAL EXAM: ICD-10-CM

## 2023-04-10 PROCEDURE — 96137 PSYCL/NRPSYC TST PHY/QHP EA: CPT | Performed by: CLINICAL NEUROPSYCHOLOGIST

## 2023-04-10 PROCEDURE — 96116 NUBHVL XM PHYS/QHP 1ST HR: CPT | Performed by: CLINICAL NEUROPSYCHOLOGIST

## 2023-04-10 PROCEDURE — 96136 PSYCL/NRPSYC TST PHY/QHP 1ST: CPT | Performed by: CLINICAL NEUROPSYCHOLOGIST

## 2023-04-10 ASSESSMENT — ANXIETY QUESTIONNAIRES
7. FEELING AFRAID AS IF SOMETHING AWFUL MIGHT HAPPEN: SEVERAL DAYS
3. WORRYING TOO MUCH ABOUT DIFFERENT THINGS: SEVERAL DAYS
6. BECOMING EASILY ANNOYED OR IRRITABLE: MORE THAN HALF THE DAYS
GAD7 TOTAL SCORE: 7
2. NOT BEING ABLE TO STOP OR CONTROL WORRYING: SEVERAL DAYS
1. FEELING NERVOUS, ANXIOUS, OR ON EDGE: SEVERAL DAYS
IF YOU CHECKED OFF ANY PROBLEMS ON THIS QUESTIONNAIRE, HOW DIFFICULT HAVE THESE PROBLEMS MADE IT FOR YOU TO DO YOUR WORK, TAKE CARE OF THINGS AT HOME, OR GET ALONG WITH OTHER PEOPLE: SOMEWHAT DIFFICULT
5. BEING SO RESTLESS THAT IT IS HARD TO SIT STILL: NOT AT ALL
4. TROUBLE RELAXING: SEVERAL DAYS

## 2023-04-10 ASSESSMENT — PATIENT HEALTH QUESTIONNAIRE - PHQ9
SUM OF ALL RESPONSES TO PHQ QUESTIONS 1-9: 9
CLINICAL INTERPRETATION OF PHQ2 SCORE: 2
5. POOR APPETITE OR OVEREATING: 2 - MORE THAN HALF THE DAYS

## 2023-04-10 NOTE — PROGRESS NOTES
Neurobehavioral Status Exam and Neuropsychological Testing    Patient name: Susan Menendez  Referral source: Ernesto Isbell M.D.   MRN: [id number]  Evaluation date: 4/10/2023   YOB: 1935  Neuropsychologist: Vern Tucker, Ph.D.         This evaluation was conducted for clinical treatment planning and may not be valid for other purposes. Potential risks and benefits, limits of confidentiality, and test procedures were discussed. Following this discussion, the patient consented to complete the evaluation. Information for this section was obtained from the medical record and a clinical interview with the patient and conducted on 04/10/2023. Information included in this section is intended as a reference and should not be interpreted in the absence of the complete neuropsychological report. Please refer to the neuropsychological report for additional information including test results, impressions, and recommendations.     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 Additional medical history is relevant for post-traumatic stress disorder (PTSD), hypothyroidism, osteoarthritis recurrent UTIs, chronic obstructive pulmonary disease (COPD), urinary bladder disorder, hearing loss (wears bilateral hearing aids daily), and fibromyalgia. Dr. Isbell referred the patient for a neuropsychological evaluation to characterize her cognitive concerns, aid in differential diagnosis, and treatment planning.    Presenting Concerns Summary: The patient reported onset of cognitive difficulties 5 years ago. She indicated these difficulties have slightly worsened over time. Specifically, she noted problems with short-term memory, word finding, processing speed, and aspects of executive functioning. She  reportedly remains independent in all basic and instrumental activities of daily living (ADLs). She reported mild symptoms of depression and anxiety, which are largely situational, during the clinical interview and on self-report measures. She also noted occasional sleep problems despite taking trazodone as a sleep aid.     Patient Active Problem List   Diagnosis    Hypothyroidism    Osteopenia    Chondromalacia    Diarrhea    Arthritis    Hearing loss    Varicose veins    Fibromyalgia    Urinary tract infectious disease    Slowing of urinary stream    Recurrent urinary tract infection    Chronic interstitial cystitis    Increased frequency of urination    History of recurrent urinary tract infection    Low back pain    Depressive disorder    Degenerative spondylolisthesis    Compression fracture of thoracic vertebra (HCC)    Dysuria    Pain of left foot    Foot pain, left    Pain in right foot    Pain in right ankle     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, hands, neck    Autoimmune hepatitis ([facility])     Chronic diarrhea     Chronic UTI     COPD (chronic obstructive pulmonary disease) ([facility])     Depression     Hiatus hernia syndrome     History of cataract surgery     left    Hypothyroid     Interstitial cystitis     Other specified disorder of intestines     Pain     Pneumonia     30 years ago    Urinary bladder disorder     Varicose veins       Past Surgical History:   Procedure Laterality Date    PB RECONSTRUC TOE DEFORM,SOFT TISSUE Left 3/16/2022    Procedure: LEFT FOOT LESSER TOES FLEXOR TENDON RELEASE, REPAIRS AS INDICATED;  Surgeon: Frandy Carmen M.D.;  Location: [facility];  Service: Orthopedics    MD TENOTOMY PERC TOE SINGLE TENDON Right 3/16/2022    Procedure: RIGHT FOOT THIRD FLEXOR TENDON RELEASE;  Surgeon: Frandy Carmen M.D.;  Location: [facility];  Service: Orthopedics    PB  "OSTEOTOMY METATARSAL (NOT 1ST) Right 3/16/2022    Procedure: RIGHT BUNIONETTE CORRECTION, REPAIRS AS INDICATED;  Surgeon: Frandy Carmen M.D.;  Location: [facility];  Service: Orthopedics    FUSION, SPINE, LUMBAR, PLIF N/A 12/3/2018    Procedure: L4-5 TLIF;  Surgeon: Giancarlo Berman M.D.;  Location: SUR[facility]ERY [facility];  Service: Neurosurgery    DOREEN BY LAPAROSCOPY  2/12/2018    Procedure: DOREEN BY LAPAROSCOPY;  Surgeon: John H Ganser, M.D.;  Location: SURGERY [facility];  Service: General    BLADDER BIOPSY WITH CYSTOSCOPY  6/12/2012    Performed by JARAD GARCIA at SURGERY [facility]    OTHER      vein stripping both legs    OTHER      \"breast lumps removed\"    OTHER ORTHOPEDIC SURGERY      right knee scope    OTHER ORTHOPEDIC SURGERY      aileen foot surgery      History reviewed. No pertinent family history.     Current Outpatient Medications:     Coenzyme Q10 (COQ-10) 100 MG Cap, CoQ-10  one daily, Disp: , Rfl:     docosahexanoic acid (OMEGA 3 FA) 1000 MG Cap, omega 3 350 mg-dha 235 mg-epa 90 mg-fish oil 597 mg capsule,delay rel  Take every day by oral route., Disp: , Rfl:     albuterol (VENTOLIN HFA) 108 (90 Base) MCG/ACT Aero Soln inhalation aerosol, Ventolin HFA 90 mcg/actuation aerosol inhaler  INHALE 1 TO 2 PUFFS BY MOUTH 4 TIMES DAILY AS NEEDED FOR SHORTNESS OF BREATH OR WHEEZING, Disp: , Rfl:     cycloSPORINE (RESTASIS) 0.05 % ophthalmic emulsion, Restasis 0.05 % eye drops in a dropperette  INSTILL 1 DROP INTO AFFECTED EYE(S) BY OPHTHALMIC ROUTE EVERY 12 HOURS, Disp: , Rfl:     nitrofurantoin (MACROBID) 100 MG Cap, nitrofurantoin monohydrate/macrocrystals 100 mg capsule, Disp: , Rfl:     alendronate (FOSAMAX) 70 MG Tab, TAKE 1 TABLET BY MOUTH ONCE A WEEK ON WEDNESDAY, Disp: 12 Tablet, Rfl: 1    losartan (COZAAR) 25 MG Tab, Take 1 Tablet by mouth every day., Disp: , Rfl:     sulindac (CLINORIL) 150 MG Tab, 1 tab po bid prn joint pain, take with food, Disp: 60 " Tablet, Rfl: 0    tamsulosin (FLOMAX) 0.4 MG capsule, Take 1 Capsule by mouth 1/2 hour after breakfast., Disp: , Rfl:     Cholecalciferol (VITAMIN D) 2000 units Cap, Take 1 Capsule by mouth every day., Disp: , Rfl:     acetaminophen (TYLENOL) 500 MG Tab, Take 3 Tablets by mouth every 6 hours as needed for Moderate Pain., Disp: , Rfl:     trazodone (DESYREL) 100 MG TABS, Take 0.5 Tablets by mouth every evening., Disp: , Rfl:       Social History     Tobacco Use    Smoking status: Former     Packs/day: 0.50     Years: 2.00     Pack years: 1.00     Types: Cigarettes     Quit date: 1960     Years since quittin.3    Smokeless tobacco: Never   Vaping Use    Vaping Use: Never used   Substance and Sexual Activity    Alcohol use: Yes     Comment: 3 per month    Drug use: No    Sexual activity: Not on file      Measures Administered:  [place] Naming Test (BNT); Brief Visuospatial Memory Test - Revised (BVMT-R); Petra-June Executive Functioning System (DKEFS): Color-Word Interference (CWI) & Verbal Fluency (VF); Executive Clock Drawing Test (CLOX); Generalized Anxiety Disorder 7 Item Scale (ARACELI-7); Lam Verbal Learning Test - Revised (HVLT-R); Mini-Mental State Examination - 2nd Ed. Orientation (MMSE-2); Patient Health Questionnaire (PHQ-9); Repeatable Battery for the Assessment of Neuropsychological Status Line Orientation (RBANS LO); Test of Premorbid Functioning (ToPF); Trail Making Test A & B (TMT); Wechsler Adult Intelligence Scale - 4th Ed. (WAIS-IV): Block Design (BD), Digit Span (DS), Matrix Reasoning (MR), Similarities (SI), Information (IN), & Coding (CD); and Wechsler Memory Scale - 4th Ed. Logical Memory (WMS-IV LM).    Behavioral Observations: The patient arrived at the clinic on time and was unaccompanied. She was well groomed and dressed. She was alert and fully oriented to situation, person, place, and time (MMSE-2 Orientation = 10/10). She was polite and always maintained appropriate interpersonal  boundaries. Rapport was easily established. Gait was unremarkable. No gross abnormal movements or motor symptoms were observed. Speech rate, volume, articulation, and prosody were normal. Speech content was logical and appropriate to context. Expressive and receptive language were intact. Mood was euthymic. Affect was mood-congruent and appropriate to the situation. Insight into cognitive and emotional functioning was intact. There was no indication of hallucinations, delusions, or thought disorder. Vision and hearing (corrected with bilateral hearing aids) were adequate for the evaluation. She was attentive throughout the evaluation and had no difficulties with retention of task demands. She worked efficiently for the duration of the evaluation. Response style was unremarkable. Overall, she was engaged and cooperative throughout testing.      Vern Tucker, Ph.D.          Clinical Neuropsychologist          Department of Neurology          [facility]           One hour and 12 minutes were spent interviewing the patient (neurobehavioral status exam: 27758 = 1). Test administration and scoring were completed in 4 hours and 8 minutes (27695 = 1, 98163 = 7).

## 2023-04-10 NOTE — PATIENT INSTRUCTIONS
Thank you for your effort during today's evaluation. We will have a feedback session to discuss your results on 05/01/2023 at 11 AM.

## 2023-04-18 ENCOUNTER — OFFICE VISIT (OUTPATIENT)
Dept: URGENT CARE | Facility: PHYSICIAN GROUP | Age: 88
End: 2023-04-18
Payer: MEDICARE

## 2023-04-18 VITALS
RESPIRATION RATE: 14 BRPM | BODY MASS INDEX: 22.76 KG/M2 | OXYGEN SATURATION: 98 % | WEIGHT: 145 LBS | TEMPERATURE: 97.7 F | HEIGHT: 67 IN | HEART RATE: 80 BPM | SYSTOLIC BLOOD PRESSURE: 118 MMHG | DIASTOLIC BLOOD PRESSURE: 62 MMHG

## 2023-04-18 DIAGNOSIS — R30.0 DYSURIA: ICD-10-CM

## 2023-04-18 LAB
APPEARANCE UR: CLEAR
BILIRUB UR STRIP-MCNC: NEGATIVE MG/DL
COLOR UR AUTO: YELLOW
GLUCOSE UR STRIP.AUTO-MCNC: NEGATIVE MG/DL
KETONES UR STRIP.AUTO-MCNC: NEGATIVE MG/DL
LEUKOCYTE ESTERASE UR QL STRIP.AUTO: NEGATIVE
NITRITE UR QL STRIP.AUTO: NEGATIVE
PH UR STRIP.AUTO: 5.5 [PH] (ref 5–8)
PROT UR QL STRIP: NEGATIVE MG/DL
RBC UR QL AUTO: NEGATIVE
SP GR UR STRIP.AUTO: 1.01
UROBILINOGEN UR STRIP-MCNC: 0.2 MG/DL

## 2023-04-18 PROCEDURE — 99213 OFFICE O/P EST LOW 20 MIN: CPT | Performed by: FAMILY MEDICINE

## 2023-04-18 PROCEDURE — 81002 URINALYSIS NONAUTO W/O SCOPE: CPT | Performed by: FAMILY MEDICINE

## 2023-04-18 ASSESSMENT — ENCOUNTER SYMPTOMS
RESPIRATORY NEGATIVE: 1
CONSTITUTIONAL NEGATIVE: 1
CARDIOVASCULAR NEGATIVE: 1

## 2023-04-18 ASSESSMENT — FIBROSIS 4 INDEX: FIB4 SCORE: 1.775117367011732739

## 2023-04-18 NOTE — PROGRESS NOTES
"Subjective:   Susan Menendez is a 87 y.o. female who presents for Dysuria (X 2 days , burning on urination )      Dysuria       Review of Systems   Constitutional: Negative.    HENT: Negative.     Respiratory: Negative.     Cardiovascular: Negative.    Genitourinary:  Positive for dysuria.   Skin: Negative.      Medications, Allergies, and current problem list reviewed today in Epic.     Objective:     /62 (BP Location: Left arm, Patient Position: Sitting, BP Cuff Size: Adult)   Pulse 80   Temp 36.5 °C (97.7 °F) (Temporal)   Resp 14   Ht 1.702 m (5' 7\")   Wt 65.8 kg (145 lb)   SpO2 98%     Physical Exam  Vitals and nursing note reviewed.   Constitutional:       Appearance: Normal appearance.   HENT:      Head: Normocephalic and atraumatic.   Cardiovascular:      Rate and Rhythm: Normal rate and regular rhythm.      Pulses: Normal pulses.      Heart sounds: Normal heart sounds.   Pulmonary:      Effort: Pulmonary effort is normal.      Breath sounds: Normal breath sounds.   Abdominal:      General: Abdomen is flat. Bowel sounds are normal.      Palpations: Abdomen is soft.   Genitourinary:     Vagina: No vaginal discharge.   Neurological:      Mental Status: She is alert.       Assessment/Plan:     Diagnosis and associated orders:     1. Dysuria           Comments/MDM:     Ua neg         Differential diagnosis, natural history, supportive care, and indications for immediate follow-up discussed.    Advised the patient to follow-up with the primary care physician for recheck, reevaluation, and consideration of further management.    Please note that this dictation was created using voice recognition software. I have made a reasonable attempt to correct obvious errors, but I expect that there are errors of grammar and possibly content that I did not discover before finalizing the note.    This note was electronically signed by Danny Duffy M.D.  "

## 2023-05-01 ENCOUNTER — APPOINTMENT (OUTPATIENT)
Dept: LAB | Facility: MEDICAL CENTER | Age: 88
End: 2023-05-01
Payer: MEDICARE

## 2023-05-02 ENCOUNTER — HOSPITAL ENCOUNTER (OUTPATIENT)
Dept: LAB | Facility: MEDICAL CENTER | Age: 88
End: 2023-05-02
Attending: INTERNAL MEDICINE
Payer: MEDICARE

## 2023-05-02 ENCOUNTER — TELEPHONE (OUTPATIENT)
Dept: RHEUMATOLOGY | Facility: MEDICAL CENTER | Age: 88
End: 2023-05-02

## 2023-05-02 PROCEDURE — 91065 BREATH HYDROGEN/METHANE TEST: CPT | Mod: 91

## 2023-05-02 PROCEDURE — 36415 COLL VENOUS BLD VENIPUNCTURE: CPT
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
## 2023-05-02 NOTE — TELEPHONE ENCOUNTER
Patient with OA, last seen 8/2022  Currently on Sulindac, ok to take tylenol with sulindac, also OTC lidocain patches if want narcotics will need referral to pain management    If want to change sulindac, we will need to see patient, or maybe contact PCP

## 2023-05-02 NOTE — TELEPHONE ENCOUNTER
Pt wants a refill of the sulindac. She will fax over recent labs from [facility]. for Dr Benavides to review.

## 2023-05-03 ENCOUNTER — TELEPHONE (OUTPATIENT)
Dept: NEUROLOGY | Facility: MEDICAL CENTER | Age: 88
End: 2023-05-03
Payer: MEDICARE

## 2023-05-03 NOTE — TELEPHONE ENCOUNTER

## 2023-05-08 ENCOUNTER — OFFICE VISIT (OUTPATIENT)
Dept: NEUROLOGY | Facility: MEDICAL CENTER | Age: 88
End: 2023-05-08
Attending: CLINICAL NEUROPSYCHOLOGIST
Payer: MEDICARE

## 2023-05-08 DIAGNOSIS — R41.9 COGNITIVE COMPLAINTS WITH NORMAL NEUROPSYCHOLOGICAL EXAM: ICD-10-CM

## 2023-05-08 PROCEDURE — 1125F AMNT PAIN NOTED PAIN PRSNT: CPT | Performed by: CLINICAL NEUROPSYCHOLOGIST

## 2023-05-08 PROCEDURE — 96132 NRPSYC TST EVAL PHYS/QHP 1ST: CPT | Performed by: CLINICAL NEUROPSYCHOLOGIST

## 2023-05-08 PROCEDURE — 96133 NRPSYC TST EVAL PHYS/QHP EA: CPT | Performed by: CLINICAL NEUROPSYCHOLOGIST

## 2023-05-08 NOTE — PROGRESS NOTES
NEUROPSYCHOLOGICAL FEEDBACK    Name: Susan Menendez    MRN: [id number]   YOB: 1935   Date of Feedback: 05/08/2023  Referred by: Ernesto Isbell M.D.   Evaluated by: Vern Tucker, Ph.D.        The patient was seen for an interactive feedback session regarding her neuropsychological evaluation on 04/10/2023. She reported she has been stable in terms of cognitive, functional, emotional, and physical functioning since the neuropsychological evaluation. I discussed the results of the evaluation and the recommendations in detail with the patient and she expressed understanding. The discussion included psychoeducation about normal cognitive decline related to aging, mild cognitive impairment, different types of dementia, and possible etiologies of her subjective cognitive difficulties. Psychoeducation was also provided regarding how depression, anxiety, poor sleep, post-traumatic stress, caregiver stress, and chronic pain can affect cognition. Additional information was provided regarding lifestyle factors associated with maintaining brain health. The patient was afforded time to ask questions and all her questions were addressed.    Vern Tucker, Ph.D.                                                                             Clinical Neuropsychologist                                                                               Department of Neurology                                                                      [facility]
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
## 2023-05-12 ENCOUNTER — OFFICE VISIT (OUTPATIENT)
Dept: NEUROLOGY | Facility: MEDICAL CENTER | Age: 88
End: 2023-05-12
Attending: PSYCHIATRY & NEUROLOGY
Payer: MEDICARE

## 2023-05-12 VITALS
WEIGHT: 146.61 LBS | DIASTOLIC BLOOD PRESSURE: 62 MMHG | BODY MASS INDEX: 23.01 KG/M2 | SYSTOLIC BLOOD PRESSURE: 114 MMHG | HEIGHT: 67 IN | TEMPERATURE: 97.8 F | OXYGEN SATURATION: 97 % | HEART RATE: 67 BPM

## 2023-05-12 DIAGNOSIS — R41.3 MILD MEMORY DISTURBANCE: ICD-10-CM

## 2023-05-12 PROBLEM — K82.8 BILIARY DYSKINESIA: Status: ACTIVE | Noted: 2023-05-12

## 2023-05-12 PROBLEM — M81.0 AGE-RELATED OSTEOPOROSIS WITHOUT CURRENT PATHOLOGICAL FRACTURE: Status: ACTIVE | Noted: 2023-05-12

## 2023-05-12 PROBLEM — K21.9 GASTRO-ESOPHAGEAL REFLUX DISEASE WITHOUT ESOPHAGITIS: Status: ACTIVE | Noted: 2023-05-12

## 2023-05-12 PROBLEM — K80.20 CHOLELITHIASIS: Status: ACTIVE | Noted: 2023-05-12

## 2023-05-12 PROBLEM — K75.4 AUTOIMMUNE HEPATITIS (HCC): Status: ACTIVE | Noted: 2023-05-12

## 2023-05-12 PROBLEM — R13.14 DYSPHAGIA, PHARYNGOESOPHAGEAL PHASE: Status: ACTIVE | Noted: 2023-05-12

## 2023-05-12 PROBLEM — E89.41: Status: ACTIVE | Noted: 2023-05-12

## 2023-05-12 PROBLEM — A04.9 BACTERIAL INTESTINAL INFECTION, UNSPECIFIED: Status: ACTIVE | Noted: 2023-05-12

## 2023-05-12 PROBLEM — K22.2 ESOPHAGEAL OBSTRUCTION: Status: ACTIVE | Noted: 2019-12-03

## 2023-05-12 PROBLEM — K57.30 DVRTCLOS OF LG INT W/O PERFORATION OR ABSCESS W/O BLEEDING: Status: ACTIVE | Noted: 2021-02-24

## 2023-05-12 PROCEDURE — 99215 OFFICE O/P EST HI 40 MIN: CPT | Performed by: PSYCHIATRY & NEUROLOGY

## 2023-05-12 PROCEDURE — 99212 OFFICE O/P EST SF 10 MIN: CPT | Performed by: PSYCHIATRY & NEUROLOGY

## 2023-05-12 PROCEDURE — 3078F DIAST BP <80 MM HG: CPT | Performed by: PSYCHIATRY & NEUROLOGY

## 2023-05-12 PROCEDURE — 1125F AMNT PAIN NOTED PAIN PRSNT: CPT | Performed by: PSYCHIATRY & NEUROLOGY

## 2023-05-12 PROCEDURE — 3074F SYST BP LT 130 MM HG: CPT | Performed by: PSYCHIATRY & NEUROLOGY

## 2023-05-12 RX ORDER — LEVOTHYROXINE SODIUM 0.15 MG/1
150 TABLET ORAL DAILY
COMMUNITY
Start: 2023-04-21

## 2023-05-12 RX ORDER — FLUTICASONE PROPIONATE 50 MCG
1 SPRAY, SUSPENSION (ML) NASAL DAILY
Status: ON HOLD | COMMUNITY
End: 2023-08-07

## 2023-05-12 ASSESSMENT — FIBROSIS 4 INDEX: FIB4 SCORE: 1.775117367011732739

## 2023-05-12 NOTE — PROGRESS NOTES
"Neuro follow up:    Memory and word retrieval complaints.    Last vist with me 2022     Susan Menendez 87 y.o. right handed woman who here alone.  She was  at [employer] when getting a Master's in English Literature.  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     Had a formal evaluation with Dr. Vern Tucker PhD in the last few months and the results are encouraging and were felt to be \"normal aging.\"     She had concern about whether she has memory disturbance despite any notable family history of these type of issues.     Historically she developed PTSD as of 22 years ago related to her son's health and witnessing he almost  at that time.     She has recognized that for example he she has some difficulty recalling name of an actor and actresses over 1 to 2 years. Sometimes she can't come up with the name of the person but if someone gave her a hint for the name she would easily able to recall it. This has been a consistent issue for her.     She has difficulty identifying once getting out of a store (such as [employer] or [employer] )- such as where she needs to go.  She has not gotten lost when driving home or 2 these location(s). She most recently went to [place] a few months ago and drove back and forth in one without difficulty.     She has not noticed problems reading or remembering information that she has read in the last 6-12 months.     There has been no falls or near falls in the recent months.    No history of concussion(s), stroke or seizure events known or documented in the Problem List.     She adamantly denies any problems with gait-balance or recent falls.     There is no history of significant alcohol or tobacco use in adult life.      Jessie denies paranoia,delusions,visual or auditory hallucinations in the recent motnhs.     Average Sleep-  about 8 hours most night ; uses melatonin  (5mg QHS) and small amount of trazadone> awakens 2 times to go to the bathroom most " nights but gets back to sleep easily.  Often awakens around 6:15 am.    No REM Sleep Behavioral Disorders.     No family history of notable memory,cognitive or dementia type issues known.  Father:  at age 86.  Mother:  at age 94        Patient Active Problem List    Diagnosis Date Noted    Pain in right foot 2022    Pain in right ankle 2022    Foot pain, left 2022    Pain of left foot 2022    Urinary tract infectious disease 2021    Slowing of urinary stream 2021    Depressive disorder 2021    Dysuria 2021    Recurrent urinary tract infection 03/10/2021    Compression fracture of thoracic vertebra (HCC) 2020    Chronic interstitial cystitis 2018    Low back pain 2018    Degenerative spondylolisthesis 2018    Increased frequency of urination 2018    History of recurrent urinary tract infection 2018    Fibromyalgia 2017    Diarrhea 2014    Arthritis 2014    Hearing loss 2014    Varicose veins 2014    Hypothyroidism 2014    Osteopenia 2014    Chondromalacia 2014       Past medical history:   Past Medical History:   Diagnosis Date    Anemia     Anesthesia     nausea    Arthritis 2018    osteo in spine, hands, neck    Autoimmune hepatitis (HCC)     Chronic diarrhea     Chronic UTI     COPD (chronic obstructive pulmonary disease) (HCC)     Depression     Hiatus hernia syndrome     History of cataract surgery     left    Hypothyroid     Interstitial cystitis     Other specified disorder of intestines     Pain     Pneumonia     30 years ago    Urinary bladder disorder     Varicose veins        Past surgical history:   Past Surgical History:   Procedure Laterality Date    PB RECONSTRUC TOE DEFORM,SOFT TISSUE Left 3/16/2022    Procedure: LEFT FOOT LESSER TOES FLEXOR TENDON RELEASE, REPAIRS AS INDICATED;  Surgeon: Frandy Carmen M.D.;  Location: [facility];   "Service: Orthopedics    NJ TENOTOMY PERC TOE SINGLE TENDON Right 3/16/2022    Procedure: RIGHT FOOT THIRD FLEXOR TENDON RELEASE;  Surgeon: Frandy Carmen M.D.;  Location: [facility];  Service: Orthopedics    PB OSTEOTOMY METATARSAL (NOT 1ST) Right 3/16/2022    Procedure: RIGHT BUNIONETTE CORRECTION, REPAIRS AS INDICATED;  Surgeon: Frandy Carmen M.D.;  Location: [facility];  Service: Orthopedics    FUSION, SPINE, LUMBAR, PLIF N/A 12/3/2018    Procedure: L4-5 TLIF;  Surgeon: Giancarlo Berman M.D.;  Location: [facility];  Service: Neurosurgery    DOREEN BY LAPAROSCOPY  2018    Procedure: DOREEN BY LAPAROSCOPY;  Surgeon: John H Ganser, M.D.;  Location: SURGERY [facility];  Service: General    BLADDER BIOPSY WITH CYSTOSCOPY  2012    Performed by JARAD GARCIA at SURGERY [facility]    OTHER      vein stripping both legs    OTHER      \"breast lumps removed\"    OTHER ORTHOPEDIC SURGERY      right knee scope    OTHER ORTHOPEDIC SURGERY      aileen foot surgery         Social history:   Social History     Socioeconomic History    Marital status:      Spouse name: Not on file    Number of children: Not on file    Years of education: Not on file    Highest education level: Not on file   Occupational History    Not on file   Tobacco Use    Smoking status: Former     Packs/day: 0.50     Years: 2.00     Pack years: 1.00     Types: Cigarettes     Quit date: 1960     Years since quittin.4    Smokeless tobacco: Never   Vaping Use    Vaping Use: Never used   Substance and Sexual Activity    Alcohol use: Yes     Comment: 3 per month    Drug use: No    Sexual activity: Not on file   Other Topics Concern    Not on file   Social History Narrative    Not on file     Social Determinants of Health     Financial Resource Strain: Not on file   Food Insecurity: Not on file   Transportation Needs: Not on file   Physical Activity: Not on file " "  Stress: Not on file   Social Connections: Not on file   Intimate Partner Violence: Not on file   Housing Stability: Not on file       Family history:   No family history on file.      Current medications:   Current Outpatient Medications   Medication    alendronate (FOSAMAX) 70 MG Tab    losartan (COZAAR) 25 MG Tab    sulindac (CLINORIL) 150 MG Tab    tamsulosin (FLOMAX) 0.4 MG capsule    Cholecalciferol (VITAMIN D) 2000 units Cap    acetaminophen (TYLENOL) 500 MG Tab    trazodone (DESYREL) 100 MG TABS    Coenzyme Q10 (COQ-10) 100 MG Cap    docosahexanoic acid (OMEGA 3 FA) 1000 MG Cap    albuterol (VENTOLIN HFA) 108 (90 Base) MCG/ACT Aero Soln inhalation aerosol    cycloSPORINE (RESTASIS) 0.05 % ophthalmic emulsion    nitrofurantoin (MACROBID) 100 MG Cap     No current facility-administered medications for this visit.       Medication Allergy:  Allergies   Allergen Reactions    Ceftin [Cefuroxime Axetil] Shortness of Breath     weaness     Amoxicillin      Unknown reaction    Augmentin Nausea     Stomach ache      Gluten Meal Unspecified     Weak and tongue breaks out     Keflex      rash    Macrodantin [Kdc:Red Dye+Yellow Dye+Ci Pigment Blue 63+Nitrofurantoin]     Morphine Nausea     extreme    Nitrofurantoin Nausea    Pcn [Penicillins] Shortness of Breath    Sulfa Drugs      Unknown rxn           Physical examination:   Vitals:    05/12/23 1109   BP: 114/62   Pulse: 67   Temp: 36.6 °C (97.8 °F)   TempSrc: Temporal   SpO2: 97%   Weight: 66.5 kg (146 lb 9.7 oz)   Height: 1.702 m (5' 7\")       Normal cephalic atraumatic.  There is full range of movement around the neck in all directions without restrictions or discrete pain evoked triggers.  No lower extremity edema.      Neurological  Exam:    Mental status: Awake, alert and fully oriented to person, place, time, and situation. Normal attention, concentration, and fund of knowledge for education level.  Did not appear/act " combative,irritable,anxious,paranoid/delusional or aggressive to or with me.    Speech and language: Speech is fluent without errors, clear, intact to repetition, and intact to naming.     Follows 3 step motor commands in sequence without significant delay and correctly.    Cranial nerve exam:  II: Pupils are equally round and reactive to light. Visual fields are intact by confrontation.  III, IV, VI: EOMI, no diplopia, no ptosis.  V: Sensation to light touch is normal over V1-3 distributions bilaterally.  .  VII: Facial movements are symmetrical. There is no facial droop. .  VIII: Hearing intact to soft speech and finger rub bilaterally  IX: Palate elevates symmetrically, uvula is midline. Dysarthria is not present.  XI: Shoulder shrug are symmetrical and strong.   XII: Tongue protrudes midline.      Motor exam:  Muscle tone is normal in all 4 limbs. and No abnormal movements appreciated.    Fast and symmetrical repetitive finger tapping bilaterally and with opening and closing hands.    Muscle strength:    Neck Flexors/Extensors: 5/5       Right  Left  Deltoid   5/5  5/5      Biceps   5/5  5/5  Triceps              5/5  5/5   Wrist extensors 5/5  5/5  Wrist flexors  5/5  5/5     5/5  5/5  Interossei  5/5  5/5  Thenar (APB)  5/5  5/5   Hip flexors  5/5  5/5  Quadriceps  5/5  5/5    Hamstrings  5/5  5/5  Dorsiflexors  5/5  5/5  Plantarflexors  5/5  5/5  Toe extension  5/5  5/5      Reflexes:       Right  Left  Biceps   2/4  2/4  Triceps              2/4  2/4  Brachioradialis  2/4  2/4  Knee jerk  2/4  2/4  Ankle jerk  2/4  2/4     Frontal release signs are absent    bilaterally toes are downgoing to plantar stimulation..    Coordination (finger-to-nose, heel/knee/shin, rapid alternating movements) was normal.     There was no ataxia, no tremors, and no dysmetria.     Station and gait were normal. Easily stands up from exam chair without retropulsion,veering,leaning,swaying (to either side).       Labs and  "Tests:    11/14/2022: Vitamin B1 and B12 and Folate reviewed.     NEUROIMAGING:     AMINATION:  1/5/2023 2:07 PM     HISTORY/REASON FOR EXAM:  Memory Loss     TECHNIQUE/EXAM DESCRIPTION:  MRI of the brain without and with contrast.     T1 sagittal, T2 fast spin-echo axial, T1 coronal, FLAIR coronal, Diffusion weighted and Apparent Diffusion Coefficient (ADC map) axial images were obtained of the whole brain. T1 post-contrast axial and T1 post-contrast coronal images were obtained.     The study was performed on a GE 1.5 Matilda MRI scanner.     COMPARISON: MRI scan of the brain 3/23/2015     FINDINGS:     The calvariae are normal.  There are no extra-axial fluid collections.     The ventricular system is mild to moderately prominent. There is mild-to-moderate prominence of the cortical sulci and gyri.  There are scattered punctate and patchy areas of increased T2 signal intensity in the periventricular and juxtacortical white   matter.  There are no mass effects or shift of midline structures.     The diffusion weighted images are unremarkable.     There are no hemorrhagic lesions.  The brainstem and posterior fossa structures are unremarkable.     Vascular flow voids in the carotid and vertebrobasilar arteries, [place], and dural venous sinuses are intact.     The paranasal sinuses and mastoid air cells are free of mucosal inflammatory change.           IMPRESSION:        1. Age-related cerebral atrophy.     2. Mild periventricular and juxtacortical white matter changes consistent with chronic microvascular ischemic gliosis.     3. No evidence of extra-axial fluid collection, intracranial mass effect, or acute infarction.           Exam Ended: 01/05/23  2:24 PM Last Resulted: 01/05/23  2:33 PM               Impression/Plans/Recommendations:    Age related memory decline- very slight/mild.    Formal Neuropsychological testing done with Vern Tucker PhD recently supporting \"normal aging.\"     Previously Jessie " "had  noticed several issues with word retrieval and some low level confusion when leaving large \"box\" stores.     She is aware that she has more difficulty with word retrieval as her prime complaint.       2. HTN- know and on medication (borderline elevated today).    She was encouraged to continue to check her BP routinely with goal under 130/80 long term.    Plans:    A. Brain Health topics reviewed today and some books to read discussed on these topics.       I have performed  a history and physical exam and a directed /focused  ROS today.    Total time spent today or this patient's care was  44 minutes  and included reviewing  the diagnostic workup to date (such as labs and imaging as well as interpreting such tests relevant to this patient's neurological condition),  reviewing/obtaining separately obtained history (from Jessie)  for today's neurological problem(s) ,counseling and educating Jessie on issues related to cognition/memory and cognitive health factors and documenting  the clinical information in the EMR.    Follow up at this point PRN.        Ernesto Isbell MD  [facility] [employer].   [facility]    "

## 2023-05-17 LAB — TEST NAME 95000: NORMAL

## 2023-07-17 ENCOUNTER — APPOINTMENT (OUTPATIENT)
Dept: ADMISSIONS | Facility: MEDICAL CENTER | Age: 88
End: 2023-07-17
Attending: ORTHOPAEDIC SURGERY
Payer: MEDICARE

## 2023-07-19 ENCOUNTER — HOSPITAL ENCOUNTER (OUTPATIENT)
Dept: LAB | Facility: MEDICAL CENTER | Age: 88
End: 2023-07-19
Attending: FAMILY MEDICINE
Payer: MEDICARE

## 2023-07-19 LAB
BASOPHILS # BLD AUTO: 1.4 % (ref 0–1.8)
BASOPHILS # BLD: 0.06 K/UL (ref 0–0.12)
EOSINOPHIL # BLD AUTO: 0.12 K/UL (ref 0–0.51)
EOSINOPHIL NFR BLD: 2.7 % (ref 0–6.9)
ERYTHROCYTE [DISTWIDTH] IN BLOOD BY AUTOMATED COUNT: 47.1 FL (ref 35.9–50)
HCT VFR BLD AUTO: 41.4 % (ref 37–47)
HGB BLD-MCNC: 13.7 G/DL (ref 12–16)
IMM GRANULOCYTES # BLD AUTO: 0.01 K/UL (ref 0–0.11)
IMM GRANULOCYTES NFR BLD AUTO: 0.2 % (ref 0–0.9)
LYMPHOCYTES # BLD AUTO: 1.66 K/UL (ref 1–4.8)
LYMPHOCYTES NFR BLD: 37.4 % (ref 22–41)
MCH RBC QN AUTO: 31.1 PG (ref 27–33)
MCHC RBC AUTO-ENTMCNC: 33.1 G/DL (ref 32.2–35.5)
MCV RBC AUTO: 94.1 FL (ref 81.4–97.8)
MONOCYTES # BLD AUTO: 0.49 K/UL (ref 0–0.85)
MONOCYTES NFR BLD AUTO: 11 % (ref 0–13.4)
NEUTROPHILS # BLD AUTO: 2.1 K/UL (ref 1.82–7.42)
NEUTROPHILS NFR BLD: 47.3 % (ref 44–72)
NRBC # BLD AUTO: 0 K/UL
NRBC BLD-RTO: 0 /100 WBC (ref 0–0.2)
PLATELET # BLD AUTO: 271 K/UL (ref 164–446)
PMV BLD AUTO: 10.6 FL (ref 9–12.9)
RBC # BLD AUTO: 4.4 M/UL (ref 4.2–5.4)
WBC # BLD AUTO: 4.4 K/UL (ref 4.8–10.8)

## 2023-07-19 PROCEDURE — 84480 ASSAY TRIIODOTHYRONINE (T3): CPT

## 2023-07-19 PROCEDURE — 36415 COLL VENOUS BLD VENIPUNCTURE: CPT

## 2023-07-19 PROCEDURE — 80053 COMPREHEN METABOLIC PANEL: CPT

## 2023-07-19 PROCEDURE — 84443 ASSAY THYROID STIM HORMONE: CPT

## 2023-07-19 PROCEDURE — 86141 C-REACTIVE PROTEIN HS: CPT

## 2023-07-19 PROCEDURE — 84436 ASSAY OF TOTAL THYROXINE: CPT

## 2023-07-19 PROCEDURE — 85025 COMPLETE CBC W/AUTO DIFF WBC: CPT

## 2023-07-19 PROCEDURE — 80061 LIPID PANEL: CPT

## 2023-07-19 PROCEDURE — 83880 ASSAY OF NATRIURETIC PEPTIDE: CPT

## 2023-07-20 LAB
ALBUMIN SERPL BCP-MCNC: 4.4 G/DL (ref 3.2–4.9)
ALBUMIN/GLOB SERPL: 1.6 G/DL
ALP SERPL-CCNC: 60 U/L (ref 30–99)
ALT SERPL-CCNC: 17 U/L (ref 2–50)
ANION GAP SERPL CALC-SCNC: 14 MMOL/L (ref 7–16)
AST SERPL-CCNC: 26 U/L (ref 12–45)
BILIRUB SERPL-MCNC: 0.4 MG/DL (ref 0.1–1.5)
BUN SERPL-MCNC: 20 MG/DL (ref 8–22)
CALCIUM ALBUM COR SERPL-MCNC: 9.8 MG/DL (ref 8.5–10.5)
CALCIUM SERPL-MCNC: 10.1 MG/DL (ref 8.5–10.5)
CHLORIDE SERPL-SCNC: 101 MMOL/L (ref 96–112)
CHOLEST SERPL-MCNC: 171 MG/DL (ref 100–199)
CO2 SERPL-SCNC: 22 MMOL/L (ref 20–33)
CREAT SERPL-MCNC: 0.9 MG/DL (ref 0.5–1.4)
CRP SERPL HS-MCNC: 1.1 MG/L (ref 0–3)
FASTING STATUS PATIENT QL REPORTED: NORMAL
GFR SERPLBLD CREATININE-BSD FMLA CKD-EPI: 62 ML/MIN/1.73 M 2
GLOBULIN SER CALC-MCNC: 2.8 G/DL (ref 1.9–3.5)
GLUCOSE SERPL-MCNC: 76 MG/DL (ref 65–99)
HDLC SERPL-MCNC: 86 MG/DL
LDLC SERPL CALC-MCNC: 76 MG/DL
NT-PROBNP SERPL IA-MCNC: 348 PG/ML (ref 0–125)
POTASSIUM SERPL-SCNC: 4.5 MMOL/L (ref 3.6–5.5)
PROT SERPL-MCNC: 7.2 G/DL (ref 6–8.2)
SODIUM SERPL-SCNC: 137 MMOL/L (ref 135–145)
T3 SERPL-MCNC: 65.2 NG/DL (ref 60–181)
T4 SERPL-MCNC: 6.1 UG/DL (ref 4–12)
TRIGL SERPL-MCNC: 46 MG/DL (ref 0–149)
TSH SERPL DL<=0.005 MIU/L-ACNC: 0.82 UIU/ML (ref 0.38–5.33)

## 2023-07-24 ENCOUNTER — PRE-ADMISSION TESTING (OUTPATIENT)
Dept: ADMISSIONS | Facility: MEDICAL CENTER | Age: 88
End: 2023-07-24
Attending: ORTHOPAEDIC SURGERY
Payer: MEDICARE

## 2023-07-24 RX ORDER — POLYETHYLENE GLYCOL 3350 17 G/17G
17 POWDER, FOR SOLUTION ORAL DAILY
COMMUNITY

## 2023-07-24 RX ORDER — WHEAT DEXTRIN 3 G/3.8 G
1 POWDER (GRAM) ORAL DAILY
COMMUNITY

## 2023-07-24 RX ORDER — IPRATROPIUM BROMIDE 42 UG/1
SPRAY, METERED NASAL
Status: ON HOLD | COMMUNITY
Start: 2023-06-12 | End: 2023-08-07

## 2023-07-25 ENCOUNTER — APPOINTMENT (OUTPATIENT)
Dept: ADMISSIONS | Facility: MEDICAL CENTER | Age: 88
End: 2023-07-25
Attending: ORTHOPAEDIC SURGERY
Payer: MEDICARE

## 2023-07-25 DIAGNOSIS — Z01.810 PRE-OPERATIVE CARDIOVASCULAR EXAMINATION: ICD-10-CM

## 2023-07-25 LAB — EKG IMPRESSION: NORMAL

## 2023-07-25 PROCEDURE — 93005 ELECTROCARDIOGRAM TRACING: CPT

## 2023-07-25 PROCEDURE — 93010 ELECTROCARDIOGRAM REPORT: CPT | Performed by: INTERNAL MEDICINE

## 2023-08-03 ENCOUNTER — OFFICE VISIT (OUTPATIENT)
Dept: RHEUMATOLOGY | Facility: MEDICAL CENTER | Age: 88
End: 2023-08-03
Attending: INTERNAL MEDICINE
Payer: MEDICARE

## 2023-08-03 VITALS
HEART RATE: 76 BPM | TEMPERATURE: 97.9 F | WEIGHT: 141 LBS | DIASTOLIC BLOOD PRESSURE: 80 MMHG | SYSTOLIC BLOOD PRESSURE: 122 MMHG | RESPIRATION RATE: 14 BRPM | OXYGEN SATURATION: 97 % | BODY MASS INDEX: 22.08 KG/M2

## 2023-08-03 DIAGNOSIS — Z79.1 NSAID LONG-TERM USE: ICD-10-CM

## 2023-08-03 DIAGNOSIS — M15.9 PRIMARY OSTEOARTHRITIS INVOLVING MULTIPLE JOINTS: ICD-10-CM

## 2023-08-03 DIAGNOSIS — E03.9 HYPOTHYROIDISM, UNSPECIFIED TYPE: ICD-10-CM

## 2023-08-03 DIAGNOSIS — R41.89 COGNITIVE CHANGE: ICD-10-CM

## 2023-08-03 DIAGNOSIS — M81.0 SENILE OSTEOPOROSIS: ICD-10-CM

## 2023-08-03 PROCEDURE — 99213 OFFICE O/P EST LOW 20 MIN: CPT | Performed by: INTERNAL MEDICINE

## 2023-08-03 PROCEDURE — 3074F SYST BP LT 130 MM HG: CPT | Performed by: INTERNAL MEDICINE

## 2023-08-03 PROCEDURE — 3079F DIAST BP 80-89 MM HG: CPT | Performed by: INTERNAL MEDICINE

## 2023-08-03 PROCEDURE — 99212 OFFICE O/P EST SF 10 MIN: CPT | Performed by: INTERNAL MEDICINE

## 2023-08-03 RX ORDER — EPINEPHRINE 1 MG/ML(1)
0.5 AMPUL (ML) INJECTION PRN
Status: CANCELLED | OUTPATIENT
Start: 2023-08-03

## 2023-08-03 RX ORDER — SULINDAC 200 MG/1
TABLET ORAL
Qty: 180 TABLET | Refills: 1 | Status: ON HOLD | OUTPATIENT
Start: 2023-08-03 | End: 2023-08-07

## 2023-08-03 RX ORDER — DIPHENHYDRAMINE HYDROCHLORIDE 50 MG/ML
50 INJECTION INTRAMUSCULAR; INTRAVENOUS PRN
Status: CANCELLED | OUTPATIENT
Start: 2023-08-03

## 2023-08-03 RX ORDER — METHYLPREDNISOLONE SODIUM SUCCINATE 125 MG/2ML
125 INJECTION, POWDER, LYOPHILIZED, FOR SOLUTION INTRAMUSCULAR; INTRAVENOUS PRN
Status: CANCELLED | OUTPATIENT
Start: 2023-08-03

## 2023-08-03 ASSESSMENT — FIBROSIS 4 INDEX: FIB4 SCORE: 1.98

## 2023-08-03 NOTE — PROGRESS NOTES
"Chief Complaint- joint pain     Subjective:   Susan Menendez is a 87 y.o. female here today for follow up of rheumatological issues        Of note, patient likes to be called \"Johnathan\"    This is a follow-up visit for this patient who we see in this clinic for DJD and osteoporosis.  Patient does have a distant remote history of PMR for which recent CRP has been normal. CRP 1.1 nl 7/2023    Patient here to follow-up, states that the sulindac 150 mg p.o. twice daily did not make much of a difference wonders about getting a higher dose.  Patient denies any side effects from the medication, denies any unexplained weight loss, denies any fevers of unknown etiology, denies any GI upset, denies any rashes, denies any new joint swelling, denies recurrent infections.       Additionally patient status post bone density scan that does show osteoporosis.  Patient is currently on Fosamax 70 mg p.o. q. weeks since about 2018.  Patient denies any fractures since last visit.       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  Additional comorbidities include a diagnosis of autoimmune hepatitis and interstitial cystitis.  Patient also states she had a history of fibromyalgia in the past.  Patient also shares today that she has a past diagnosis of PTSD starting with her sons strokes, patient has not sought any treatment in the past.  Patient also history of low back surgery with benefit in December 2018.      At last visit patient states she feels she may be having some memory problems, status post referral to neurology for cognitive testing for which patient did not follow through.         AMA neg 8/2021  F-actin neg 8/2021  SSA neg 8/2021  SSB neg 8/2021   Uric acid 3.4 2/2013; Uric acid 2.7 9/2020  RF neg 2/2013  CCP neg 2/2015  MARLEE neg 2/2015  DEXA 10/2014 T scores -2.3, " -1.2  DEXA 11/8/2016 T scores -0.8, -2.3    FRAX 11/8/2016 major osteoporotic fracture risk is 23.8%, hip fracture risk 8.4%  DEXA 3/7/2018 T scores -0.6, -2.1  FRAX 3/7/2018 major osteoporotic fracture risk 26.1%, hip fracture risk 9.5%  DEXA 9/9/2020 T scores -4.7 (forearm), -2.4  FRAX 9/9/2020 major osteoporotic fracture risk 26.8%, hip fracture risk 10.5%    DEXA 11/29/2022 T scores -4.7, -2.1  FRAX 11/29/2022 major osteoporotic fracture risk 25.2%, hip fracture risk 8.8%     Patient has been on Fosamax since March 2018-Fosamax stopped 8/2023, Prolia 60 mg SQ every 6 months started 8/2023     Hand x-rays 9/2020-indicates  DJD  MRI LS spine 10/2016-indicates DJD and mild to moderate neural foraminal narrowing   Left shoulder x-rays 7/2019-   Impression       1.  Mild degenerative change of LEFT shoulder.  2.  No fracture or dislocation.      Right shoulder x-rays 7/2019 -  Impression       1.  Moderate right acromioclavicular osteoarthrosis.  2.  Mild glenohumeral osteoarthrosis.  3.  No acute fracture or dislocation.              Current Outpatient Medications   Medication Sig Dispense Refill    acetaminophen (TYLENOL) 500 MG Tab Take 2 tabs up to 3 times a day prn pain 60 Tablet 0    sulindac (CLINORIL) 200 MG Tab 1 tab po bid prn joint pain 180 Tablet 1    Wheat Dextrin (BENEFIBER) Powder Take  by mouth every day.      polyethylene glycol 3350 (MIRALAX) 17 GM/SCOOP Powder Take 17 g by mouth every day.      fluticasone (FLONASE) 50 MCG/ACT nasal spray       levothyroxine (SYNTHROID) 150 MCG Tab Take 150 mcg by mouth every day.      losartan (COZAAR) 25 MG Tab Take 12.5 mg by mouth 1 time a day as needed (as needed based on blood pressure).      tamsulosin (FLOMAX) 0.4 MG capsule Take 1 Capsule by mouth 1/2 hour after breakfast.      Cholecalciferol (VITAMIN D) 2000 units Cap Take 1 Capsule by mouth every day.      acetaminophen (TYLENOL) 500 MG Tab Take 3 Tablets by mouth every 6 hours as needed for Moderate  Pain.      trazodone (DESYREL) 100 MG TABS Take 25 mg by mouth every evening.      oxyCODONE immediate-release (ROXICODONE) 5 MG Tab Take 1 Tablet by mouth every four hours as needed for Severe Pain for up to 5 days. (Patient not taking: Reported on 8/3/2023) 30 Tablet 0    gabapentin (NEURONTIN) 300 MG Cap Take 1 po qhs (Patient not taking: Reported on 8/3/2023) 7 Capsule 0    hydrOXYzine pamoate (VISTARIL) 50 MG Cap Take 1 Capsule by mouth 3 times a day as needed for Itching or Other (nausea, anxiety, insomnia). (Patient not taking: Reported on 8/3/2023) 20 Capsule 1    ipratropium (ATROVENT) 0.06 % Solution USE 2 SPRAY(S) IN EACH NOSTRIL THREE TIMES DAILY AS NEEDED (Patient not taking: Reported on 8/3/2023)       No current facility-administered medications for this visit.     She  has a past medical history of Anemia, Anesthesia, Arthritis (02/06/2018), Autoimmune hepatitis ([facility]), Breath shortness (07/24/2023), Cataract (07/24/2023), Chronic diarrhea, Chronic UTI, COPD (chronic obstructive pulmonary disease) ([facility]), COVID-19 (07/24/2023), Depression, Hiatus hernia syndrome, History of cataract surgery, Hypertension (07/24/2023), Hypothyroid, Interstitial cystitis, Other specified disorder of intestines (07/24/2023), Pain, Pneumonia, Rhinitis, Urinary bladder disorder, and Varicose veins.    ROS   Other than what is mentioned in HPI or physical exam, there is no history of headaches, double vision or blurred vision.  No trouble swallowing difficulties .  No chest complaints including chest pain, cough or sputum production. No GI complaints including nausea, vomiting, change in bowel habits, or past peptic ulcer disease. No history of blood in the stools. No urinary complaints including dysuria or frequency. No history of alopecia, photosensitivity     Objective:     /80   Pulse 76   Temp 36.6 °C (97.9 °F) (Temporal)   Resp 14   Wt 64 kg (141 lb)   SpO2 97%  Body mass index is 22.08 kg/m².   Physical  Exam:    Constitutional: Alert and oriented X3, patient is talkative with good eye contact.Skin: Warm, dry, good turgor, no rashes in visible areas.Eye: Equal, round and reactive, conjunctiva clear, lids normal EOM intactENMT: Lips without lesions,  pinna without deformityNeck: Trachea midline, no masses, no thyromegaly.Lymph:  No cervical lymphadenopathy, no axillary lymphadenopathy, no supraclavicular lymphadenopathyRespiratory: Unlabored respiratory effort, lungs clear to auscultation, no wheezes, no ronchi.Cardiovascular: Normal S1, S2, Regular rate and rhythm, no murmurs rubs or gallops  .Abdomen: Soft, non-distended.Psych: Alert and oriented x3, normal affect and mood.Neuro: Cranial nerves 2-12 are grossly intact Ext:no joint laxity noted in bilateral arms, no joint laxity noted in bilateral legs, evidence nodes on most DIP joints both hands, shoulders full range of motion without limitations, elbows without nodules and no flexion contractures, crepitus in knees but no synovitis, toes without crossover toes and without splay toes, gait without antalgia without foot drop    Lab Results   Component Value Date/Time    SODIUM 137 07/19/2023 07:15 AM    POTASSIUM 4.5 07/19/2023 07:15 AM    CHLORIDE 101 07/19/2023 07:15 AM    CO2 22 07/19/2023 07:15 AM    GLUCOSE 76 07/19/2023 07:15 AM    BUN 20 07/19/2023 07:15 AM    CREATININE 0.90 07/19/2023 07:15 AM    CREATININE 0.9 01/04/2005 12:05 PM      Lab Results   Component Value Date/Time    WBC 4.4 (L) 07/19/2023 07:15 AM    RBC 4.40 07/19/2023 07:15 AM    HEMOGLOBIN 13.7 07/19/2023 07:15 AM    HEMATOCRIT 41.4 07/19/2023 07:15 AM    MCV 94.1 07/19/2023 07:15 AM    MCH 31.1 07/19/2023 07:15 AM    MCHC 33.1 07/19/2023 07:15 AM    MPV 10.6 07/19/2023 07:15 AM    NEUTSPOLYS 47.30 07/19/2023 07:15 AM    LYMPHOCYTES 37.40 07/19/2023 07:15 AM    MONOCYTES 11.00 07/19/2023 07:15 AM    EOSINOPHILS 2.70 07/19/2023 07:15 AM    BASOPHILS 1.40 07/19/2023 07:15 AM      Lab Results    Component Value Date/Time    CALCIUM 10.1 07/19/2023 07:15 AM    ASTSGOT 26 07/19/2023 07:15 AM    ALTSGPT 17 07/19/2023 07:15 AM    ALKPHOSPHAT 60 07/19/2023 07:15 AM    TBILIRUBIN 0.4 07/19/2023 07:15 AM    ALBUMIN 4.4 07/19/2023 07:15 AM    ALBUMIN 4.29 03/05/2013 09:00 AM    TOTPROTEIN 7.2 07/19/2023 07:15 AM    TOTPROTEIN 7.60 03/05/2013 09:00 AM     Lab Results   Component Value Date/Time    URICACID 2.7 09/08/2020 12:56 PM    RHEUMFACTN 8 02/27/2013 10:56 AM    CCPANTIBODY 3 02/27/2015 08:44 AM    ANTINUCAB None Detected 02/27/2015 08:44 AM     Lab Results   Component Value Date/Time    ANTISSBSJ 0 08/05/2021 01:04 PM     Lab Results   Component Value Date/Time    SEDRATEWES 11 10/25/2022 12:48 PM     Lab Results   Component Value Date/Time    HBA1C 5.5 05/19/2014 09:25 AM     Lab Results   Component Value Date/Time    CPKTOTAL 84 11/30/2016 10:32 AM     Lab Results   Component Value Date/Time    ANTIMITOCHO 2.8 08/05/2021 01:04 PM    FACTIN 13 08/05/2021 01:04 PM     Assessment and Plan:     1. Primary osteoarthritis involving multiple joints  We will do a trial of sulindac at 200 mg p.o. twice daily,  - sulindac (CLINORIL) 200 MG Tab; 1 tab po bid prn joint pain  Dispense: 180 Tablet; Refill: 1  - Comp Metabolic Panel; Future  - CBC WITH DIFFERENTIAL; Future  - Sed Rate; Future    2. NSAID long-term use  Currently on sulindac 200 mg p.o. twice daily, patient needs monitoring labs about every 6 months, next labs due January 2024, labs ordered for patient  - sulindac (CLINORIL) 200 MG Tab; 1 tab po bid prn joint pain  Dispense: 180 Tablet; Refill: 1  - Comp Metabolic Panel; Future  - CBC WITH DIFFERENTIAL; Future  - Sed Rate; Future    3. Senile osteoporosis  Last DEXA November 2022 next DEXA November 2024, patient is currently on Fosamax 70 mg p.o. q. weeks since about 2018, because its been close to 5 years on Fosamax we will switch to Prolia 60 mg subcu every 6 months, discussed with patient patient  states understanding.  - sulindac (CLINORIL) 200 MG Tab; 1 tab po bid prn joint pain  Dispense: 180 Tablet; Refill: 1  - Comp Metabolic Panel; Future  - CBC WITH DIFFERENTIAL; Future  - Sed Rate; Future    4. Hypothyroidism, unspecified type  Followed by patients PCP, can exacerbate joint pain, I recommend monitoring thyroid on a regular basis to assure it is not contributing to the patient's joint pain    5. Cognitive change  We will to neurology submitted previously for which patient opted out    Followup: Return in about 1 year (around 8/3/2024). or sooner prn    Darlene Gemma  was seen 30 minutes face-to-face of which more than 50% of the time was spent counseling the patient (excluding time for procedures)  regarding  rheumatological condition and care. Therapy was discussed in detail.      Please note that this dictation was created using voice recognition software. I have made every reasonable attempt to correct obvious errors, but I expect that there are errors of grammar and possibly content that I did not discover before finalizing the note.

## 2023-08-06 ENCOUNTER — TELEPHONE (OUTPATIENT)
Dept: ONCOLOGY | Facility: MEDICAL CENTER | Age: 88
End: 2023-08-06
Payer: MEDICARE

## 2023-08-07 ENCOUNTER — HOSPITAL ENCOUNTER (OUTPATIENT)
Facility: MEDICAL CENTER | Age: 88
End: 2023-08-07
Attending: ORTHOPAEDIC SURGERY | Admitting: ORTHOPAEDIC SURGERY
Payer: MEDICARE

## 2023-08-07 ENCOUNTER — ANESTHESIA (OUTPATIENT)
Dept: SURGERY | Facility: MEDICAL CENTER | Age: 88
End: 2023-08-07
Payer: MEDICARE

## 2023-08-07 ENCOUNTER — ANESTHESIA EVENT (OUTPATIENT)
Dept: SURGERY | Facility: MEDICAL CENTER | Age: 88
End: 2023-08-07
Payer: MEDICARE

## 2023-08-07 ENCOUNTER — APPOINTMENT (OUTPATIENT)
Dept: RADIOLOGY | Facility: MEDICAL CENTER | Age: 88
End: 2023-08-07
Attending: ORTHOPAEDIC SURGERY
Payer: MEDICARE

## 2023-08-07 VITALS
OXYGEN SATURATION: 95 % | HEIGHT: 67 IN | SYSTOLIC BLOOD PRESSURE: 166 MMHG | WEIGHT: 141.76 LBS | DIASTOLIC BLOOD PRESSURE: 75 MMHG | BODY MASS INDEX: 22.25 KG/M2 | RESPIRATION RATE: 16 BRPM | HEART RATE: 69 BPM | TEMPERATURE: 97.6 F

## 2023-08-07 DIAGNOSIS — M79.671 PAIN IN RIGHT FOOT: ICD-10-CM

## 2023-08-07 PROCEDURE — 160029 HCHG SURGERY MINUTES - 1ST 30 MINS LEVEL 4: Performed by: ORTHOPAEDIC SURGERY

## 2023-08-07 PROCEDURE — 28122 PARTIAL REMOVAL OF FOOT BONE: CPT | Mod: T9 | Performed by: ORTHOPAEDIC SURGERY

## 2023-08-07 PROCEDURE — 28122 PARTIAL REMOVAL OF FOOT BONE: CPT | Mod: 80ROC,T9 | Performed by: STUDENT IN AN ORGANIZED HEALTH CARE EDUCATION/TRAINING PROGRAM

## 2023-08-07 PROCEDURE — 160009 HCHG ANES TIME/MIN: Performed by: ORTHOPAEDIC SURGERY

## 2023-08-07 PROCEDURE — 160025 RECOVERY II MINUTES (STATS): Performed by: ORTHOPAEDIC SURGERY

## 2023-08-07 PROCEDURE — 28313 REPAIR DEFORMITY OF TOE: CPT | Mod: T9 | Performed by: ORTHOPAEDIC SURGERY

## 2023-08-07 PROCEDURE — 160035 HCHG PACU - 1ST 60 MINS PHASE I: Performed by: ORTHOPAEDIC SURGERY

## 2023-08-07 PROCEDURE — 160036 HCHG PACU - EA ADDL 30 MINS PHASE I: Performed by: ORTHOPAEDIC SURGERY

## 2023-08-07 PROCEDURE — 700111 HCHG RX REV CODE 636 W/ 250 OVERRIDE (IP): Performed by: ANESTHESIOLOGY

## 2023-08-07 PROCEDURE — 700101 HCHG RX REV CODE 250: Performed by: ANESTHESIOLOGY

## 2023-08-07 PROCEDURE — 700102 HCHG RX REV CODE 250 W/ 637 OVERRIDE(OP): Performed by: ANESTHESIOLOGY

## 2023-08-07 PROCEDURE — 8968 PR NO CHARGE - PROCEDURE: Mod: 80ROC | Performed by: STUDENT IN AN ORGANIZED HEALTH CARE EDUCATION/TRAINING PROGRAM

## 2023-08-07 PROCEDURE — 700105 HCHG RX REV CODE 258: Mod: JZ | Performed by: ORTHOPAEDIC SURGERY

## 2023-08-07 PROCEDURE — 160041 HCHG SURGERY MINUTES - EA ADDL 1 MIN LEVEL 4: Performed by: ORTHOPAEDIC SURGERY

## 2023-08-07 PROCEDURE — 160046 HCHG PACU - 1ST 60 MINS PHASE II: Performed by: ORTHOPAEDIC SURGERY

## 2023-08-07 PROCEDURE — 160002 HCHG RECOVERY MINUTES (STAT): Performed by: ORTHOPAEDIC SURGERY

## 2023-08-07 PROCEDURE — A9270 NON-COVERED ITEM OR SERVICE: HCPCS | Performed by: ANESTHESIOLOGY

## 2023-08-07 PROCEDURE — 700101 HCHG RX REV CODE 250: Performed by: ORTHOPAEDIC SURGERY

## 2023-08-07 PROCEDURE — 160048 HCHG OR STATISTICAL LEVEL 1-5: Performed by: ORTHOPAEDIC SURGERY

## 2023-08-07 RX ORDER — LIDOCAINE HYDROCHLORIDE 20 MG/ML
INJECTION, SOLUTION EPIDURAL; INFILTRATION; INTRACAUDAL; PERINEURAL PRN
Status: DISCONTINUED | OUTPATIENT
Start: 2023-08-07 | End: 2023-08-07 | Stop reason: SURG

## 2023-08-07 RX ORDER — BUPIVACAINE HYDROCHLORIDE 5 MG/ML
INJECTION, SOLUTION EPIDURAL; INTRACAUDAL
Status: DISCONTINUED | OUTPATIENT
Start: 2023-08-07 | End: 2023-08-07 | Stop reason: HOSPADM

## 2023-08-07 RX ORDER — HYDROMORPHONE HYDROCHLORIDE 1 MG/ML
0.1 INJECTION, SOLUTION INTRAMUSCULAR; INTRAVENOUS; SUBCUTANEOUS
Status: DISCONTINUED | OUTPATIENT
Start: 2023-08-07 | End: 2023-08-07 | Stop reason: HOSPADM

## 2023-08-07 RX ORDER — HYDROMORPHONE HYDROCHLORIDE 1 MG/ML
0.4 INJECTION, SOLUTION INTRAMUSCULAR; INTRAVENOUS; SUBCUTANEOUS
Status: DISCONTINUED | OUTPATIENT
Start: 2023-08-07 | End: 2023-08-07 | Stop reason: HOSPADM

## 2023-08-07 RX ORDER — SODIUM CHLORIDE, SODIUM LACTATE, POTASSIUM CHLORIDE, CALCIUM CHLORIDE 600; 310; 30; 20 MG/100ML; MG/100ML; MG/100ML; MG/100ML
INJECTION, SOLUTION INTRAVENOUS CONTINUOUS
Status: DISCONTINUED | OUTPATIENT
Start: 2023-08-07 | End: 2023-08-07 | Stop reason: HOSPADM

## 2023-08-07 RX ORDER — CEFAZOLIN SODIUM 1 G/3ML
INJECTION, POWDER, FOR SOLUTION INTRAMUSCULAR; INTRAVENOUS PRN
Status: DISCONTINUED | OUTPATIENT
Start: 2023-08-07 | End: 2023-08-07 | Stop reason: SURG

## 2023-08-07 RX ORDER — OXYCODONE HCL 5 MG/5 ML
10 SOLUTION, ORAL ORAL
Status: COMPLETED | OUTPATIENT
Start: 2023-08-07 | End: 2023-08-07

## 2023-08-07 RX ORDER — CEFAZOLIN SODIUM 1 G/3ML
2 INJECTION, POWDER, FOR SOLUTION INTRAMUSCULAR; INTRAVENOUS ONCE
Status: DISCONTINUED | OUTPATIENT
Start: 2023-08-07 | End: 2023-08-07 | Stop reason: HOSPADM

## 2023-08-07 RX ORDER — ACETAMINOPHEN 500 MG
1000 TABLET ORAL ONCE
Status: COMPLETED | OUTPATIENT
Start: 2023-08-07 | End: 2023-08-07

## 2023-08-07 RX ORDER — OXYCODONE HCL 5 MG/5 ML
5 SOLUTION, ORAL ORAL
Status: COMPLETED | OUTPATIENT
Start: 2023-08-07 | End: 2023-08-07

## 2023-08-07 RX ORDER — MAGNESIUM HYDROXIDE 1200 MG/15ML
LIQUID ORAL
Status: COMPLETED | OUTPATIENT
Start: 2023-08-07 | End: 2023-08-07

## 2023-08-07 RX ORDER — ONDANSETRON 2 MG/ML
4 INJECTION INTRAMUSCULAR; INTRAVENOUS
Status: DISCONTINUED | OUTPATIENT
Start: 2023-08-07 | End: 2023-08-07 | Stop reason: HOSPADM

## 2023-08-07 RX ORDER — EPHEDRINE SULFATE 50 MG/ML
5 INJECTION, SOLUTION INTRAVENOUS
Status: DISCONTINUED | OUTPATIENT
Start: 2023-08-07 | End: 2023-08-07 | Stop reason: HOSPADM

## 2023-08-07 RX ORDER — HYDROMORPHONE HYDROCHLORIDE 1 MG/ML
0.2 INJECTION, SOLUTION INTRAMUSCULAR; INTRAVENOUS; SUBCUTANEOUS
Status: DISCONTINUED | OUTPATIENT
Start: 2023-08-07 | End: 2023-08-07 | Stop reason: HOSPADM

## 2023-08-07 RX ORDER — LABETALOL HYDROCHLORIDE 5 MG/ML
5 INJECTION, SOLUTION INTRAVENOUS
Status: DISCONTINUED | OUTPATIENT
Start: 2023-08-07 | End: 2023-08-07 | Stop reason: HOSPADM

## 2023-08-07 RX ORDER — HALOPERIDOL 5 MG/ML
1 INJECTION INTRAMUSCULAR
Status: DISCONTINUED | OUTPATIENT
Start: 2023-08-07 | End: 2023-08-07 | Stop reason: HOSPADM

## 2023-08-07 RX ORDER — DEXAMETHASONE SODIUM PHOSPHATE 4 MG/ML
INJECTION, SOLUTION INTRA-ARTICULAR; INTRALESIONAL; INTRAMUSCULAR; INTRAVENOUS; SOFT TISSUE PRN
Status: DISCONTINUED | OUTPATIENT
Start: 2023-08-07 | End: 2023-08-07 | Stop reason: SURG

## 2023-08-07 RX ORDER — HYDRALAZINE HYDROCHLORIDE 20 MG/ML
5 INJECTION INTRAMUSCULAR; INTRAVENOUS
Status: DISCONTINUED | OUTPATIENT
Start: 2023-08-07 | End: 2023-08-07 | Stop reason: HOSPADM

## 2023-08-07 RX ADMIN — CEFAZOLIN 2 G: 1 INJECTION, POWDER, FOR SOLUTION INTRAMUSCULAR; INTRAVENOUS at 15:33

## 2023-08-07 RX ADMIN — OXYCODONE HYDROCHLORIDE 10 MG: 5 SOLUTION ORAL at 17:52

## 2023-08-07 RX ADMIN — PROPOFOL 150 MG: 10 INJECTION, EMULSION INTRAVENOUS at 15:36

## 2023-08-07 RX ADMIN — LIDOCAINE HYDROCHLORIDE 60 MG: 20 INJECTION, SOLUTION EPIDURAL; INFILTRATION; INTRACAUDAL at 15:36

## 2023-08-07 RX ADMIN — SODIUM CHLORIDE, POTASSIUM CHLORIDE, SODIUM LACTATE AND CALCIUM CHLORIDE: 600; 310; 30; 20 INJECTION, SOLUTION INTRAVENOUS at 14:03

## 2023-08-07 RX ADMIN — ACETAMINOPHEN 1000 MG: 500 TABLET, FILM COATED ORAL at 14:03

## 2023-08-07 RX ADMIN — FENTANYL CITRATE 50 MCG: 50 INJECTION, SOLUTION INTRAMUSCULAR; INTRAVENOUS at 15:49

## 2023-08-07 RX ADMIN — DEXAMETHASONE SODIUM PHOSPHATE 6 MG: 4 INJECTION INTRA-ARTICULAR; INTRALESIONAL; INTRAMUSCULAR; INTRAVENOUS; SOFT TISSUE at 15:41

## 2023-08-07 ASSESSMENT — PAIN DESCRIPTION - PAIN TYPE
TYPE: SURGICAL PAIN

## 2023-08-07 ASSESSMENT — FIBROSIS 4 INDEX: FIB4 SCORE: 1.98

## 2023-08-07 NOTE — LETTER
January 5, 2023    Patient Name: Susan Menendez  Surgeon Name: Frandy Carmen M.D.  Surgery Facility: [facility] ([address] [place] [address])  Surgery Date: 3/8/2023    The time of your surgery is not final and may change up to and until the day of your surgery. You will be contacted 24-48 hours prior to your surgery date with your check-in and surgery time.    If you will not be at one of the below numbers please call the surgery scheduler at [phone]  Preferred Phone: [phone]    BEFORE YOUR SURGERY     COVID testing is not required for non-symptomatic vaccinated patients with proof of vaccination at [facility].  For un-vaccinated patients please refer to the following instructions for your COVID testing requirements:    COVID test required 4-7 days prior to surgery, failure to do so can result in a cancellation.    The following locations offer COVID testing:    Approved facilities for COVID testing, if scheduled at [facility]:  · [facility] from 7:30am-3:30pm at [address] [place] [address]  · [facility] [phone] (Please call for an appointment)  · Your local pharmacy    Not scheduled at [facility] contact the scheduled facility for approved testing facilities.    Pre op Appointment:    Instructions: Bring a list of all medications you are taking including the dosing and frequency.    DAY OF YOUR SURGERY  Nothing to eat or drink after midnight     Refrain from smoking any substance after midnight prior to surgery. Smoking may interfere with the anesthetic and frequently produces nausea during the recovery period.    Continue taking all lifesaving medications. Including the morning of your surgery with small sip of water.    Please do NOT take on the day of surgery:  Diuretics: examples- furosemide (Lasix), spironolactone, hydrochlorothiazide  ACE-inhibitors: examples- lisinopril, ramipril, enalapril  “ARBs”: examples-  losartan, Olmesartan, valsartan    Please arrive at the hospital/surgery center at the check-in time provided.     An adult will need to bring you and take you home after your surgery.     AFTER YOUR SURGERY  Post op Appointment:   Date: 03/23/23   Time: 01:00PM   With: Frandy Carmen M.D.   Location: [address] [place] [id number]    TIME OFF WORK  FMLA or Disability forms can be faxed directly to: [phone] or you may drop them off at [address] [place] [address]. Our office charges a $35.00 fee per form. Forms will be completed within 10 business days of drop off and payment received. For the status of your forms you may contact our disability office directly [phone].    MEDICATION INSTRUCTIONS **Please read section completely**    The following medications should be stopped a minimum of 10 days prior to surgery:  All over the counter, Supplements & Herbal medications    Anorectics: Phentermine (Adipex-P, Lomaira and Suprenza), Phentermine-topiramate (Qsymia), Bupropion-naltrexone (Contrave)    Opiod Partial Agonists/Opioid Antagonists: Buprenorphine (Subocone, Belbuca, Butrans, Probuphine Implant, Sublocade), Naltrexone (ReVia, Vivitrol), Naloxone    Amphetamines: Dextroamphetamine/Amphetamine (Adderall, Mydayis), Methylphenidate Hydrochloride (Concerta, Metadate, Methylin, Ritalin)    The following medications should be stopped 5 days prior to surgery:  Blood Thinners: Any Aspirin, Aspirin products, anti-inflammatories such as ibuprofen and any blood thinners such as Coumadin and Plavix. Please consult your prescribing physician if you are on life saving blood thinners, in regards to when to stop medications prior to surgery.     The following medications should be stopped a minimum of 3 days prior to surgery:  PDE-5 inhibitors: Sildenafil (Viagra), Tadalafil (Cialis), Vardenafil (Levitra), Avanafil (Stendra)    MAO Inhibitors: Rasagiline (Azilect), Selegiline (Eldepryl, Emsam,  Selapar), Isocarboxazid (Marplan), Phenelzine (Nardil)

## 2023-08-07 NOTE — OP REPORT
08/07/23       PREOPERATIVE DIAGNOSES:  Bilateral painful bunionette's     POSTOPERATIVE DIAGNOSES:  Same    PROCEDURE PERFORMED:  Bilateral partial excision fifth metatarsal head  Bilateral fifth metatarsophalangeal joint reconstruction     SURGEON:  Frandy Carmen MD     FIRST ASSISTANT: Greg Young MD     SECOND ASSISTANT:  Cheyenne Vazquez CFA     ANESTHESIA:  General endotracheal with local     ESTIMATED BLOOD LOSS:  None.     COMPLICATIONS:  None.    FINDINGS:  See preoperative diagnosis     POSTOPERATIVE PLAN:  Weightbearing as tolerated postop shoes  Follow-up in 2 weeks     INDICATIONS:  The patient is a pleasant 87 y.o. female who has had   problems with the bilateral fifth toes.  Options were discussed   including operative and nonoperative.  The patients elected to undergo operative   intervention.  The above procedure was discussed.  All questions were   answered.  Risks of surgery explained, which included but not limited to   explained wound problems, infection, nerve injury, vascular injury, need for   further surgery.  The patient understands that they could have persistent risk of    pain and need for further surgery.  The patients understands and accepts these risks   and agreed to proceed.  Site was marked by myself prior to receiving   psychotropic medicines.     PROCEDURE IN DETAIL:  The patient was brought to the operating room and    underwent general endotracheal anesthetic without complications.  Bilateral lower   extremity was prepped and draped in standard fashion in supine position with   all appropriate padding.  Positive site verification confirmed the appropriate   extremity as well as above procedure and confirmation that the patient received   preoperative antibiotics.  The leg was elevated and tourniquet was inflated to 250 mmHg.    On the right side a dorsal incision was made brought down to the metatarsophalangeal joint.  Soft tissue release was performed limited in  the contracture.  This allowed for better mobilization of the fifth metatarsal.    Soft tissue was elevated off of the prominence of the fifth metatarsal head.  Microsize saw was used to remove this prominence.  This is smooth to direct palpation as well as over the skin.  The wounds were irrigated with copious irrigation further closed with 3-0 Vicryl 3 nylon.    The static procedure was performed on the left side without deviations complications.    Sterile dressings were applied.  Tourniquet was released.  Patient was transferred to the recovery room in good condition.    Utilization of Dr. Young was necessary for patient positioning needing holding retracting wound closure and dressing placement.  The assistant was present throughout the entire procedure.

## 2023-08-07 NOTE — H&P
Surgery Orthopedic History & Physical Note    Date  8/7/2023    Primary Care Physician  Mya Massey M.D.    CC  * No Diagnosis Codes entered *    HPI  This is a 87 y.o. female who presented with bilateral fifth toe deformity and pain.    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, hands, neck    Autoimmune hepatitis (HCC)     Breath shortness 07/24/2023    occasional    Cataract 07/24/2023    Chronic diarrhea     Chronic UTI     COPD (chronic obstructive pulmonary disease) (HCC)     COVID-19 07/24/2023 5/2021    Depression     Hiatus hernia syndrome     History of cataract surgery     left    Hypertension 07/24/2023    on medication    Hypothyroid     Interstitial cystitis     Other specified disorder of intestines 07/24/2023    past history of constipation    Pain     Pneumonia     30 years ago    Rhinitis     Urinary bladder disorder     Varicose veins        Past Surgical History:   Procedure Laterality Date    PB RECONSTRUC TOE DEFORM,SOFT TISSUE Left 3/16/2022    Procedure: LEFT FOOT LESSER TOES FLEXOR TENDON RELEASE, REPAIRS AS INDICATED;  Surgeon: Frandy Carmen M.D.;  Location: [facility];  Service: Orthopedics    CO TENOTOMY PERC TOE SINGLE TENDON Right 3/16/2022    Procedure: RIGHT FOOT THIRD FLEXOR TENDON RELEASE;  Surgeon: Frandy Carmen M.D.;  Location: [facility];  Service: Orthopedics    PB OSTEOTOMY METATARSAL (NOT 1ST) Right 3/16/2022    Procedure: RIGHT BUNIONETTE CORRECTION, REPAIRS AS INDICATED;  Surgeon: Frandy Carmen M.D.;  Location: [facility];  Service: Orthopedics    FUSION, SPINE, LUMBAR, PLIF N/A 12/3/2018    Procedure: L4-5 TLIF;  Surgeon: Giancarlo Berman M.D.;  Location: SURGERY [facility];  Service: Neurosurgery    DOREEN BY LAPAROSCOPY  2/12/2018    Procedure: DOREEN BY LAPAROSCOPY;  Surgeon: John H Ganser, M.D.;  Location:  "SURGERY [facility];  Service: General    BLADDER BIOPSY WITH CYSTOSCOPY  2012    Performed by JARAD GARCIA at SURGERY [facility]    OTHER      vein stripping both legs    OTHER      \"breast lumps removed\"    OTHER ORTHOPEDIC SURGERY      right knee scope    OTHER ORTHOPEDIC SURGERY      aileen foot surgery       Current Facility-Administered Medications   Medication Dose Route Frequency Provider Last Rate Last Admin    lidocaine (Xylocaine) 1 % injection 0.5 mL  0.5 mL Intradermal Once PRN Frandy Carmen M.D.        lactated ringers infusion   Intravenous Continuous Frandy Carmen M.D. 10 mL/hr at 23 1403 New Bag at 23 1403    ceFAZolin (Ancef) injection 2 g  2 g Intravenous Once Jarett Herrera Ass't           Social History     Socioeconomic History    Marital status:      Spouse name: Not on file    Number of children: Not on file    Years of education: Not on file    Highest education level: Not on file   Occupational History    Not on file   Tobacco Use    Smoking status: Former     Packs/day: 0.50     Years: 2.00     Pack years: 1.00     Types: Cigarettes     Quit date: 1960     Years since quittin.6     Passive exposure: Never    Smokeless tobacco: Never   Vaping Use    Vaping Use: Never used   Substance and Sexual Activity    Alcohol use: Not Currently    Drug use: No    Sexual activity: Not on file   Other Topics Concern    Not on file   Social History Narrative    Not on file     Social Determinants of Health     Financial Resource Strain: Not on file   Food Insecurity: Not on file   Transportation Needs: Not on file   Physical Activity: Not on file   Stress: Not on file   Social Connections: Not on file   Intimate Partner Violence: Not on file   Housing Stability: Not on file       History reviewed. No pertinent family history.    Allergies  Ceftin [cefuroxime axetil], Amoxicillin-pot clavulanate, Levofloxacin, Amoxicillin, Augmentin, Gluten meal, " Keflex, Macrodantin [kdc:red dye+yellow dye+ci pigment blue 63+nitrofurantoin], Morphine, Nitrofurantoin, Pcn [penicillins], Sulfa drugs, and Cephalexin    Review of Systems  Negative    Physical Exam  Bilateral fifth metatarsal head pain and MTPJ deformity.  Cardiovascular and pulmonary exam unremarkable.  Vital Signs  Blood Pressure : 134/63   Temperature: 35.8 °C (96.5 °F)   Pulse: 71       Pulse Oximetry: 99 %       Labs:                    Radiology:  DX-PORTABLE FLUORO > 1 HOUR    (Results Pending)   DX-FOOT-2- LEFT    (Results Pending)   DX-FOOT-2- RIGHT    (Results Pending)         Assessment/Plan:  * No Diagnosis Codes entered *  Procedure(s):  BILATERAL 5TH  METATARSAL HEAD AND METATARSALPHALANGEAL JOINT RECONSTRUCTION  BILATERAL PARTIAL EXCISION 5TH METATARSAL HEAD,

## 2023-08-07 NOTE — LETTER
July 14, 2023    Patient Name: Susan Menendez  Surgeon Name: Frandy Carmen M.D.  Surgery Facility: [facility] ([address] [facility])  Surgery Date: 8/7/2023    The time of your surgery is not final and may change up to and until the day of your surgery. You will be contacted 24-48 hours prior to your surgery date with your check-in and surgery time.    If you will not be at one of the below numbers please call the surgery scheduler at [phone]  Preferred Phone: [phone]    BEFORE YOUR SURGERY   Pre Registration and/or Lab Work must be done within and no earlier than 28 days prior to your surgery date. Please call [facility] at [phone] for an appointment as soon as possible.    The [facility] offers a class for patients undergoing a total hip/knee replacement surgery scheduled at our outpatient surgery center. Information about what to expect for preparation, the day of surgery and recovery will be given. We highly recommend bringing your support for surgery with you to the class, as well as any questions you may have. If you are interested in attending the class, please call [phone] for scheduling.     Pre op Appointment:  Instructions: Bring a list of all medications you are taking including the dosing and frequency.    DAY OF YOUR SURGERY  Nothing to eat or drink after midnight     Refrain from smoking any substance after midnight prior to surgery. Smoking may interfere with the anesthetic and frequently produces nausea during the recovery period.    Continue taking all lifesaving medications. Including the morning of your surgery with small sip of water.    Please do NOT take on the day of surgery:  Diuretics: examples- furosemide (Lasix), spironolactone, hydrochlorothiazide  ACE-inhibitors: examples- lisinopril, ramipril, enalapril  “ARBs”: examples- losartan, Olmesartan, valsartan    Please arrive at the hospital/surgery center at  the check-in time provided.     An adult will need to bring you and take you home after your surgery.     AFTER YOUR SURGERY  Post op Appointment:   Date: 08/22/23   Time: 01:00PM   With: Frandy Carmen MD   Location: [address]  [place] [address]    TIME OFF WORK  FMLA or Disability forms can be faxed directly to: [phone] or you may drop them off at [address] [place] [address]. Our office charges a $35.00 fee per form. Forms will be completed within 10 business days of drop off and payment received. For the status of your forms you may contact our disability office directly [phone].    MEDICATION INSTRUCTIONS **Please read section completely**    The following medications should be stopped a minimum of 10 days prior to surgery:  All over the counter, Supplements & Herbal medications    Anorectics: Phentermine (Adipex-P, Lomaira and Suprenza), Phentermine-topiramate (Qsymia), Bupropion-naltrexone (Contrave)    Opiod Partial Agonists/Opioid Antagonists: Buprenorphine (Subocone, Belbuca, Butrans, Probuphine Implant, Sublocade), Naltrexone (ReVia, Vivitrol), Naloxone    Amphetamines: Dextroamphetamine/Amphetamine (Adderall, Mydayis), Methylphenidate Hydrochloride (Concerta, Metadate, Methylin, Ritalin)    The following medications should be stopped 5 days prior to surgery:  Blood Thinners: Any Aspirin, Aspirin products, anti-inflammatories such as ibuprofen and any blood thinners such as Coumadin and Plavix. Please consult your prescribing physician if you are on life saving blood thinners, in regards to when to stop medications prior to surgery.     The following medications should be stopped a minimum of 3 days prior to surgery:  PDE-5 inhibitors: Sildenafil (Viagra), Tadalafil (Cialis), Vardenafil (Levitra), Avanafil (Stendra)    MAO Inhibitors: Rasagiline (Azilect), Selegiline (Eldepryl, Emsam, Selapar), Isocarboxazid (Marplan), Phenelzine (Nardil)         COVID and INFLUENZA NOTICE  TO PATIENTS    Currently, the [facility] does not routinely test patients for COVID-19 or Influenza prior to their elective surgery.  However, if patients develop the following symptoms prior to their surgery date, they should voluntarily test for COVID-19 and Influenza and notify the surgical office of their condition and results.  The symptoms warranting testing would be any two of the following:    Fever (Temp above 100.4 F)  Chills  Cough  Shortness of breath or difficulty breathing  Fatigue  Myalgias (muscle or body aches)  Headache  Sore Throat  Congestion or Runny Nose  Nausea or vomiting  Diarrhea  New loss of taste or smell    Having these symptoms prior to surgery can significantly increase your risk of morbidity and mortality under anesthesia, which may compromise your health and require a transfer to a hospital for a higher level of care.  Therefore, it is advisable to notify the surgical team of any illness in order to get information for the appropriate time to delay the surgery to minimize these preventable risks.    Your health and safety are our number one priority at the [facility], and we are thankful that you entrust us with your care.  Please help us ensure the best possible surgical and anesthetic outcome by sharing appropriate health information with our perioperative team and staff.  We look forward to taking great care of you!    Thank you for your time and consideration on this matter.    Jayme Elizabeth MD  Medical Director  Anesthesiologist  [facility]

## 2023-08-07 NOTE — ANESTHESIA PROCEDURE NOTES
Airway    Date/Time: 8/7/2023 3:37 PM    Performed by: Sera Sierra M.D.  Authorized by: Sera Sierra M.D.    Location:  OR  Urgency:  Elective  Indications for Airway Management:  Anesthesia      Spontaneous Ventilation: absent    Sedation Level:  Deep  Preoxygenated: Yes    Mask Difficulty Assessment:  0 - not attempted  Final Airway Type:  Supraglottic airway  Final Supraglottic Airway:  Standard LMA    SGA Size:  3  Number of Attempts at Approach:  1

## 2023-08-07 NOTE — DISCHARGE INSTRUCTIONS
HOME CARE INSTRUCTIONS    ACTIVITY: Rest and take it easy for the first 24 hours.  A responsible adult is recommended to remain with you during that time.  It is normal to feel sleepy.  We encourage you to not do anything that requires balance, judgment or coordination.    FOR 24 HOURS DO NOT:  Drive, operate machinery or run household appliances.  Drink beer or alcoholic beverages.  Make important decisions or sign legal documents.    SPECIAL INSTRUCTIONS: Weight Bearing As Tolerated, bilateral feet   Bilateral postop shoe    DIET: To avoid nausea, slowly advance diet as tolerated, avoiding spicy or greasy foods for the first day.  Add more substantial food to your diet according to your physician's instructions. INCREASE FLUIDS AND FIBER TO AVOID CONSTIPATION.    SURGICAL DRESSING/BATHING: keep dressing clean, dry and intact until follow up appointment. Wrap in a plastic bag if showering.     MEDICATIONS: Resume taking daily medication.  Take prescribed pain medication with food.  If no medication is prescribed, you may take non-aspirin pain medication if needed.  PAIN MEDICATION CAN BE VERY CONSTIPATING.  Take a stool softener or laxative such as senokot, pericolace, or milk of magnesia if needed.    Prescription given for oxycodone, vistaril and tylenol.  Last pain medication given 1000 mg Tylenol at 2:03pm.    A follow-up appointment should be arranged with your doctor; call to schedule.    You should CALL YOUR PHYSICIAN if you develop:  Fever greater than 101 degrees F.  Pain not relieved by medication, or persistent nausea or vomiting.  Excessive bleeding (blood soaking through dressing) or unexpected drainage from the wound.  Extreme redness or swelling around the incision site, drainage of pus or foul smelling drainage.  Inability to urinate or empty your bladder within 8 hours.  Problems with breathing or chest pain.    You should call 911 if you develop problems with breathing or chest pain.  If you are  unable to contact your doctor or surgical center, you should go to the nearest emergency room or urgent care center.  Physician's telephone #: [phone]    MILD FLU-LIKE SYMPTOMS ARE NORMAL.  YOU MAY EXPERIENCE GENERALIZED MUSCLE ACHES, THROAT IRRITATION, HEADACHE AND/OR SOME NAUSEA.    If any questions arise, call your doctor.  If your doctor is not available, please feel free to call the Surgical Center at [phone].  The Center is open Monday through Friday from 7AM to 7PM.      A registered nurse may call you a few days after your surgery to see how you are doing after your procedure.    You may also receive a survey in the mail within the next two weeks and we ask that you take a few moments to complete the survey and return it to us.  Our goal is to provide you with very good care and we value your comments.     Depression / Suicide Risk    As you are discharged from this [facility] facility, it is important to learn how to keep safe from harming yourself.    Recognize the warning signs:  Abrupt changes in personality, positive or negative- including increase in energy   Giving away possessions  Change in eating patterns- significant weight changes-  positive or negative  Change in sleeping patterns- unable to sleep or sleeping all the time   Unwillingness or inability to communicate  Depression  Unusual sadness, discouragement and loneliness  Talk of wanting to die  Neglect of personal appearance   Rebelliousness- reckless behavior  Withdrawal from people/activities they love  Confusion- inability to concentrate     If you or a loved one observes any of these behaviors or has concerns about self-harm, here's what you can do:  Talk about it- your feelings and reasons for harming yourself  Remove any means that you might use to hurt yourself (examples: pills, rope, extension cords, firearm)  Get professional help from the community (Mental Health, Substance Abuse, psychological counseling)  Do not be  alone:Call your Safe Contact- someone whom you trust who will be there for you.  Call your local CRISIS HOTLINE 966-1130 or 665-168-0769  Call your local Children's Mobile Crisis Response Team [place] (648) 323-5799 or www.[employer]  Call the toll free National Suicide Prevention Hotlines   National Suicide Prevention Lifeline 656-091-UCVQ (8410)  [facility] 800-LTQOJWK (331-3992)    I acknowledge receipt and understanding of these Home Care instructions.

## 2023-08-07 NOTE — ANESTHESIA PREPROCEDURE EVALUATION
Case: [id number] Date/Time: 08/07/23 1430    Procedures:       BILATERAL 5TH  METATARSAL HEAD AND METATARSALPHALANGEAL JOINT RECONSTRUCTION (Bilateral: Toe)      BILATERAL PARTIAL EXCISION 5TH METATARSAL HEAD, (Bilateral: Foot)    Anesthesia type: General    Diagnosis:       Pain in right foot [M79.671]      Pain in right ankle [M25.571]    Pre-op diagnosis: Pain in right foot [M79.671], Pain in right ankle [M25.571]    Location: [place] [address] / SURGERY [facility]    Surgeons: Frandy Carmen M.D.          Relevant Problems   CARDIAC   (positive) Varicose veins      GI   (positive) Gastro-esophageal reflux disease without esophagitis         (positive) Autoimmune hepatitis (HCC)      ENDO   (positive) Hypothyroidism      Other   (positive) Arthritis       Physical Exam    Airway   Mallampati: I  TM distance: >3 FB  Neck ROM: full       Cardiovascular - normal exam     Dental - normal exam           Pulmonary   Breath sounds clear to auscultation     Abdominal    Neurological - normal exam                 Anesthesia Plan    ASA 2       Plan - general       Airway plan will be LMA          Induction: intravenous    Postoperative Plan: Postoperative administration of opioids is intended.    Pertinent diagnostic labs and testing reviewed    Informed Consent:    Anesthetic plan and risks discussed with patient.

## 2023-08-08 ASSESSMENT — PAIN SCALES - GENERAL: PAIN_LEVEL: 0

## 2023-08-08 NOTE — ANESTHESIA POSTPROCEDURE EVALUATION
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 Responsible Provider: Sera Sierra M.D.    Anesthesia Type: general ASA Status: 2          Final Anesthesia Type: general  Last vitals  BP   Blood Pressure : (!) 166/75    Temp   36.4 °C (97.6 °F)    Pulse   69   Resp   16    SpO2   95 %      Anesthesia Post Evaluation    Patient location during evaluation: PACU  Patient participation: complete - patient participated  Level of consciousness: awake and alert  Pain score: 0    Airway patency: patent  Anesthetic complications: no  Cardiovascular status: adequate  Respiratory status: acceptable  Hydration status: acceptable    PONV: none          No notable events documented.     Nurse Pain Score: 0 (NPRS)

## 2023-08-08 NOTE — ANESTHESIA TIME REPORT
Anesthesia Start and Stop Event Times     Date Time Event    8/7/2023 1531 Ready for Procedure     1533 Anesthesia Start     1628 Anesthesia Stop        Responsible Staff  08/07/23    Name Role Begin End    Sera Sierra M.D. Anesth 1533 1628        Overtime Reason:  no overtime (within assigned shift)    Comments:

## 2023-08-08 NOTE — OR NURSING
Patient arrived in PACU, VSS. Bilateral feet wrapped in soft roll and ace wrap. Dressing is CDI. Good CMS.   Ordered bilateral postop shoe.  Report called to TERRANCE Freitas. Transported to phase 2 room # 32  on RA.

## 2023-09-04 ENCOUNTER — OUTPATIENT INFUSION SERVICES (OUTPATIENT)
Dept: ONCOLOGY | Facility: MEDICAL CENTER | Age: 88
End: 2023-09-04
Attending: INTERNAL MEDICINE
Payer: MEDICARE

## 2023-09-04 VITALS
TEMPERATURE: 98 F | WEIGHT: 143.08 LBS | HEART RATE: 72 BPM | HEIGHT: 65 IN | BODY MASS INDEX: 23.84 KG/M2 | SYSTOLIC BLOOD PRESSURE: 147 MMHG | OXYGEN SATURATION: 98 % | RESPIRATION RATE: 18 BRPM | DIASTOLIC BLOOD PRESSURE: 94 MMHG

## 2023-09-04 ASSESSMENT — FIBROSIS 4 INDEX: FIB4 SCORE: 1.98

## 2023-09-04 NOTE — PROGRESS NOTES
Pt arrived ambulatory to IS for Prolia. POC discussed. Pt report crown placement 3 weeks ago. Per pharmacy recommendation, pt should return next week for injection. Pt verbalized understanding. Rescheduled for next week. Appointment confirmed. Pt discharged from IS in NAD under self care.

## 2023-09-15 ENCOUNTER — OUTPATIENT INFUSION SERVICES (OUTPATIENT)
Dept: ONCOLOGY | Facility: MEDICAL CENTER | Age: 88
End: 2023-09-15
Attending: INTERNAL MEDICINE
Payer: MEDICARE

## 2023-09-15 VITALS
SYSTOLIC BLOOD PRESSURE: 165 MMHG | OXYGEN SATURATION: 97 % | HEART RATE: 77 BPM | TEMPERATURE: 97.1 F | WEIGHT: 143.3 LBS | BODY MASS INDEX: 23.88 KG/M2 | HEIGHT: 65 IN | DIASTOLIC BLOOD PRESSURE: 76 MMHG | RESPIRATION RATE: 16 BRPM

## 2023-09-15 DIAGNOSIS — M81.0 SENILE OSTEOPOROSIS: ICD-10-CM

## 2023-09-15 LAB
CA-I BLD ISE-SCNC: 1.24 MMOL/L (ref 1.1–1.3)
CREAT BLD-MCNC: 0.9 MG/DL (ref 0.5–1.4)

## 2023-09-15 PROCEDURE — 96372 THER/PROPH/DIAG INJ SC/IM: CPT

## 2023-09-15 PROCEDURE — 82330 ASSAY OF CALCIUM: CPT

## 2023-09-15 PROCEDURE — 82565 ASSAY OF CREATININE: CPT

## 2023-09-15 PROCEDURE — 700111 HCHG RX REV CODE 636 W/ 250 OVERRIDE (IP): Mod: JZ,JG | Performed by: INTERNAL MEDICINE

## 2023-09-15 PROCEDURE — 36415 COLL VENOUS BLD VENIPUNCTURE: CPT

## 2023-09-15 RX ORDER — EPINEPHRINE 1 MG/ML(1)
0.5 AMPUL (ML) INJECTION PRN
OUTPATIENT
Start: 2024-03-13

## 2023-09-15 RX ORDER — DIPHENHYDRAMINE HYDROCHLORIDE 50 MG/ML
50 INJECTION INTRAMUSCULAR; INTRAVENOUS PRN
OUTPATIENT
Start: 2024-03-13

## 2023-09-15 RX ORDER — METHYLPREDNISOLONE SODIUM SUCCINATE 125 MG/2ML
125 INJECTION, POWDER, LYOPHILIZED, FOR SOLUTION INTRAMUSCULAR; INTRAVENOUS PRN
OUTPATIENT
Start: 2024-03-13

## 2023-09-15 RX ADMIN — DENOSUMAB 60 MG: 60 INJECTION SUBCUTANEOUS at 16:15

## 2023-09-15 ASSESSMENT — FIBROSIS 4 INDEX: FIB4 SCORE: 2

## 2023-09-15 NOTE — PROGRESS NOTES
Ms Menendez is here today for Prolia injection.    Pt  denies any loose or broken teeth, no jaw pain and no invasive procedures. Denies any s/s of hypocalcemia.   Reports taking calcium and vitamin D as prescribed. No issues with the crown replacement from last month.    Venipuncture to the left Ac . Istat labs are within parameters for treatment today.    Prolia given SQ to the back of her left arm and was tolerated well.    Pt was discharged in stable condition.

## 2023-10-02 NOTE — HPI: DRY SKIN
POST-OPERATIVE INSTRUCTIONS FOR TOOTH EXTRACTIONS  Extraction wounds usually heal quickly and without complications. A blood clot must form in the tooth socket for healing to occur. It is therefore important to avoid activities which would disturb the clot.  NO STRAWS: Do not allow your child to drink through a straw, rinse/swish vigorously, drink carbonated beverages, or smoke for the rest of the day. It is important not to create suction in the mouth,   WARM SALT WATER: After the first day, your child may very gently rinse with warm salt water (1/2 tsp. salt in a glass of warm water). This keeps food particles out of the healing tooth socket.   BRUSHING: Brush your child’s teeth gently following the extractions to keep the teeth clean, avoid bad breath, and prevent infection of the extraction wound. Do not stick anything into the socket.   FOOD/DRINK: Have your child eat soft, nutritious foods to stay well-hydrated and help the healing process (popsicles, gelatin, eggs, pasta, soup, mashed potatoes). Avoid hot or spicy foods and drinks or sharp foods (such as chips) as this may increase bleeding from the tooth socket.  BLEEDING: The extraction site may bleed for a couple of hours and even a day later may ooze a little. To help control bleeding, fold a piece of gauze into a pad and place it directly over the bleeding area. Have your child bite firmly onto the gauze for approximately 30 minutes. If heavy bleeding persists, call your child’s dentist.   MEDICATION: To minimize discomfort, please give your child pain medication before   the local anesthetic (numbness) wears off. Pain medication should not be used for more than three days. Children’s ibuprofen (anti-inflammatory) is preferred.   Children’s ibuprofen: every 6-8 hours (with food)   Children’s acetaminophen (Tylenol™): every 4-6 hours  SWELLING: mild swelling may be relieved by placing a cold pack on the outside of the face on 20 minutes, off 20 
minutes for the first six hours after extraction.    Your child’s dentist: Caryn Mccormack Dr.                             Tooth # extracted: A        Date: 10/02/23    Dental Clinic: [phone]                       Dental resident on call (after4:30pm): [phone]    
How Many Showers Or Baths Do You Take In One Day?: 0
Additional History: Using cerave cream and cerave cleansers

## 2023-11-29 ENCOUNTER — PATIENT MESSAGE (OUTPATIENT)
Dept: HEALTH INFORMATION MANAGEMENT | Facility: OTHER | Age: 88
End: 2023-11-29

## 2023-12-12 ENCOUNTER — HOSPITAL ENCOUNTER (OUTPATIENT)
Dept: LAB | Facility: MEDICAL CENTER | Age: 88
End: 2023-12-12
Attending: NURSE PRACTITIONER
Payer: MEDICARE

## 2023-12-12 PROCEDURE — 82670 ASSAY OF TOTAL ESTRADIOL: CPT

## 2023-12-12 PROCEDURE — 84402 ASSAY OF FREE TESTOSTERONE: CPT

## 2023-12-12 PROCEDURE — 84403 ASSAY OF TOTAL TESTOSTERONE: CPT

## 2023-12-12 PROCEDURE — 84270 ASSAY OF SEX HORMONE GLOBUL: CPT

## 2023-12-12 PROCEDURE — 36415 COLL VENOUS BLD VENIPUNCTURE: CPT

## 2023-12-13 LAB — ESTRADIOL SERPL-MCNC: 10.3 PG/ML

## 2023-12-17 LAB
SHBG SERPL-SCNC: 83 NMOL/L (ref 17–125)
TESTOST FREE SERPL-MCNC: 19.3 PG/ML (ref 0.6–3.8)
TESTOST SERPL-MCNC: 204 NG/DL (ref 5–32)

## 2024-02-08 ENCOUNTER — HOSPITAL ENCOUNTER (OUTPATIENT)
Dept: LAB | Facility: MEDICAL CENTER | Age: 89
End: 2024-02-08
Attending: INTERNAL MEDICINE
Payer: MEDICARE

## 2024-02-08 DIAGNOSIS — M15.9 PRIMARY OSTEOARTHRITIS INVOLVING MULTIPLE JOINTS: ICD-10-CM

## 2024-02-08 DIAGNOSIS — Z79.1 NSAID LONG-TERM USE: ICD-10-CM

## 2024-02-08 DIAGNOSIS — M81.0 SENILE OSTEOPOROSIS: ICD-10-CM

## 2024-02-08 LAB
ALBUMIN SERPL BCP-MCNC: 4.2 G/DL (ref 3.2–4.9)
ALBUMIN/GLOB SERPL: 1.6 G/DL
ALP SERPL-CCNC: 49 U/L (ref 30–99)
ALT SERPL-CCNC: 14 U/L (ref 2–50)
ANION GAP SERPL CALC-SCNC: 11 MMOL/L (ref 7–16)
AST SERPL-CCNC: 22 U/L (ref 12–45)
BASOPHILS # BLD AUTO: 1.7 % (ref 0–1.8)
BASOPHILS # BLD: 0.07 K/UL (ref 0–0.12)
BILIRUB SERPL-MCNC: 0.5 MG/DL (ref 0.1–1.5)
BUN SERPL-MCNC: 22 MG/DL (ref 8–22)
CALCIUM ALBUM COR SERPL-MCNC: 9.6 MG/DL (ref 8.5–10.5)
CALCIUM SERPL-MCNC: 9.8 MG/DL (ref 8.5–10.5)
CHLORIDE SERPL-SCNC: 105 MMOL/L (ref 96–112)
CO2 SERPL-SCNC: 22 MMOL/L (ref 20–33)
CREAT SERPL-MCNC: 0.87 MG/DL (ref 0.5–1.4)
EOSINOPHIL # BLD AUTO: 0.12 K/UL (ref 0–0.51)
EOSINOPHIL NFR BLD: 2.9 % (ref 0–6.9)
ERYTHROCYTE [DISTWIDTH] IN BLOOD BY AUTOMATED COUNT: 50.4 FL (ref 35.9–50)
ERYTHROCYTE [SEDIMENTATION RATE] IN BLOOD BY WESTERGREN METHOD: 5 MM/HOUR (ref 0–25)
GFR SERPLBLD CREATININE-BSD FMLA CKD-EPI: 65 ML/MIN/1.73 M 2
GLOBULIN SER CALC-MCNC: 2.7 G/DL (ref 1.9–3.5)
GLUCOSE SERPL-MCNC: 86 MG/DL (ref 65–99)
HCT VFR BLD AUTO: 42.1 % (ref 37–47)
HGB BLD-MCNC: 13.9 G/DL (ref 12–16)
IMM GRANULOCYTES # BLD AUTO: 0.01 K/UL (ref 0–0.11)
IMM GRANULOCYTES NFR BLD AUTO: 0.2 % (ref 0–0.9)
LYMPHOCYTES # BLD AUTO: 1.47 K/UL (ref 1–4.8)
LYMPHOCYTES NFR BLD: 35.3 % (ref 22–41)
MCH RBC QN AUTO: 31.4 PG (ref 27–33)
MCHC RBC AUTO-ENTMCNC: 33 G/DL (ref 32.2–35.5)
MCV RBC AUTO: 95 FL (ref 81.4–97.8)
MONOCYTES # BLD AUTO: 0.35 K/UL (ref 0–0.85)
MONOCYTES NFR BLD AUTO: 8.4 % (ref 0–13.4)
NEUTROPHILS # BLD AUTO: 2.14 K/UL (ref 1.82–7.42)
NEUTROPHILS NFR BLD: 51.5 % (ref 44–72)
NRBC # BLD AUTO: 0 K/UL
NRBC BLD-RTO: 0 /100 WBC (ref 0–0.2)
PLATELET # BLD AUTO: 283 K/UL (ref 164–446)
PMV BLD AUTO: 10.7 FL (ref 9–12.9)
POTASSIUM SERPL-SCNC: 4.4 MMOL/L (ref 3.6–5.5)
PROT SERPL-MCNC: 6.9 G/DL (ref 6–8.2)
RBC # BLD AUTO: 4.43 M/UL (ref 4.2–5.4)
SODIUM SERPL-SCNC: 138 MMOL/L (ref 135–145)
WBC # BLD AUTO: 4.2 K/UL (ref 4.8–10.8)

## 2024-02-08 PROCEDURE — 80053 COMPREHEN METABOLIC PANEL: CPT

## 2024-02-08 PROCEDURE — 85652 RBC SED RATE AUTOMATED: CPT

## 2024-02-08 PROCEDURE — 85025 COMPLETE CBC W/AUTO DIFF WBC: CPT

## 2024-02-08 PROCEDURE — 36415 COLL VENOUS BLD VENIPUNCTURE: CPT

## 2024-03-07 ENCOUNTER — HOSPITAL ENCOUNTER (OUTPATIENT)
Dept: LAB | Facility: MEDICAL CENTER | Age: 89
End: 2024-03-07
Payer: MEDICARE

## 2024-03-07 PROCEDURE — 86364 TISS TRNSGLTMNASE EA IG CLAS: CPT | Mod: 91

## 2024-03-07 PROCEDURE — 36415 COLL VENOUS BLD VENIPUNCTURE: CPT

## 2024-03-07 PROCEDURE — 86258 DGP ANTIBODY EACH IG CLASS: CPT | Mod: 91

## 2024-03-09 LAB
GLIADIN IGA SER IA-ACNC: <0.72 FLU (ref 0–4.99)
GLIADIN IGG SER IA-ACNC: <0.56 FLU (ref 0–4.99)
TTG IGA SER IA-ACNC: <1.02 FLU (ref 0–4.99)

## 2024-03-10 LAB — TTG IGG SER IA-ACNC: <0.82 FLU (ref 0–4.99)

## 2024-03-29 ENCOUNTER — OUTPATIENT INFUSION SERVICES (OUTPATIENT)
Dept: ONCOLOGY | Facility: MEDICAL CENTER | Age: 89
End: 2024-03-29
Attending: INTERNAL MEDICINE
Payer: MEDICARE

## 2024-03-29 VITALS
TEMPERATURE: 97.5 F | OXYGEN SATURATION: 92 % | RESPIRATION RATE: 18 BRPM | HEART RATE: 83 BPM | DIASTOLIC BLOOD PRESSURE: 75 MMHG | WEIGHT: 136.47 LBS | HEIGHT: 66 IN | SYSTOLIC BLOOD PRESSURE: 102 MMHG | BODY MASS INDEX: 21.93 KG/M2

## 2024-03-29 DIAGNOSIS — M81.0 SENILE OSTEOPOROSIS: ICD-10-CM

## 2024-03-29 LAB
CA-I BLD ISE-SCNC: 1.23 MMOL/L (ref 1.1–1.3)
CREAT BLD-MCNC: 0.9 MG/DL (ref 0.5–1.4)

## 2024-03-29 PROCEDURE — 82565 ASSAY OF CREATININE: CPT

## 2024-03-29 PROCEDURE — 700111 HCHG RX REV CODE 636 W/ 250 OVERRIDE (IP): Mod: JZ,JG | Performed by: INTERNAL MEDICINE

## 2024-03-29 PROCEDURE — 36415 COLL VENOUS BLD VENIPUNCTURE: CPT

## 2024-03-29 PROCEDURE — 82330 ASSAY OF CALCIUM: CPT

## 2024-03-29 PROCEDURE — 96372 THER/PROPH/DIAG INJ SC/IM: CPT

## 2024-03-29 RX ORDER — DIPHENHYDRAMINE HYDROCHLORIDE 50 MG/ML
50 INJECTION INTRAMUSCULAR; INTRAVENOUS PRN
OUTPATIENT
Start: 2024-09-09

## 2024-03-29 RX ORDER — METHYLPREDNISOLONE SODIUM SUCCINATE 125 MG/2ML
125 INJECTION, POWDER, LYOPHILIZED, FOR SOLUTION INTRAMUSCULAR; INTRAVENOUS PRN
OUTPATIENT
Start: 2024-09-09

## 2024-03-29 RX ORDER — EPINEPHRINE 1 MG/ML(1)
0.5 AMPUL (ML) INJECTION PRN
OUTPATIENT
Start: 2024-09-09

## 2024-03-29 RX ADMIN — DENOSUMAB 60 MG: 60 INJECTION SUBCUTANEOUS at 14:23

## 2024-03-29 ASSESSMENT — FIBROSIS 4 INDEX: FIB4 SCORE: 1.79

## 2024-03-29 NOTE — PROGRESS NOTES
Patient came into INF independently. Orders and vitals signs reviewed, assessment done. Patient confirmed taking calcium and vitamin D supplements. Patient reports no signs or symptoms of hypocalcemia. Patient has not had oral surgery in past four weeks and does not have anything planned for future. Labs collected peripherally and reviewed. Patient meets parameters for injection. Prolia injected in back of right arm as ordered with no adverse events. Site covered with band aid. Patient left in stable condition. Scheduling emailed for next appointment.

## 2024-04-01 ENCOUNTER — HOSPITAL ENCOUNTER (OUTPATIENT)
Dept: RADIOLOGY | Facility: MEDICAL CENTER | Age: 89
End: 2024-04-01
Payer: MEDICARE

## 2024-04-01 DIAGNOSIS — R10.9 ABDOMINAL PAIN, UNSPECIFIED ABDOMINAL LOCATION: ICD-10-CM

## 2024-04-01 DIAGNOSIS — R10.2 PELVIC PAIN: ICD-10-CM

## 2024-04-01 PROCEDURE — 74177 CT ABD & PELVIS W/CONTRAST: CPT

## 2024-04-01 PROCEDURE — 700117 HCHG RX CONTRAST REV CODE 255

## 2024-04-01 RX ADMIN — IOHEXOL 25 ML: 240 INJECTION, SOLUTION INTRATHECAL; INTRAVASCULAR; INTRAVENOUS; ORAL at 09:22

## 2024-04-01 RX ADMIN — IOHEXOL 100 ML: 350 INJECTION, SOLUTION INTRAVENOUS at 09:37

## 2024-04-16 ENCOUNTER — HOSPITAL ENCOUNTER (OUTPATIENT)
Dept: LAB | Facility: MEDICAL CENTER | Age: 89
End: 2024-04-16
Attending: NURSE PRACTITIONER
Payer: MEDICARE

## 2024-04-16 PROCEDURE — 84270 ASSAY OF SEX HORMONE GLOBUL: CPT

## 2024-04-16 PROCEDURE — 36415 COLL VENOUS BLD VENIPUNCTURE: CPT

## 2024-04-16 PROCEDURE — 84402 ASSAY OF FREE TESTOSTERONE: CPT

## 2024-04-16 PROCEDURE — 84403 ASSAY OF TOTAL TESTOSTERONE: CPT

## 2024-04-20 LAB
SHBG SERPL-SCNC: 81 NMOL/L (ref 17–125)
TESTOST FREE SERPL-MCNC: 0.7 PG/ML (ref 0.6–3.8)
TESTOST SERPL-MCNC: 8 NG/DL (ref 5–32)

## 2024-05-02 ENCOUNTER — HOSPITAL ENCOUNTER (OUTPATIENT)
Facility: MEDICAL CENTER | Age: 89
End: 2024-05-02
Payer: MEDICARE

## 2024-05-05 LAB
CALPROTECTIN STL-MCNT: 173 UG/G
ELASTASE PANC STL-MCNT: 91 UG/G

## 2024-05-06 ENCOUNTER — HOSPITAL ENCOUNTER (OUTPATIENT)
Facility: MEDICAL CENTER | Age: 89
End: 2024-05-06
Payer: MEDICARE

## 2024-05-06 LAB — OVA AND PARASITE, FECAL INTERPRETATION Q0595: NEGATIVE

## 2024-05-08 ENCOUNTER — OFFICE VISIT (OUTPATIENT)
Dept: URGENT CARE | Facility: PHYSICIAN GROUP | Age: 89
End: 2024-05-08
Payer: MEDICARE

## 2024-05-08 VITALS
HEART RATE: 72 BPM | TEMPERATURE: 98.7 F | OXYGEN SATURATION: 97 % | SYSTOLIC BLOOD PRESSURE: 142 MMHG | RESPIRATION RATE: 18 BRPM | WEIGHT: 138.67 LBS | BODY MASS INDEX: 23.1 KG/M2 | HEIGHT: 65 IN | DIASTOLIC BLOOD PRESSURE: 72 MMHG

## 2024-05-08 DIAGNOSIS — N30.01 ACUTE CYSTITIS WITH HEMATURIA: Primary | ICD-10-CM

## 2024-05-08 LAB
APPEARANCE UR: NORMAL
BILIRUB UR STRIP-MCNC: NORMAL MG/DL
COLOR UR AUTO: YELLOW
GLUCOSE UR STRIP.AUTO-MCNC: NORMAL MG/DL
KETONES UR STRIP.AUTO-MCNC: NORMAL MG/DL
LEUKOCYTE ESTERASE UR QL STRIP.AUTO: NORMAL
NITRITE UR QL STRIP.AUTO: POSITIVE
PH UR STRIP.AUTO: 7 [PH] (ref 5–8)
PROT UR QL STRIP: NEGATIVE MG/DL
RBC UR QL AUTO: NORMAL
SP GR UR STRIP.AUTO: 1.01
UROBILINOGEN UR STRIP-MCNC: 0.2 MG/DL

## 2024-05-08 PROCEDURE — 99213 OFFICE O/P EST LOW 20 MIN: CPT | Performed by: PHYSICIAN ASSISTANT

## 2024-05-08 PROCEDURE — 3078F DIAST BP <80 MM HG: CPT | Performed by: PHYSICIAN ASSISTANT

## 2024-05-08 PROCEDURE — 81002 URINALYSIS NONAUTO W/O SCOPE: CPT | Performed by: PHYSICIAN ASSISTANT

## 2024-05-08 PROCEDURE — 3077F SYST BP >= 140 MM HG: CPT | Performed by: PHYSICIAN ASSISTANT

## 2024-05-08 RX ORDER — CIPROFLOXACIN 250 MG/1
250 TABLET, FILM COATED ORAL 2 TIMES DAILY
Qty: 6 TABLET | Refills: 0 | Status: SHIPPED | OUTPATIENT
Start: 2024-05-08 | End: 2024-05-11

## 2024-05-08 ASSESSMENT — ENCOUNTER SYMPTOMS
VOMITING: 0
CHILLS: 0
FEVER: 0
NAUSEA: 0
ABDOMINAL PAIN: 0

## 2024-05-08 ASSESSMENT — FIBROSIS 4 INDEX: FIB4 SCORE: 1.79

## 2024-05-09 NOTE — PROGRESS NOTES
"Subjective     Jessie Menendez is a 88 y.o. female who presents with UTI (Symptoms started 3 days ago, \"Having uti symptoms\"-pt states )    HPI:  Susan Menendez is a 88 y.o. female who presents today for evaluation of a urinary tract infection.  Patient reports that she started to notice some painful urination 3 days ago.  She does note that she has a history of recurrent UTIs but she also has a history of interstitial cystitis.  She says that as the last few days have progressed, however, she has had some mild worsening in her symptoms and thinks that her symptoms are more consistent with UTI.  She has also had some urgency and frequency.  No fever/chills or nausea/vomiting.        Review of Systems   Constitutional:  Negative for chills and fever.   Gastrointestinal:  Negative for abdominal pain, nausea and vomiting.   Genitourinary:  Positive for dysuria, frequency and urgency.         PMH:  has a past medical history of Anemia, Anesthesia, Arthritis (02/06/2018), Autoimmune hepatitis ([facility]), Breath shortness (07/24/2023), Cataract (07/24/2023), Chronic diarrhea, Chronic UTI, COPD (chronic obstructive pulmonary disease) ([facility]), COVID-19 (07/24/2023), Depression, Hiatus hernia syndrome, History of cataract surgery, Hypertension (07/24/2023), Hypothyroid, Interstitial cystitis, Other specified disorder of intestines (07/24/2023), Pain, Pneumonia, Rhinitis, Urinary bladder disorder, and Varicose veins.  MEDS:   Current Outpatient Medications:     ciprofloxacin (CIPRO) 250 MG Tab, Take 1 Tablet by mouth 2 times a day for 3 days., Disp: 6 Tablet, Rfl: 0    acetaminophen (TYLENOL) 500 MG Tab, Take 2 tabs up to 3 times a day prn pain, Disp: 60 Tablet, Rfl: 0    polyethylene glycol 3350 (MIRALAX) 17 GM/SCOOP Powder, Take 17 g by mouth every day., Disp: , Rfl:     levothyroxine (SYNTHROID) 150 MCG Tab, Take 150 mcg by mouth every day., Disp: , Rfl:     losartan (COZAAR) 25 MG Tab, Take 12.5 mg by mouth 1 time a " day as needed (as needed based on blood pressure 12.5 mg if bp is over 145)., Disp: , Rfl:     hydrOXYzine pamoate (VISTARIL) 50 MG Cap, Take 1 Capsule by mouth 3 times a day as needed for Itching or Other (nausea, anxiety, insomnia)., Disp: 20 Capsule, Rfl: 1    Wheat Dextrin (BENEFIBER) Powder, Take 1 Dose by mouth every day. 2 TEASPOONS (STRENGTH UNKNOWN) (Patient not taking: Reported on 5/8/2024), Disp: , Rfl:     tamsulosin (FLOMAX) 0.4 MG capsule, Take 0.4 mg by mouth every evening. (Patient not taking: Reported on 5/8/2024), Disp: , Rfl:     Cholecalciferol (VITAMIN D) 2000 units Cap, Take 1 Capsule by mouth every day., Disp: , Rfl:   ALLERGIES:   Allergies   Allergen Reactions    Ceftin [Cefuroxime Axetil] Shortness of Breath     weaness     Amoxicillin-Pot Clavulanate Nausea    Levofloxacin      Other reaction(s): stomach pain, N/V    Amoxicillin      Unknown reaction    Augmentin Nausea     Stomach ache      Gluten Meal Unspecified     Weak and tongue breaks out     Keflex      rash    Macrodantin [Kdc:Red Dye+Yellow Dye+Ci Pigment Blue 63+Nitrofurantoin]     Morphine Nausea     extreme    Nitrofurantoin Nausea    Pcn [Penicillins] Shortness of Breath    Sulfa Drugs      Unknown rxn    Cephalexin Rash     SURGHX:   Past Surgical History:   Procedure Laterality Date    PB RECONSTRUC TOE DEFORM,SOFT TISSUE Bilateral 8/7/2023    Procedure: BILATERAL 5TH  METATARSALPHALANGEAL JOINT RECONSTRUCTION;  Surgeon: Frandy Carmen M.D.;  Location: SURGERY [facility];  Service: Orthopedics    METATARSAL HEAD RESECTION Bilateral 8/7/2023    Procedure: BILATERAL PARTIAL EXCISION 5TH METATARSAL HEAD,;  Surgeon: Frandy Carmen M.D.;  Location: [facility];  Service: Orthopedics    PB RECONSTRUC TOE DEFORM,SOFT TISSUE Left 3/16/2022    Procedure: LEFT FOOT LESSER TOES FLEXOR TENDON RELEASE, REPAIRS AS INDICATED;  Surgeon: Frandy Carmen M.D.;  Location: [facility] [place];  Service: Orthopedics     "CT TENOTOMY PERC TOE SINGLE TENDON Right 3/16/2022    Procedure: RIGHT FOOT THIRD FLEXOR TENDON RELEASE;  Surgeon: Frandy Carmen M.D.;  Location: [facility];  Service: Orthopedics    PB OSTEOTOMY METATARSAL (NOT 1ST) Right 3/16/2022    Procedure: RIGHT BUNIONETTE CORRECTION, REPAIRS AS INDICATED;  Surgeon: Frandy Carmen M.D.;  Location: [facility];  Service: Orthopedics    FUSION, SPINE, LUMBAR, PLIF N/A 12/3/2018    Procedure: L4-5 TLIF;  Surgeon: Giancarlo Berman M.D.;  Location: SURGERY [facility];  Service: Neurosurgery    DOREEN BY LAPAROSCOPY  2/12/2018    Procedure: DOREEN BY LAPAROSCOPY;  Surgeon: John H Ganser, M.D.;  Location: SURGERY [facility];  Service: General    BLADDER BIOPSY WITH CYSTOSCOPY  6/12/2012    Performed by JARAD GARCIA at SURGERY [facility]    OTHER      vein stripping both legs    OTHER      \"breast lumps removed\"    OTHER ORTHOPEDIC SURGERY      right knee scope    OTHER ORTHOPEDIC SURGERY      aileen foot surgery     SOCHX:  reports that she quit smoking about 64 years ago. Her smoking use included cigarettes. She started smoking about 66 years ago. She has a 1 pack-year smoking history. She has never been exposed to tobacco smoke. She has never used smokeless tobacco. She reports that she does not currently use alcohol. She reports that she does not use drugs.  FH: Family history was reviewed, no pertinent findings to report      Objective     BP (!) 142/72 (BP Location: Left arm, Patient Position: Sitting, BP Cuff Size: Adult)   Pulse 72   Temp 37.1 °C (98.7 °F) (Temporal)   Resp 18   Ht 1.651 m (5' 5\")   Wt 62.9 kg (138 lb 10.7 oz)   SpO2 97%   BMI 23.08 kg/m²      Physical Exam  Constitutional:       General: She is not in acute distress.     Appearance: She is not diaphoretic.   HENT:      Head: Normocephalic and atraumatic.      Right Ear: External ear normal.      Left Ear: External ear normal.   Eyes: "      Conjunctiva/sclera: Conjunctivae normal.      Pupils: Pupils are equal, round, and reactive to light.   Pulmonary:      Effort: Pulmonary effort is normal. No respiratory distress.   Abdominal:      Tenderness: There is abdominal tenderness. There is no right CVA tenderness or left CVA tenderness.   Musculoskeletal:      Cervical back: Normal range of motion.   Skin:     Findings: No rash.   Neurological:      Mental Status: She is alert and oriented to person, place, and time.   Psychiatric:         Mood and Affect: Mood and affect normal.         Cognition and Memory: Memory normal.         Judgment: Judgment normal.       POCT Urinalysis  Lab Results   Component Value Date/Time    POCCOLOR YELLOW 05/08/2024 05:15 PM    POCAPPEAR CLOUDY 05/08/2024 05:15 PM    POCLEUKEST MODERATE 05/08/2024 05:15 PM    POCNITRITE POSITIVE 05/08/2024 05:15 PM    POCUROBILIGE 0.2 05/08/2024 05:15 PM    POCPROTEIN NEGATIVE 05/08/2024 05:15 PM    POCURPH 7.0 05/08/2024 05:15 PM    POCBLOOD TRACE-INTACT 05/08/2024 05:15 PM    POCSPGRV 1.015 05/08/2024 05:15 PM    POCKETONES NEG 05/08/2024 05:15 PM    POCBILIRUBIN NEG 05/08/2024 05:15 PM    POCGLUCUA NEG 05/08/2024 05:15 PM        Assessment & Plan       1. Acute cystitis with hematuria  - ciprofloxacin (CIPRO) 250 MG Tab; Take 1 Tablet by mouth 2 times a day for 3 days.  Dispense: 6 Tablet; Refill: 0  - POCT Urinalysis  Patient symptoms and urinalysis findings are highly consistent with acute cystitis.  Patient has allergies or intolerances to most antibiotics for the urinary tract.  She says that really she can only tolerate ciprofloxacin.  Cipro sent to pharmacy.  She should continue to drink plenty of fluids.  Follow-up in the urgent care or with her PCP or urologist if symptoms fail to improve.            Differential Diagnosis, natural history, and supportive care discussed. Return to the Urgent Care or follow up with your PCP if symptoms fail to resolve, or for any new or  worsening symptoms. Emergency room precautions discussed. Patient and/or family appears understanding of information.

## 2024-06-07 ENCOUNTER — HOSPITAL ENCOUNTER (OUTPATIENT)
Facility: MEDICAL CENTER | Age: 89
End: 2024-06-07
Payer: MEDICARE

## 2024-06-07 PROCEDURE — 82653 EL-1 FECAL QUANTITATIVE: CPT

## 2024-06-10 LAB — ELASTASE PANC STL-MCNT: 242 UG/G

## 2024-07-15 ENCOUNTER — APPOINTMENT (OUTPATIENT)
Dept: URGENT CARE | Facility: PHYSICIAN GROUP | Age: 89
End: 2024-07-15
Payer: MEDICARE

## 2024-07-27 ENCOUNTER — HOSPITAL ENCOUNTER (OUTPATIENT)
Facility: MEDICAL CENTER | Age: 89
End: 2024-07-27
Payer: MEDICARE

## 2024-07-27 PROCEDURE — 83993 ASSAY FOR CALPROTECTIN FECAL: CPT

## 2024-07-31 LAB — CALPROTECTIN STL-MCNT: 33 UG/G

## 2024-08-02 ENCOUNTER — HOSPITAL ENCOUNTER (OUTPATIENT)
Dept: LAB | Facility: MEDICAL CENTER | Age: 89
End: 2024-08-02
Payer: MEDICARE

## 2024-08-02 LAB
FOLATE SERPL-MCNC: 30.8 NG/ML
VIT B12 SERPL-MCNC: 1792 PG/ML (ref 211–911)

## 2024-08-02 PROCEDURE — 36415 COLL VENOUS BLD VENIPUNCTURE: CPT

## 2024-08-02 PROCEDURE — 82746 ASSAY OF FOLIC ACID SERUM: CPT

## 2024-08-02 PROCEDURE — 82607 VITAMIN B-12: CPT

## 2024-08-13 ENCOUNTER — OFFICE VISIT (OUTPATIENT)
Dept: RHEUMATOLOGY | Facility: MEDICAL CENTER | Age: 89
End: 2024-08-13
Attending: INTERNAL MEDICINE
Payer: MEDICARE

## 2024-08-13 VITALS
DIASTOLIC BLOOD PRESSURE: 70 MMHG | RESPIRATION RATE: 14 BRPM | SYSTOLIC BLOOD PRESSURE: 162 MMHG | HEART RATE: 73 BPM | WEIGHT: 133 LBS | BODY MASS INDEX: 22.13 KG/M2 | TEMPERATURE: 97.7 F | OXYGEN SATURATION: 95 %

## 2024-08-13 DIAGNOSIS — E03.9 HYPOTHYROIDISM, UNSPECIFIED TYPE: ICD-10-CM

## 2024-08-13 DIAGNOSIS — Z79.1 NSAID LONG-TERM USE: ICD-10-CM

## 2024-08-13 DIAGNOSIS — M15.9 PRIMARY OSTEOARTHRITIS INVOLVING MULTIPLE JOINTS: ICD-10-CM

## 2024-08-13 DIAGNOSIS — M81.0 SENILE OSTEOPOROSIS: ICD-10-CM

## 2024-08-13 PROCEDURE — 3078F DIAST BP <80 MM HG: CPT | Performed by: INTERNAL MEDICINE

## 2024-08-13 PROCEDURE — 99214 OFFICE O/P EST MOD 30 MIN: CPT | Performed by: INTERNAL MEDICINE

## 2024-08-13 PROCEDURE — 3077F SYST BP >= 140 MM HG: CPT | Performed by: INTERNAL MEDICINE

## 2024-08-13 PROCEDURE — 99212 OFFICE O/P EST SF 10 MIN: CPT | Performed by: INTERNAL MEDICINE

## 2024-08-13 ASSESSMENT — FIBROSIS 4 INDEX: FIB4 SCORE: 1.79

## 2024-08-13 ASSESSMENT — PATIENT HEALTH QUESTIONNAIRE - PHQ9: CLINICAL INTERPRETATION OF PHQ2 SCORE: 0

## 2024-08-13 NOTE — PROGRESS NOTES
"Chief Complaint- joint pain     Subjective:   Susan Menendez is a 88 y.o. female here today for follow up of rheumatological issues    Of note, patient likes to be called \"Johnathan\"     This is a follow-up visit for this patient who is seen in this clinic for DJD and osteoporosis.  Patient does have a remote history of PMR but currently now off prednisone and CRP and ESR have been normal for some time.  Patient denies any shoulder pain, denies any hip girdle pain.    Patient does use sulindac 150 mg p.o. twice daily as needed although patient states she forgets to take it.  Patient states she does not have a lot of achiness in her hands.    We also follow patient for osteoporosis currently on Prolia 60 mg subcu every 6 months.      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  Additional comorbidities include a diagnosis of autoimmune hepatitis and interstitial cystitis.  Patient also states she had a history of fibromyalgia in the past.  Patient also shares today that she has a past diagnosis of PTSD starting with her sons strokes, patient has not sought any treatment in the past.  Patient also history of low back surgery with benefit in December 2018.    Psychosocial comorbidities include the sudden passing of one of her sons July 2024 of unknown etiology.             AMA neg 8/2021  F-actin neg 8/2021  SSA neg 8/2021  SSB neg 8/2021   Uric acid 3.4 2/2013; Uric acid 2.7 9/2020  RF neg 2/2013  CCP neg 2/2015  MARLEE neg 2/2015  DEXA 10/2014 T scores -2.3, -1.2  DEXA 11/8/2016 T scores -0.8, -2.3    FRAX 11/8/2016 major osteoporotic fracture risk is 23.8%, hip fracture risk 8.4%  DEXA 3/7/2018 T scores -0.6, -2.1  FRAX 3/7/2018 major osteoporotic fracture risk 26.1%, hip fracture risk 9.5%  DEXA 9/9/2020 T scores -4.7 (forearm), -2.4  FRAX 9/9/2020 major " osteoporotic fracture risk 26.8%, hip fracture risk 10.5%    DEXA 11/29/2022 T scores -4.7, -2.1  FRAX 11/29/2022 major osteoporotic fracture risk 25.2%, hip fracture risk 8.8%     Patient has been on Fosamax since March 2018-Fosamax stopped 8/2023, Prolia 60 mg SQ every 6 months started 8/2023     Hand x-rays 9/2020-indicates  DJD  MRI LS spine 10/2016-indicates DJD and mild to moderate neural foraminal narrowing   Left shoulder x-rays 7/2019-   Impression       1.  Mild degenerative change of LEFT shoulder.  2.  No fracture or dislocation.      Right shoulder x-rays 7/2019 -  Impression       1.  Moderate right acromioclavicular osteoarthrosis.  2.  Mild glenohumeral osteoarthrosis.  3.  No acute fracture or dislocation.            Current Outpatient Medications   Medication Sig Dispense Refill    DULoxetine HCl 30 MG Capsule Delayed Release Sprinkle Take 1 Capsule by mouth every day.      acetaminophen (TYLENOL) 500 MG Tab Take 2 tabs up to 3 times a day prn pain 60 Tablet 0    Wheat Dextrin (BENEFIBER) Powder Take 1 Dose by mouth every day. 2 TEASPOONS (STRENGTH UNKNOWN)      polyethylene glycol 3350 (MIRALAX) 17 GM/SCOOP Powder Take 17 g by mouth every day.      levothyroxine (SYNTHROID) 150 MCG Tab Take 150 mcg by mouth every day.      losartan (COZAAR) 25 MG Tab Take 12.5 mg by mouth 1 time a day as needed (as needed based on blood pressure 12.5 mg if bp is over 145).      tamsulosin (FLOMAX) 0.4 MG capsule Take 0.4 mg by mouth every evening.      hydrOXYzine pamoate (VISTARIL) 50 MG Cap Take 1 Capsule by mouth 3 times a day as needed for Itching or Other (nausea, anxiety, insomnia). 20 Capsule 1    Cholecalciferol (VITAMIN D) 2000 units Cap Take 1 Capsule by mouth every day.       No current facility-administered medications for this visit.     She  has a past medical history of Anemia, Anesthesia, Arthritis (02/06/2018), Autoimmune hepatitis ([facility]), Breath shortness (07/24/2023), Cataract (07/24/2023),  Chronic diarrhea, Chronic UTI, COPD (chronic obstructive pulmonary disease) ([facility]), COVID-19 (07/24/2023), Depression, Hiatus hernia syndrome, History of cataract surgery, Hypertension (07/24/2023), Hypothyroid, Interstitial cystitis, Other specified disorder of intestines (07/24/2023), Pain, Pneumonia, Rhinitis, Urinary bladder disorder, and Varicose veins.    ROS   Other than what is mentioned in HPI or physical exam, there is no history of headaches, double vision or blurred vision.  No trouble swallowing difficulties .  No chest complaints including chest pain, cough or sputum production. No GI complaints including nausea, vomiting, change in bowel habits, or past peptic ulcer disease. No history of blood in the stools. No urinary complaints including dysuria or frequency. No history of alopecia, photosensitivity     Objective:     BP (!) 162/70   Pulse 73   Temp 36.5 °C (97.7 °F) (Temporal)   Resp 14   Wt 60.3 kg (133 lb)   SpO2 95%  Body mass index is 22.13 kg/m².   Physical Exam:    Constitutional: Alert and oriented X3, patient is talkative with good eye contact.Skin: Warm, dry, good turgor, no rashes in visible areas.Eye: Equal, round and reactive, conjunctiva clear, lids normal EOM intactENMT: Lips without lesions,  pinna without deformityNeck: Trachea midline, no masses, no thyromegaly.Lymph:  No cervical lymphadenopathy, no axillary lymphadenopathy, no supraclavicular lymphadenopathyRespiratory: Unlabored respiratory effort, lungs clear to auscultation, no wheezes, no ronchi.Cardiovascular: Normal S1, S2, Regular rate and rhythm, no murmurs rubs or gallops  .Abdomen: Soft, non-distended.Psych: Alert and oriented x3, normal affect and mood.Neuro: Cranial nerves 2-12 are grossly intact Ext:no joint laxity noted in bilateral arms, no joint laxity noted in bilateral legs, patient does have some squaring of the thumb CMC joints bilaterally, Heberden's nodes on most DIP joints of both hands, shoulders good  range of motion but poor muscular architecture, elbows without nodules, no flexion contractures, crepitus in knees but no synovitis, toes without crossover toes and without splay toes    Lab Results   Component Value Date/Time    SODIUM 138 02/08/2024 09:13 AM    POTASSIUM 4.4 02/08/2024 09:13 AM    CHLORIDE 105 02/08/2024 09:13 AM    CO2 22 02/08/2024 09:13 AM    GLUCOSE 86 02/08/2024 09:13 AM    BUN 22 02/08/2024 09:13 AM    CREATININE 0.87 02/08/2024 09:13 AM    CREATININE 0.9 01/04/2005 12:05 PM      Lab Results   Component Value Date/Time    WBC 4.2 (L) 02/08/2024 09:13 AM    RBC 4.43 02/08/2024 09:13 AM    HEMOGLOBIN 13.9 02/08/2024 09:13 AM    HEMATOCRIT 42.1 02/08/2024 09:13 AM    MCV 95.0 02/08/2024 09:13 AM    MCH 31.4 02/08/2024 09:13 AM    MCHC 33.0 02/08/2024 09:13 AM    MPV 10.7 02/08/2024 09:13 AM    NEUTSPOLYS 51.50 02/08/2024 09:13 AM    LYMPHOCYTES 35.30 02/08/2024 09:13 AM    MONOCYTES 8.40 02/08/2024 09:13 AM    EOSINOPHILS 2.90 02/08/2024 09:13 AM    BASOPHILS 1.70 02/08/2024 09:13 AM      Lab Results   Component Value Date/Time    CALCIUM 9.8 02/08/2024 09:13 AM    ASTSGOT 22 02/08/2024 09:13 AM    ALTSGPT 14 02/08/2024 09:13 AM    ALKPHOSPHAT 49 02/08/2024 09:13 AM    TBILIRUBIN 0.5 02/08/2024 09:13 AM    ALBUMIN 4.2 02/08/2024 09:13 AM    ALBUMIN 4.29 03/05/2013 09:00 AM    TOTPROTEIN 6.9 02/08/2024 09:13 AM    TOTPROTEIN 7.60 03/05/2013 09:00 AM     Lab Results   Component Value Date/Time    URICACID 2.7 09/08/2020 12:56 PM    RHEUMFACTN 8 02/27/2013 10:56 AM    CCPANTIBODY 3 02/27/2015 08:44 AM    ANTINUCAB None Detected 02/27/2015 08:44 AM     Lab Results   Component Value Date/Time    ANTISSBSJ 0 08/05/2021 01:04 PM     Lab Results   Component Value Date/Time    SEDRATEWES 5 02/08/2024 09:13 AM     Lab Results   Component Value Date/Time    HBA1C 5.5 05/19/2014 09:25 AM     Lab Results   Component Value Date/Time    CPKTOTAL 84 11/30/2016 10:32 AM     Lab Results   Component Value  Date/Time    ANTIMITOCHO 2.8 08/05/2021 01:04 PM    FACTIN 13 08/05/2021 01:04 PM     Assessment and Plan:     1. Primary osteoarthritis involving multiple joints  Clinically stable, patient uses NSAIDs as needed, today check CMP and CBC for evaluation of any untoward side effects of the intermittent NSAIDs.  - DS-BONE DENSITY STUDY (DEXA); Future  - CBC WITH DIFFERENTIAL; Future  - Comp Metabolic Panel; Future  - Sed Rate; Future    2. NSAID long-term use  Today check CMP and CBC for evaluation of any side effects of intermittent NSAIDs  - DS-BONE DENSITY STUDY (DEXA); Future  - CBC WITH DIFFERENTIAL; Future  - Comp Metabolic Panel; Future  - Sed Rate; Future    3. Senile osteoporosis  Last DEXA November 2022 next DEXA due November 2024, DEXA ordered for patient patient currently on Prolia 60 mg subcu every 6 months   continue calcium citrate 1200 mg by mouth daily and vitamin D about 2000 units by mouth daily and magnesium 200 mg by mouth daily  - DS-BONE DENSITY STUDY (DEXA); Future  - CBC WITH DIFFERENTIAL; Future  - Comp Metabolic Panel; Future  - Sed Rate; Future    4. Hypothyroidism, unspecified type  Followed by patients PCP, can exacerbate joint pain, I recommend monitoring thyroid on a regular basis to assure it is not contributing to the patient's joint pain    Followup: Return in about 1 year (around 8/13/2025). or sooner prn      Please note that this dictation was created using voice recognition software. I have made every reasonable attempt to correct obvious errors, but I expect that there are errors of grammar and possibly content that I did not discover before finalizing the note.

## 2024-08-16 ENCOUNTER — HOSPITAL ENCOUNTER (OUTPATIENT)
Dept: LAB | Facility: MEDICAL CENTER | Age: 89
End: 2024-08-16
Attending: INTERNAL MEDICINE
Payer: MEDICARE

## 2024-08-16 DIAGNOSIS — Z79.1 NSAID LONG-TERM USE: ICD-10-CM

## 2024-08-16 DIAGNOSIS — M81.0 SENILE OSTEOPOROSIS: ICD-10-CM

## 2024-08-16 DIAGNOSIS — M15.9 PRIMARY OSTEOARTHRITIS INVOLVING MULTIPLE JOINTS: ICD-10-CM

## 2024-08-16 LAB
ALBUMIN SERPL BCP-MCNC: 4.1 G/DL (ref 3.2–4.9)
ALBUMIN/GLOB SERPL: 2 G/DL
ALP SERPL-CCNC: 56 U/L (ref 30–99)
ALT SERPL-CCNC: 15 U/L (ref 2–50)
ANION GAP SERPL CALC-SCNC: 12 MMOL/L (ref 7–16)
AST SERPL-CCNC: 24 U/L (ref 12–45)
BASOPHILS # BLD AUTO: 0.7 % (ref 0–1.8)
BASOPHILS # BLD: 0.04 K/UL (ref 0–0.12)
BILIRUB SERPL-MCNC: 0.3 MG/DL (ref 0.1–1.5)
BUN SERPL-MCNC: 19 MG/DL (ref 8–22)
CALCIUM ALBUM COR SERPL-MCNC: 9 MG/DL (ref 8.5–10.5)
CALCIUM SERPL-MCNC: 9.1 MG/DL (ref 8.5–10.5)
CHLORIDE SERPL-SCNC: 101 MMOL/L (ref 96–112)
CO2 SERPL-SCNC: 22 MMOL/L (ref 20–33)
CREAT SERPL-MCNC: 0.91 MG/DL (ref 0.5–1.4)
EOSINOPHIL # BLD AUTO: 0.08 K/UL (ref 0–0.51)
EOSINOPHIL NFR BLD: 1.5 % (ref 0–6.9)
ERYTHROCYTE [DISTWIDTH] IN BLOOD BY AUTOMATED COUNT: 47.8 FL (ref 35.9–50)
ERYTHROCYTE [SEDIMENTATION RATE] IN BLOOD BY WESTERGREN METHOD: 10 MM/HOUR (ref 0–25)
GFR SERPLBLD CREATININE-BSD FMLA CKD-EPI: 61 ML/MIN/1.73 M 2
GLOBULIN SER CALC-MCNC: 2.1 G/DL (ref 1.9–3.5)
GLUCOSE SERPL-MCNC: 88 MG/DL (ref 65–99)
HCT VFR BLD AUTO: 37.8 % (ref 37–47)
HGB BLD-MCNC: 12.7 G/DL (ref 12–16)
IMM GRANULOCYTES # BLD AUTO: 0.02 K/UL (ref 0–0.11)
IMM GRANULOCYTES NFR BLD AUTO: 0.4 % (ref 0–0.9)
LYMPHOCYTES # BLD AUTO: 1.95 K/UL (ref 1–4.8)
LYMPHOCYTES NFR BLD: 35.5 % (ref 22–41)
MCH RBC QN AUTO: 32 PG (ref 27–33)
MCHC RBC AUTO-ENTMCNC: 33.6 G/DL (ref 32.2–35.5)
MCV RBC AUTO: 95.2 FL (ref 81.4–97.8)
MONOCYTES # BLD AUTO: 0.54 K/UL (ref 0–0.85)
MONOCYTES NFR BLD AUTO: 9.8 % (ref 0–13.4)
NEUTROPHILS # BLD AUTO: 2.86 K/UL (ref 1.82–7.42)
NEUTROPHILS NFR BLD: 52.1 % (ref 44–72)
NRBC # BLD AUTO: 0 K/UL
NRBC BLD-RTO: 0 /100 WBC (ref 0–0.2)
PLATELET # BLD AUTO: 257 K/UL (ref 164–446)
PMV BLD AUTO: 10 FL (ref 9–12.9)
POTASSIUM SERPL-SCNC: 4.2 MMOL/L (ref 3.6–5.5)
PROT SERPL-MCNC: 6.2 G/DL (ref 6–8.2)
RBC # BLD AUTO: 3.97 M/UL (ref 4.2–5.4)
SODIUM SERPL-SCNC: 135 MMOL/L (ref 135–145)
WBC # BLD AUTO: 5.5 K/UL (ref 4.8–10.8)

## 2024-08-16 PROCEDURE — 85652 RBC SED RATE AUTOMATED: CPT

## 2024-08-16 PROCEDURE — 85025 COMPLETE CBC W/AUTO DIFF WBC: CPT

## 2024-08-16 PROCEDURE — 80053 COMPREHEN METABOLIC PANEL: CPT

## 2024-08-16 PROCEDURE — 36415 COLL VENOUS BLD VENIPUNCTURE: CPT

## 2024-09-03 ENCOUNTER — HOSPITAL ENCOUNTER (OUTPATIENT)
Dept: LAB | Facility: MEDICAL CENTER | Age: 89
End: 2024-09-03
Attending: NURSE PRACTITIONER
Payer: MEDICARE

## 2024-09-03 LAB — ESTRADIOL SERPL-MCNC: 10.8 PG/ML

## 2024-09-03 PROCEDURE — 84270 ASSAY OF SEX HORMONE GLOBUL: CPT

## 2024-09-03 PROCEDURE — 84403 ASSAY OF TOTAL TESTOSTERONE: CPT

## 2024-09-03 PROCEDURE — 84402 ASSAY OF FREE TESTOSTERONE: CPT

## 2024-09-03 PROCEDURE — 82670 ASSAY OF TOTAL ESTRADIOL: CPT

## 2024-09-03 PROCEDURE — 36415 COLL VENOUS BLD VENIPUNCTURE: CPT

## 2024-09-09 LAB
SHBG SERPL-SCNC: 84 NMOL/L (ref 17–125)
TESTOST FREE SERPL-MCNC: 5.1 PG/ML (ref 0.6–3.8)
TESTOST SERPL-MCNC: 56 NG/DL (ref 5–32)

## 2024-10-18 ENCOUNTER — APPOINTMENT (OUTPATIENT)
Dept: ONCOLOGY | Facility: MEDICAL CENTER | Age: 89
End: 2024-10-18
Attending: INTERNAL MEDICINE
Payer: MEDICARE

## 2024-10-18 VITALS
HEIGHT: 65 IN | SYSTOLIC BLOOD PRESSURE: 132 MMHG | OXYGEN SATURATION: 96 % | DIASTOLIC BLOOD PRESSURE: 63 MMHG | TEMPERATURE: 97.9 F | BODY MASS INDEX: 22.19 KG/M2 | HEART RATE: 76 BPM | WEIGHT: 133.16 LBS | RESPIRATION RATE: 18 BRPM

## 2024-10-18 DIAGNOSIS — M81.0 SENILE OSTEOPOROSIS: ICD-10-CM

## 2024-10-18 LAB
CA-I BLD ISE-SCNC: 1.22 MMOL/L (ref 1.1–1.3)
CREAT BLD-MCNC: 1 MG/DL (ref 0.5–1.4)

## 2024-10-18 PROCEDURE — 82565 ASSAY OF CREATININE: CPT

## 2024-10-18 PROCEDURE — 36415 COLL VENOUS BLD VENIPUNCTURE: CPT

## 2024-10-18 PROCEDURE — 82330 ASSAY OF CALCIUM: CPT

## 2024-10-18 PROCEDURE — 96372 THER/PROPH/DIAG INJ SC/IM: CPT

## 2024-10-18 PROCEDURE — 700111 HCHG RX REV CODE 636 W/ 250 OVERRIDE (IP): Mod: JZ,JG | Performed by: INTERNAL MEDICINE

## 2024-10-18 RX ORDER — EPINEPHRINE 1 MG/ML(1)
0.5 AMPUL (ML) INJECTION PRN
OUTPATIENT
Start: 2025-03-24

## 2024-10-18 RX ORDER — DIPHENHYDRAMINE HYDROCHLORIDE 50 MG/ML
50 INJECTION INTRAMUSCULAR; INTRAVENOUS PRN
OUTPATIENT
Start: 2025-03-24

## 2024-10-18 RX ORDER — METHYLPREDNISOLONE SODIUM SUCCINATE 125 MG/2ML
125 INJECTION, POWDER, LYOPHILIZED, FOR SOLUTION INTRAMUSCULAR; INTRAVENOUS PRN
OUTPATIENT
Start: 2025-03-24

## 2024-10-18 RX ADMIN — DENOSUMAB 60 MG: 60 INJECTION SUBCUTANEOUS at 15:40

## 2024-10-18 ASSESSMENT — FIBROSIS 4 INDEX: FIB4 SCORE: 2.1

## 2024-10-29 ENCOUNTER — HOSPITAL ENCOUNTER (OUTPATIENT)
Dept: LAB | Facility: MEDICAL CENTER | Age: 89
End: 2024-10-29
Attending: FAMILY MEDICINE
Payer: MEDICARE

## 2024-10-29 LAB
T3 SERPL-MCNC: 68.8 NG/DL (ref 60–181)
T4 SERPL-MCNC: 6.7 UG/DL (ref 4–12)

## 2024-10-29 PROCEDURE — 36415 COLL VENOUS BLD VENIPUNCTURE: CPT

## 2024-10-29 PROCEDURE — 84480 ASSAY TRIIODOTHYRONINE (T3): CPT

## 2024-10-29 PROCEDURE — 84436 ASSAY OF TOTAL THYROXINE: CPT

## 2024-10-29 PROCEDURE — 84443 ASSAY THYROID STIM HORMONE: CPT

## 2024-10-30 LAB — TSH SERPL-ACNC: 3.52 UIU/ML (ref 0.35–5.5)

## 2024-11-21 ENCOUNTER — HOSPITAL ENCOUNTER (OUTPATIENT)
Dept: LAB | Facility: MEDICAL CENTER | Age: 89
End: 2024-11-21
Attending: NURSE PRACTITIONER
Payer: MEDICARE

## 2024-11-21 LAB
CANCER AG125 SERPL-ACNC: 11.5 U/ML (ref 0–35)
ESTRADIOL SERPL-MCNC: 17.8 PG/ML

## 2024-11-21 PROCEDURE — 36415 COLL VENOUS BLD VENIPUNCTURE: CPT

## 2024-11-21 PROCEDURE — 86304 IMMUNOASSAY TUMOR CA 125: CPT | Mod: GA

## 2024-11-21 PROCEDURE — 82670 ASSAY OF TOTAL ESTRADIOL: CPT

## 2024-12-10 ENCOUNTER — HOSPITAL ENCOUNTER (OUTPATIENT)
Dept: RADIOLOGY | Facility: MEDICAL CENTER | Age: 89
End: 2024-12-10
Attending: INTERNAL MEDICINE
Payer: MEDICARE

## 2024-12-10 DIAGNOSIS — M81.0 SENILE OSTEOPOROSIS: ICD-10-CM

## 2024-12-10 DIAGNOSIS — M15.0 PRIMARY OSTEOARTHRITIS INVOLVING MULTIPLE JOINTS: ICD-10-CM

## 2024-12-10 DIAGNOSIS — Z79.1 NSAID LONG-TERM USE: ICD-10-CM

## 2024-12-10 PROCEDURE — 77080 DXA BONE DENSITY AXIAL: CPT

## 2024-12-12 ENCOUNTER — TELEPHONE (OUTPATIENT)
Dept: RHEUMATOLOGY | Facility: MEDICAL CENTER | Age: 89
End: 2024-12-12
Payer: MEDICARE

## 2024-12-12 DIAGNOSIS — M81.0 SENILE OSTEOPOROSIS: ICD-10-CM

## 2024-12-12 RX ORDER — TERIPARATIDE 250 UG/ML
INJECTION, SOLUTION SUBCUTANEOUS
Qty: 7.2 ML | Refills: 0 | Status: SHIPPED | OUTPATIENT
Start: 2024-12-12

## 2024-12-12 NOTE — TELEPHONE ENCOUNTER
Please notify patient that we received the results of her bone density scan and it has worsened especially in her hip since last bone density scan, recommend switching the Prolia injections to a daily shot called Forteo which is a shot to patient to give herself once a day for 2 years.    Is patient okay with this?  If so then I will order the Forteo.

## 2024-12-12 NOTE — TELEPHONE ENCOUNTER
I called and spoke with pt. She agreed to trying the Forteo as long as it didn't cost her an arm and a leg. Please call it into the [facility] on [address] and we will see what happens. Thank you

## 2024-12-17 DIAGNOSIS — M81.0 SENILE OSTEOPOROSIS: ICD-10-CM

## 2024-12-17 RX ORDER — ABALOPARATIDE 2000 UG/ML
INJECTION, SOLUTION SUBCUTANEOUS
Qty: 4.68 ML | Refills: 0 | Status: SHIPPED | OUTPATIENT
Start: 2024-12-17

## 2024-12-19 ENCOUNTER — TELEPHONE (OUTPATIENT)
Dept: RHEUMATOLOGY | Facility: MEDICAL CENTER | Age: 89
End: 2024-12-19
Payer: MEDICARE

## 2024-12-19 NOTE — TELEPHONE ENCOUNTER
Prior Authorization for Tymlos 3120MCG/1.56ML pen-injectors (Quantity: 1, Days: 30) has been submitted via Cover My Meds: Key ([id number])    Insurance: [employer]     Will follow up in 24-48 business hours.

## 2024-12-19 NOTE — TELEPHONE ENCOUNTER
Prior Authorization for     Tymlos 3120MCG/1.56ML pen-injectors    has been approved for a quantity of 1 , day supply 30    Prior Authorization reference number: [id number]  Insurance: [employer]  Effective dates: 12/19/2024 to 12/19/2025  Copay: $N/A

## 2025-01-07 ENCOUNTER — HOSPITAL ENCOUNTER (OUTPATIENT)
Dept: RADIOLOGY | Facility: MEDICAL CENTER | Age: 89
End: 2025-01-07
Attending: OBSTETRICS & GYNECOLOGY
Payer: MEDICARE

## 2025-01-07 DIAGNOSIS — R14.0 GASTRIC TYMPANY: ICD-10-CM

## 2025-01-07 PROCEDURE — 76830 TRANSVAGINAL US NON-OB: CPT

## 2025-04-10 ENCOUNTER — HOSPITAL ENCOUNTER (OUTPATIENT)
Dept: LAB | Facility: MEDICAL CENTER | Age: 89
End: 2025-04-10
Payer: MEDICARE

## 2025-04-10 LAB — UREA BREATH TEST QL: NEGATIVE

## 2025-04-10 PROCEDURE — 83013 H PYLORI (C-13) BREATH: CPT

## 2025-04-25 ENCOUNTER — TELEPHONE (OUTPATIENT)
Dept: ONCOLOGY | Facility: MEDICAL CENTER | Age: 89
End: 2025-04-25

## 2025-04-25 NOTE — TELEPHONE ENCOUNTER
Tried to call pt and her  and was not able to reach either one of them, Called and lvm for her son in law ED and asked that he return call if patient would like to reschedule her appt with us for her prolia injection that she missed today.

## 2025-05-08 ENCOUNTER — OUTPATIENT INFUSION SERVICES (OUTPATIENT)
Dept: ONCOLOGY | Facility: MEDICAL CENTER | Age: 89
End: 2025-05-08
Attending: FAMILY MEDICINE
Payer: MEDICARE

## 2025-05-08 VITALS
RESPIRATION RATE: 18 BRPM | HEIGHT: 65 IN | HEART RATE: 78 BPM | OXYGEN SATURATION: 95 % | WEIGHT: 135.8 LBS | DIASTOLIC BLOOD PRESSURE: 59 MMHG | BODY MASS INDEX: 22.63 KG/M2 | SYSTOLIC BLOOD PRESSURE: 116 MMHG | TEMPERATURE: 98.7 F

## 2025-05-08 DIAGNOSIS — M81.0 SENILE OSTEOPOROSIS: ICD-10-CM

## 2025-05-08 LAB
CA-I BLD ISE-SCNC: 1.14 MMOL/L (ref 1.1–1.3)
CREAT BLD-MCNC: 1.2 MG/DL (ref 0.5–1.4)

## 2025-05-08 PROCEDURE — 82330 ASSAY OF CALCIUM: CPT

## 2025-05-08 PROCEDURE — 700111 HCHG RX REV CODE 636 W/ 250 OVERRIDE (IP): Mod: JZ,TB | Performed by: FAMILY MEDICINE

## 2025-05-08 PROCEDURE — 36415 COLL VENOUS BLD VENIPUNCTURE: CPT

## 2025-05-08 PROCEDURE — 96372 THER/PROPH/DIAG INJ SC/IM: CPT

## 2025-05-08 PROCEDURE — 82565 ASSAY OF CREATININE: CPT

## 2025-05-08 RX ADMIN — DENOSUMAB 60 MG: 60 INJECTION SUBCUTANEOUS at 14:54

## 2025-05-08 ASSESSMENT — FIBROSIS 4 INDEX: FIB4 SCORE: 2.1

## 2025-05-08 NOTE — PROGRESS NOTES
Pt arrived to [facility], ambulatory, for Prolia injection. Pt voices no complaints. Pt denies any recent or upcoming dental surgeries. Labs drawn and reviewed, pt meets parameters for injection. Prolia injected into the R-back arm with no complications. Pt left [facility] with no s/sx of distress. Follow up appointment confirmed.

## 2025-06-24 ENCOUNTER — HOSPITAL ENCOUNTER (OUTPATIENT)
Facility: MEDICAL CENTER | Age: 89
End: 2025-06-24
Payer: MEDICARE

## 2025-06-24 ENCOUNTER — OFFICE VISIT (OUTPATIENT)
Dept: URGENT CARE | Facility: PHYSICIAN GROUP | Age: 89
End: 2025-06-24
Payer: MEDICARE

## 2025-06-24 VITALS
HEIGHT: 65 IN | DIASTOLIC BLOOD PRESSURE: 60 MMHG | TEMPERATURE: 97.9 F | OXYGEN SATURATION: 97 % | BODY MASS INDEX: 22.98 KG/M2 | HEART RATE: 79 BPM | RESPIRATION RATE: 16 BRPM | WEIGHT: 137.94 LBS | SYSTOLIC BLOOD PRESSURE: 122 MMHG

## 2025-06-24 DIAGNOSIS — N30.00 ACUTE CYSTITIS WITHOUT HEMATURIA: ICD-10-CM

## 2025-06-24 DIAGNOSIS — R39.9 UTI SYMPTOMS: Primary | ICD-10-CM

## 2025-06-24 DIAGNOSIS — R39.9 UTI SYMPTOMS: ICD-10-CM

## 2025-06-24 LAB
APPEARANCE UR: NORMAL
BILIRUB UR STRIP-MCNC: NEGATIVE MG/DL
COLOR UR AUTO: YELLOW
GLUCOSE UR STRIP.AUTO-MCNC: NEGATIVE MG/DL
KETONES UR STRIP.AUTO-MCNC: NEGATIVE MG/DL
LEUKOCYTE ESTERASE UR QL STRIP.AUTO: NORMAL
NITRITE UR QL STRIP.AUTO: NEGATIVE
PH UR STRIP.AUTO: 6 [PH] (ref 5–8)
PROT UR QL STRIP: NEGATIVE MG/DL
RBC UR QL AUTO: NORMAL
SP GR UR STRIP.AUTO: 1.01
UROBILINOGEN UR STRIP-MCNC: 0.2 MG/DL

## 2025-06-24 PROCEDURE — 87186 SC STD MICRODIL/AGAR DIL: CPT

## 2025-06-24 PROCEDURE — 3078F DIAST BP <80 MM HG: CPT

## 2025-06-24 PROCEDURE — 99214 OFFICE O/P EST MOD 30 MIN: CPT

## 2025-06-24 PROCEDURE — 87077 CULTURE AEROBIC IDENTIFY: CPT

## 2025-06-24 PROCEDURE — 87086 URINE CULTURE/COLONY COUNT: CPT

## 2025-06-24 PROCEDURE — 3074F SYST BP LT 130 MM HG: CPT

## 2025-06-24 PROCEDURE — 81002 URINALYSIS NONAUTO W/O SCOPE: CPT

## 2025-06-24 RX ORDER — CIPROFLOXACIN 500 MG/1
500 TABLET, FILM COATED ORAL 2 TIMES DAILY
Qty: 6 TABLET | Refills: 0 | Status: SHIPPED | OUTPATIENT
Start: 2025-06-24 | End: 2025-06-27

## 2025-06-24 ASSESSMENT — ENCOUNTER SYMPTOMS
NECK PAIN: 0
BLOOD IN STOOL: 0
FOCAL WEAKNESS: 0
NAUSEA: 0
FEVER: 0
CHILLS: 0
MYALGIAS: 0
BACK PAIN: 0
WEAKNESS: 0
DIZZINESS: 0
ABDOMINAL PAIN: 0
STRIDOR: 0
FLANK PAIN: 0
SHORTNESS OF BREATH: 0
VOMITING: 0
DIARRHEA: 1

## 2025-06-24 ASSESSMENT — VISUAL ACUITY: OU: 1

## 2025-06-24 ASSESSMENT — FIBROSIS 4 INDEX: FIB4 SCORE: 2.1

## 2025-06-24 NOTE — PROGRESS NOTES
Subjective     Jessie Menendez is a 89 y.o. female who presents with UTI (C/o dysuria, weakness, fatigue x1 day. )    HPI:   Jessie is a 89yo female presenting for dysuria and urinary frequency x1 day. Reports history of recurrent UTI. Denies abdominal pain. No abnormal vaginal discharge or itching. Denies flank pain, no fevers/chills. Patient reports an episode of diarrhea recently prior to the onset of urinary symptoms. Denies hematuria. Denies shortness of breath or vomiting.         Review of Systems   Constitutional:  Negative for chills, fever and malaise/fatigue.   Respiratory:  Negative for shortness of breath and stridor.    Gastrointestinal:  Positive for diarrhea. Negative for abdominal pain, blood in stool, melena, nausea and vomiting.   Genitourinary:  Positive for dysuria, frequency and urgency. Negative for flank pain and hematuria.   Musculoskeletal:  Negative for back pain, myalgias and neck pain.   Skin:  Negative for itching and rash.   Neurological:  Negative for dizziness, focal weakness and weakness.     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, hands, neck    Autoimmune hepatitis (HCC)     Breath shortness 07/24/2023    occasional    Cataract 07/24/2023    Chronic diarrhea     Chronic UTI     COPD (chronic obstructive pulmonary disease) (HCC)     COVID-19 07/24/2023 5/2021    Depression     Hiatus hernia syndrome     History of cataract surgery     left    Hypertension 07/24/2023    on medication    Hypothyroid     Interstitial cystitis     Other specified disorder of intestines 07/24/2023    past history of constipation    Pain     Pneumonia     30 years ago    Rhinitis     Urinary bladder disorder     Varicose veins      Past Surgical History:  Procedure Laterality Date    PB RECONSTRUC TOE DEFORM,SOFT TISSUE Bilateral 8/7/2023    Procedure: BILATERAL 5TH  METATARSALPHALANGEAL JOINT RECONSTRUCTION;  Surgeon:  "Frandy Carmen M.D.;  Location: SURGERY [facility];  Service: Orthopedics    METATARSAL HEAD RESECTION Bilateral 8/7/2023    Procedure: BILATERAL PARTIAL EXCISION 5TH METATARSAL HEAD,;  Surgeon: Frandy Carmen M.D.;  Location: SURGERY [facility];  Service: Orthopedics    PB RECONSTRUC TOE DEFORM,SOFT TISSUE Left 3/16/2022    Procedure: LEFT FOOT LESSER TOES FLEXOR TENDON RELEASE, REPAIRS AS INDICATED;  Surgeon: Frandy Carmen M.D.;  Location: [facility];  Service: Orthopedics    VT TENOTOMY PERC TOE SINGLE TENDON Right 3/16/2022    Procedure: RIGHT FOOT THIRD FLEXOR TENDON RELEASE;  Surgeon: Frandy Carmen M.D.;  Location: [facility];  Service: Orthopedics    PB OSTEOTOMY METATARSAL (NOT 1ST) Right 3/16/2022    Procedure: RIGHT BUNIONETTE CORRECTION, REPAIRS AS INDICATED;  Surgeon: Frandy Carmen M.D.;  Location: [facility];  Service: Orthopedics    FUSION, SPINE, LUMBAR, PLIF N/A 12/3/2018    Procedure: L4-5 TLIF;  Surgeon: Giancarlo Berman M.D.;  Location: [facility];  Service: Neurosurgery    DOREEN BY LAPAROSCOPY  2/12/2018    Procedure: DOREEN BY LAPAROSCOPY;  Surgeon: John H Ganser, M.D.;  Location: [facility];  Service: General    BLADDER BIOPSY WITH CYSTOSCOPY  6/12/2012    Performed by JARAD GARCIA at [facility]    OTHER      vein stripping both legs    OTHER      \"breast lumps removed\"    OTHER ORTHOPEDIC SURGERY      right knee scope    OTHER ORTHOPEDIC SURGERY      aileen foot surgery     Allergies: Ceftin [cefuroxime axetil], Amoxicillin-pot clavulanate, Levofloxacin, Amoxicillin, Augmentin, Gluten meal, Keflex, Macrodantin [kdc:red dye+yellow dye+ci pigment blue 63+nitrofurantoin], Morphine, Nitrofurantoin, Pcn [penicillins], Sulfa drugs, and Cephalexin     Current Outpatient Medications:     abaloparatide 3120 MCG/1.56ML Solution Pen-injector, 80 mcg SQ every day, Disp: 4.68 mL, Rfl: " "0    teriparatide, Recombinant, 600 MCG/2.4ML Solution Pen-injector, Inject 20 mcg subcutaneously every day, Disp: 7.2 mL, Rfl: 0    DULoxetine HCl 30 MG Capsule Delayed Release Sprinkle, Take 1 Capsule by mouth every day., Disp: , Rfl:     acetaminophen (TYLENOL) 500 MG Tab, Take 2 tabs up to 3 times a day prn pain, Disp: 60 Tablet, Rfl: 0    Wheat Dextrin (BENEFIBER) Powder, Take 1 Dose by mouth every day. 2 TEASPOONS (STRENGTH UNKNOWN), Disp: , Rfl:     polyethylene glycol 3350 (MIRALAX) 17 GM/SCOOP Powder, Take 17 g by mouth every day., Disp: , Rfl:     levothyroxine (SYNTHROID) 150 MCG Tab, Take 150 mcg by mouth every day., Disp: , Rfl:     losartan (COZAAR) 25 MG Tab, Take 12.5 mg by mouth 1 time a day as needed (as needed based on blood pressure 12.5 mg if bp is over 145)., Disp: , Rfl:     tamsulosin (FLOMAX) 0.4 MG capsule, Take 0.4 mg by mouth every evening., Disp: , Rfl:     Cholecalciferol (VITAMIN D) 2000 units Cap, Take 1 Capsule by mouth every day., Disp: , Rfl:     Social History  Tobacco Use    Smoking status: Former     Current packs/day: 0.00     Average packs/day: 0.5 packs/day for 2.0 years (1.0 ttl pk-yrs)     Types: Cigarettes     Start date: 1958     Quit date: 1960     Years since quittin.5     Passive exposure: Never    Smokeless tobacco: Never   Vaping Use    Vaping status: Never Used   Substance Use Topics    Alcohol use: Not Currently    Drug use: No     History reviewed. No pertinent family history.     Medications, Allergies, and current problem list reviewed today in Epic.      Objective     /60 (BP Location: Left arm, Patient Position: Sitting, BP Cuff Size: Small adult)   Pulse 79   Temp 36.6 °C (97.9 °F) (Temporal)   Resp 16   Ht 1.651 m (5' 5\") Comment: PT reported  Wt 62.6 kg (137 lb 15.1 oz)   SpO2 97%   BMI 22.95 kg/m²      Physical Exam  Vitals reviewed.   Constitutional:       General: She is not in acute distress.  HENT:      Nose: Nose normal. "   Eyes:      General: Vision grossly intact. Gaze aligned appropriately. No visual field deficit.     Extraocular Movements: Extraocular movements intact.      Conjunctiva/sclera: Conjunctivae normal.      Pupils: Pupils are equal, round, and reactive to light.   Cardiovascular:      Rate and Rhythm: Normal rate and regular rhythm.      Pulses: Normal pulses.      Heart sounds: Normal heart sounds.   Pulmonary:      Effort: Pulmonary effort is normal. No tachypnea, accessory muscle usage, prolonged expiration, respiratory distress or retractions.      Breath sounds: Normal breath sounds. No stridor, decreased air movement or transmitted upper airway sounds. No wheezing, rhonchi or rales.   Abdominal:      General: Abdomen is flat. There is no distension.      Palpations: Abdomen is soft. There is no mass.      Tenderness: There is no abdominal tenderness. There is no right CVA tenderness, left CVA tenderness, guarding or rebound.      Hernia: No hernia is present.   Musculoskeletal:         General: Normal range of motion.      Cervical back: Full passive range of motion without pain, normal range of motion and neck supple. No rigidity. Normal range of motion.   Skin:     General: Skin is warm and dry.      Findings: No rash.   Neurological:      Mental Status: She is alert. Mental status is at baseline.      Sensory: Sensation is intact.      Motor: Motor function is intact.      Coordination: Coordination is intact.      Gait: Gait is intact.   Psychiatric:         Mood and Affect: Mood normal.         Behavior: Behavior normal.         Thought Content: Thought content normal.       Results for orders placed or performed in visit on 06/24/25   POCT Urinalysis    Collection Time: 06/24/25  2:45 PM   Result Value Ref Range    POC Color Yellow Negative    POC Appearance Cloudy Negative    POC Glucose Negative Negative mg/dL    POC Bilirubin Negative Negative mg/dL    POC Ketones Negative Negative mg/dL    POC  Specific Gravity 1.010 <1.005 - >1.030    POC Blood Trace-Intact Negative    POC Urine PH 6.0 5.0 - 8.0    POC Protein Negative Negative mg/dL    POC Urobiligen 0.2 Negative (0.2) mg/dL    POC Nitrites Negative Negative    POC Leukocyte Esterase Moderate Negative     *Note: Due to a large number of results and/or encounters for the requested time period, some results have not been displayed. A complete set of results can be found in Results Review.      Assessment & Plan    1. UTI symptoms (Primary)   - POCT Urinalysis  - URINE CULTURE(NEW); Future    2. Acute cystitis without hematuria   - ciprofloxacin (CIPRO) 500 MG Tab; Take 1 Tablet by mouth 2 times a day for 3 days.  Dispense: 6 Tablet; Refill: 0       MDM/Comments:   Urinalysis and symptom presentation consistent with diagnosis of acute cystitis.   Patient has allergies or intolerances to most antibiotics for the urinary tract. She says that really she can only tolerate ciprofloxacin. Cipro sent to pharmacy. She should continue to drink plenty of fluids.     Cockcroft-Gault method utilized to calculate CrCl, no renal dosing required.     Illness progression and alarm symptoms discussed with patient, emphasizing low threshold for returning to clinic/emergency department for worsening symptoms. Patient is agreeable to the plan and verbalizes understanding, and will follow up if warranted. Will return if body aches, chills, fever, back/flank pain occur. Discussed signs of kidney infection.       Patient advised to follow up with PCP and urology for recurrent, frequent urinary infections.     Differential diagnosis, natural history, supportive care, and indications for immediate follow-up discussed.      Follow-up as needed if symptoms worsen or fail to improve to PCP, Urgent care or Emergency Room.       Discussed red flags and reasons to return to UC or ED.         I personally reviewed prior external notes and test results pertinent to today's visit.  I have  independently reviewed and interpreted all diagnostics ordered during this urgent care visit.                                     Electronically signed by BEAR Romeo

## 2025-06-26 ENCOUNTER — RESULTS FOLLOW-UP (OUTPATIENT)
Dept: URGENT CARE | Facility: PHYSICIAN GROUP | Age: 89
End: 2025-06-26

## 2025-06-26 ENCOUNTER — TELEPHONE (OUTPATIENT)
Dept: URGENT CARE | Facility: PHYSICIAN GROUP | Age: 89
End: 2025-06-26
Payer: MEDICARE

## 2025-06-26 LAB
BACTERIA UR CULT: NORMAL
SIGNIFICANT IND 70042: NORMAL
SITE SITE: NORMAL
SOURCE SOURCE: NORMAL

## 2025-06-26 NOTE — TELEPHONE ENCOUNTER
Patient contacted via telephone regarding no improvement in symptoms with use of Ciprofloxacin.     Patient has allergies or intolerances to most antibiotics for the urinary tract. Urine culture is still in process.     Patient advised to present to the emergency room if no improvement or worsening symptoms by tonight.     Will notify patient of urine culture result once it is completed.       Illness progression and alarm symptoms discussed with patient, emphasizing low threshold for returning to clinic/emergency department for worsening symptoms. Patient is agreeable to the plan and verbalizes understanding, and will follow up if warranted.                     Electronically signed by BEAR Romeo

## 2025-06-26 NOTE — TELEPHONE ENCOUNTER
This pt. called and requested information in regards to receiving an alternative Rx due to the current Rx is not effective. We discussed to finish current Rx and I would reach out to the previous provider and request a follow up w/ plan of care for this patient. Pt. Did have questions regarding her urine culture and I stated I would add this concern to my message to the provider, requesting a discussion for an alternative Rx and urine culture results. No further questions or concerns were expressed. Provider has been notified.

## 2025-08-13 ENCOUNTER — OFFICE VISIT (OUTPATIENT)
Dept: RHEUMATOLOGY | Facility: MEDICAL CENTER | Age: 89
End: 2025-08-13
Attending: STUDENT IN AN ORGANIZED HEALTH CARE EDUCATION/TRAINING PROGRAM
Payer: MEDICARE

## 2025-08-13 VITALS
OXYGEN SATURATION: 97 % | SYSTOLIC BLOOD PRESSURE: 110 MMHG | TEMPERATURE: 96.9 F | RESPIRATION RATE: 18 BRPM | HEIGHT: 65 IN | DIASTOLIC BLOOD PRESSURE: 58 MMHG | BODY MASS INDEX: 22.99 KG/M2 | WEIGHT: 138 LBS | HEART RATE: 84 BPM

## 2025-08-13 DIAGNOSIS — M15.0 PRIMARY OSTEOARTHRITIS INVOLVING MULTIPLE JOINTS: Primary | ICD-10-CM

## 2025-08-13 DIAGNOSIS — M81.0 AGE-RELATED OSTEOPOROSIS WITHOUT CURRENT PATHOLOGICAL FRACTURE: ICD-10-CM

## 2025-08-13 DIAGNOSIS — Z79.1 NSAID LONG-TERM USE: ICD-10-CM

## 2025-08-13 PROBLEM — M79.672 PAIN OF LEFT FOOT: Status: RESOLVED | Noted: 2022-01-13 | Resolved: 2025-08-13

## 2025-08-13 PROBLEM — R35.0 INCREASED FREQUENCY OF URINATION: Status: RESOLVED | Noted: 2018-05-01 | Resolved: 2025-08-13

## 2025-08-13 PROBLEM — N39.0 URINARY TRACT INFECTIOUS DISEASE: Status: RESOLVED | Noted: 2021-08-08 | Resolved: 2025-08-13

## 2025-08-13 PROBLEM — M79.672 FOOT PAIN, LEFT: Status: RESOLVED | Noted: 2022-03-16 | Resolved: 2025-08-13

## 2025-08-13 PROBLEM — K75.4 AUTOIMMUNE HEPATITIS (HCC): Status: RESOLVED | Noted: 2023-05-12 | Resolved: 2025-08-13

## 2025-08-13 PROBLEM — M79.671 PAIN IN RIGHT FOOT: Status: RESOLVED | Noted: 2022-12-19 | Resolved: 2025-08-13

## 2025-08-13 PROBLEM — M25.571 PAIN IN RIGHT ANKLE: Status: RESOLVED | Noted: 2022-12-19 | Resolved: 2025-08-13

## 2025-08-13 PROBLEM — Z87.440 HISTORY OF RECURRENT URINARY TRACT INFECTION: Status: RESOLVED | Noted: 2018-05-01 | Resolved: 2025-08-13

## 2025-08-13 PROCEDURE — 3078F DIAST BP <80 MM HG: CPT | Performed by: STUDENT IN AN ORGANIZED HEALTH CARE EDUCATION/TRAINING PROGRAM

## 2025-08-13 PROCEDURE — 99215 OFFICE O/P EST HI 40 MIN: CPT | Performed by: STUDENT IN AN ORGANIZED HEALTH CARE EDUCATION/TRAINING PROGRAM

## 2025-08-13 PROCEDURE — 3074F SYST BP LT 130 MM HG: CPT | Performed by: STUDENT IN AN ORGANIZED HEALTH CARE EDUCATION/TRAINING PROGRAM

## 2025-08-13 PROCEDURE — 99214 OFFICE O/P EST MOD 30 MIN: CPT | Performed by: STUDENT IN AN ORGANIZED HEALTH CARE EDUCATION/TRAINING PROGRAM

## 2025-08-13 RX ORDER — IBUPROFEN 800 MG/1
800 TABLET, FILM COATED ORAL
Qty: 30 TABLET | Refills: 0 | Status: SHIPPED | OUTPATIENT
Start: 2025-08-13

## 2025-08-13 ASSESSMENT — PATIENT HEALTH QUESTIONNAIRE - PHQ9: CLINICAL INTERPRETATION OF PHQ2 SCORE: 0

## 2025-08-13 ASSESSMENT — FIBROSIS 4 INDEX: FIB4 SCORE: 2.1

## 2025-08-15 ENCOUNTER — HOSPITAL ENCOUNTER (OUTPATIENT)
Dept: RADIOLOGY | Facility: MEDICAL CENTER | Age: 89
End: 2025-08-15
Attending: STUDENT IN AN ORGANIZED HEALTH CARE EDUCATION/TRAINING PROGRAM
Payer: MEDICARE

## 2025-08-15 ENCOUNTER — HOSPITAL ENCOUNTER (OUTPATIENT)
Dept: LAB | Facility: MEDICAL CENTER | Age: 89
End: 2025-08-15
Attending: STUDENT IN AN ORGANIZED HEALTH CARE EDUCATION/TRAINING PROGRAM
Payer: MEDICARE

## 2025-08-15 DIAGNOSIS — M15.0 PRIMARY OSTEOARTHRITIS INVOLVING MULTIPLE JOINTS: ICD-10-CM

## 2025-08-15 LAB
ALBUMIN SERPL BCP-MCNC: 3.9 G/DL (ref 3.2–4.9)
ALBUMIN/GLOB SERPL: 1.3 G/DL
ALP SERPL-CCNC: 61 U/L (ref 30–99)
ALT SERPL-CCNC: 15 U/L (ref 2–50)
ANION GAP SERPL CALC-SCNC: 10 MMOL/L (ref 7–16)
AST SERPL-CCNC: 20 U/L (ref 12–45)
BASOPHILS # BLD AUTO: 1 % (ref 0–1.8)
BASOPHILS # BLD: 0.06 K/UL (ref 0–0.12)
BILIRUB SERPL-MCNC: 0.3 MG/DL (ref 0.1–1.5)
BUN SERPL-MCNC: 21 MG/DL (ref 8–22)
CALCIUM ALBUM COR SERPL-MCNC: 9.5 MG/DL (ref 8.5–10.5)
CALCIUM SERPL-MCNC: 9.4 MG/DL (ref 8.5–10.5)
CHLORIDE SERPL-SCNC: 101 MMOL/L (ref 96–112)
CO2 SERPL-SCNC: 21 MMOL/L (ref 20–33)
CREAT SERPL-MCNC: 0.91 MG/DL (ref 0.5–1.4)
CRP SERPL HS-MCNC: 0.69 MG/DL (ref 0–0.75)
EOSINOPHIL # BLD AUTO: 0.12 K/UL (ref 0–0.51)
EOSINOPHIL NFR BLD: 2 % (ref 0–6.9)
ERYTHROCYTE [DISTWIDTH] IN BLOOD BY AUTOMATED COUNT: 47.4 FL (ref 35.9–50)
ERYTHROCYTE [SEDIMENTATION RATE] IN BLOOD BY WESTERGREN METHOD: 15 MM/HOUR (ref 0–25)
GFR SERPLBLD CREATININE-BSD FMLA CKD-EPI: 61 ML/MIN/1.73 M 2
GLOBULIN SER CALC-MCNC: 3 G/DL (ref 1.9–3.5)
GLUCOSE SERPL-MCNC: 105 MG/DL (ref 65–99)
HCT VFR BLD AUTO: 38.6 % (ref 37–47)
HGB BLD-MCNC: 12.8 G/DL (ref 12–16)
IMM GRANULOCYTES # BLD AUTO: 0.01 K/UL (ref 0–0.11)
IMM GRANULOCYTES NFR BLD AUTO: 0.2 % (ref 0–0.9)
LYMPHOCYTES # BLD AUTO: 1.99 K/UL (ref 1–4.8)
LYMPHOCYTES NFR BLD: 33.2 % (ref 22–41)
MCH RBC QN AUTO: 32.1 PG (ref 27–33)
MCHC RBC AUTO-ENTMCNC: 33.2 G/DL (ref 32.2–35.5)
MCV RBC AUTO: 96.7 FL (ref 81.4–97.8)
MONOCYTES # BLD AUTO: 0.5 K/UL (ref 0–0.85)
MONOCYTES NFR BLD AUTO: 8.3 % (ref 0–13.4)
NEUTROPHILS # BLD AUTO: 3.31 K/UL (ref 1.82–7.42)
NEUTROPHILS NFR BLD: 55.3 % (ref 44–72)
NRBC # BLD AUTO: 0 K/UL
NRBC BLD-RTO: 0 /100 WBC (ref 0–0.2)
PLATELET # BLD AUTO: 310 K/UL (ref 164–446)
PMV BLD AUTO: 10 FL (ref 9–12.9)
POTASSIUM SERPL-SCNC: 4.4 MMOL/L (ref 3.6–5.5)
PROT SERPL-MCNC: 6.9 G/DL (ref 6–8.2)
RBC # BLD AUTO: 3.99 M/UL (ref 4.2–5.4)
SODIUM SERPL-SCNC: 132 MMOL/L (ref 135–145)
WBC # BLD AUTO: 6 K/UL (ref 4.8–10.8)

## 2025-08-15 PROCEDURE — 80053 COMPREHEN METABOLIC PANEL: CPT

## 2025-08-15 PROCEDURE — 71046 X-RAY EXAM CHEST 2 VIEWS: CPT

## 2025-08-15 PROCEDURE — 85652 RBC SED RATE AUTOMATED: CPT

## 2025-08-15 PROCEDURE — 86140 C-REACTIVE PROTEIN: CPT

## 2025-08-15 PROCEDURE — 73521 X-RAY EXAM HIPS BI 2 VIEWS: CPT

## 2025-08-15 PROCEDURE — 85025 COMPLETE CBC W/AUTO DIFF WBC: CPT

## 2025-08-15 PROCEDURE — 36415 COLL VENOUS BLD VENIPUNCTURE: CPT

## (undated) DEVICE — GOWN WARMING STANDARD FLEX - (30/CA)

## (undated) DEVICE — SYRINGE SAFETY 3 ML 18 GA X 1 1/2 BLUNT LL (100/BX 8BX/CA)

## (undated) DEVICE — SUTURE GENERAL

## (undated) DEVICE — SET LEADWIRE 5 LEAD BEDSIDE DISPOSABLE ECG (1SET OF 5/EA)

## (undated) DEVICE — SUTURE 0 COATED VICRYL 6-18IN - (12PK/BX)

## (undated) DEVICE — CANISTER SUCTION 3000ML MECHANICAL FILTER AUTO SHUTOFF MEDI-VAC NONSTERILE LF DISP  (40EA/CA)

## (undated) DEVICE — GLOVE BIOGEL PI INDICATOR SZ 6.5 SURGICAL PF LF - (50/BX 4BX/CA)

## (undated) DEVICE — GLOVE BIOGEL INDICATOR SZ 7.5 SURGICAL PF LTX - (50PR/BX 4BX/CA)

## (undated) DEVICE — SENSOR SPO2 NEO LNCS ADHESIVE (20/BX) SEE USER NOTES

## (undated) DEVICE — CANNULA W/SEAL 5X100 Z-THRE - ADED KII (12/BX)

## (undated) DEVICE — GLOVE BIOGEL ECLIPSE PF LATEX SIZE 8.0  (50PR/BX)

## (undated) DEVICE — COVER LIGHT HANDLE ALC PLUS DISP (18EA/BX)

## (undated) DEVICE — GLOVE BIOGEL M SZ 8 SURGICAL PF LTX - (50/BX 4BX/CA)

## (undated) DEVICE — BAG RETRIEVAL 10ML (10EA/BX)

## (undated) DEVICE — SOD. CHL. INJ. 0.9% 1000 ML - (14EA/CA 60CA/PF)

## (undated) DEVICE — TUBING CLEARLINK DUO-VENT - C-FLO (48EA/CA)

## (undated) DEVICE — SUTURE 4-0 MONOCRYL PLUS PS-2 - 27 INCH (36/BX)

## (undated) DEVICE — GLOVE BIOGEL SZ 7 SURGICAL PF LTX - (50PR/BX 4BX/CA)

## (undated) DEVICE — LACTATED RINGERS INJ 1000 ML - (14EA/CA 60CA/PF)

## (undated) DEVICE — DRESSING 3X8 ADAPTIC GAUZE - NON-ADHERING (36/PK 6PK/BX)

## (undated) DEVICE — SET EXTENSION WITH 2 PORTS (48EA/CA) ***PART #2C8610 IS A SUBSTITUTE*****

## (undated) DEVICE — DRAPE LAPAROTOMY T SHEET - (12EA/CA)

## (undated) DEVICE — KIT ROOM DECONTAMINATION

## (undated) DEVICE — CANNULA O2 COMFORT SOFT EAR ADULT 7 FT TUBING (50/CA)

## (undated) DEVICE — SUTURE 3-0 ETHILON PS-1 (36PK/BX)

## (undated) DEVICE — Device

## (undated) DEVICE — ELECTRODE DUAL RETURN W/ CORD - (50/PK)

## (undated) DEVICE — MIDAS LUBRICATOR DIFFUSER PACK (4EA/CA)

## (undated) DEVICE — SYRINGE SAFETY 10 ML 18 GA X 1 1/2 BLUNT LL (100/BX 4BX/CA)

## (undated) DEVICE — PACK NEURO - (2EA/CA)

## (undated) DEVICE — GOWN SURGEONS X-LARGE - DISP. (30/CA)

## (undated) DEVICE — SET SUCTION/IRRIGATION WITH DISPOSABLE TIP (6/CA )PART #0250-070-520 IS A SUB

## (undated) DEVICE — CLIP MED LG INTNL HRZN TI ESCP - (20/BX)

## (undated) DEVICE — PACK LAP CHOLE OR - (2EA/CA)

## (undated) DEVICE — SPONGE GAUZESTER. 2X2 4-PL - (2/PK 50PK/BX 30BX/CS)

## (undated) DEVICE — NEEDLE FILTER ASPIRATION 18 GA X 1 1/2 IN (100EA/BX)

## (undated) DEVICE — PAD LAP STERILE 18 X 18 - (5/PK 40PK/CA)

## (undated) DEVICE — NEPTUNE 4 PORT MANIFOLD - (20/PK)

## (undated) DEVICE — SHEET BILATERAL 76X120 - (12/CA)

## (undated) DEVICE — MASK ANESTHESIA ADULT  - (100/CA)

## (undated) DEVICE — STOCKINETTE, TUBULAR 6

## (undated) DEVICE — PACK LOWER EXTREMITY - (2/CA)

## (undated) DEVICE — TUBING INSUFFLATION - (10/BX)

## (undated) DEVICE — SUCTION INSTRUMENT YANKAUER BULBOUS TIP W/O VENT (50EA/CA)

## (undated) DEVICE — TUBE CONNECT SUCTION CLEAR 120 X 1/4" (50EA/CA)"

## (undated) DEVICE — BLADE SURGICAL #15 - (50/BX 3BX/CA)

## (undated) DEVICE — HEAD HOLDER JUNIOR/ADULT

## (undated) DEVICE — SUTURE 3-0 VICRYL PLUS SH - 27 INCH (36/BX)

## (undated) DEVICE — CHLORAPREP 26 ML APPLICATOR - ORANGE TINT(25/CA)

## (undated) DEVICE — SUTURE 1 VICRYL PLUS CTX - 8 X 18 INCH (12/BX)

## (undated) DEVICE — PROTECTOR ULNA NERVE - (36PR/CA)

## (undated) DEVICE — KIT ANESTHESIA W/CIRCUIT & 3/LT BAG W/FILTER (20EA/CA)

## (undated) DEVICE — ELECTRODE 850 FOAM ADHESIVE - HYDROGEL RADIOTRNSPRNT (50/PK)

## (undated) DEVICE — PACK JACKSON TABLE KIT W/OUT - HR (6EA/CA)

## (undated) DEVICE — SODIUM CHL IRRIGATION 0.9% 1000ML (12EA/CA)

## (undated) DEVICE — BANDAGE ELASTIC 4 HONEYCOMB - 4"X5YD LF (20/CA)"

## (undated) DEVICE — DRAPE STRLE REG TOWEL 18X24 - (10/BX 4BX/CA)"

## (undated) DEVICE — SYRINGE ASEPTO - (50EA/CA

## (undated) DEVICE — GOWN SURGICAL XX-LARGE - (28EA/CA) SIRUS NON REINFORCED

## (undated) DEVICE — DRAPE CLEAR W/ELASTIC BAND RAD CARM 40 X40" (20EA/CA)"

## (undated) DEVICE — TOOL DISSECT MATCH HEAD

## (undated) DEVICE — DETERGENT RENUZYME PLUS 10 OZ PACKET (50/BX)

## (undated) DEVICE — GLOVE BIOGEL PI INDICATOR SZ 8.0 SURGICAL PF LF -(50/BX 4BX/CA)

## (undated) DEVICE — GLOVE BIOGEL SZ 6.5 SURGICAL PF LTX (50PR/BX 4BX/CA)

## (undated) DEVICE — TROCAR Z THREAD12MM OPTICAL - NON BLADED (6/BX)

## (undated) DEVICE — KIT  I.V. START (100EA/CA)

## (undated) DEVICE — NEEDLE INSFL 120MM 14GA VRRS - (20/BX)

## (undated) DEVICE — HEADREST PRONEVIEW LARGE - (10/CA)

## (undated) DEVICE — ARMREST CRADLE FOAM - (2PR/PK 12PR/CA)

## (undated) DEVICE — TROCAR 5X100 NON BLADED Z-TH - READ KII (6/BX)

## (undated) DEVICE — CORDS BIPOLAR COAGULATION - 12FT STERILE DISP. (10EA/BX)

## (undated) DEVICE — LACTATED RINGERS INJ. 500 ML - (24EA/CA)

## (undated) DEVICE — SENSOR OXIMETER ADULT SPO2 RD SET (20EA/BX)

## (undated) DEVICE — GLOVE SZ 6.5 BIOGEL PI MICRO - PF LF (50PR/BX)

## (undated) DEVICE — WATER IRRIG. STER. 1500 ML - (9/CA)

## (undated) DEVICE — KIT EVACUATER 3 SPRING PVC LF 1/8 DRAIN SIZE (10EA/CA)"

## (undated) DEVICE — MASK AIRWAY FLEXIBLE SINGLE-USE SIZE 4 ADULTS (10EA/BX)

## (undated) DEVICE — TRAY CATHETER FOLEY URINE METER W/STATLOCK 350ML (10EA/CA)

## (undated) DEVICE — BONE MILL BM210

## (undated) DEVICE — DRAPE LARGE 3 QUARTER - (20/CA)

## (undated) DEVICE — SUTURE 0 VICRYL PLUS CT-2 - 8 X 18 INCH (12/BX)

## (undated) DEVICE — SODIUM CHL. IRRIGATION 0.9% 3000ML (4EA/CA 65CA/PF)

## (undated) DEVICE — TOURNIQUET CUFF 24 X 4 ONE PORT - STERILE (10/BX)

## (undated) DEVICE — TOWEL STOP TIMEOUT SAFETY FLAG (40EA/CA)

## (undated) DEVICE — SYRINGE SAFETY 5 ML 18 GA X 1-1/2 BLUNT LL (100/BX 4BX/CA)

## (undated) DEVICE — GLOVE BIOGEL SZ 8.5 SURGICAL PF LTX - (50PR/BX 4BX/CA)

## (undated) DEVICE — MASK, LARYNGEAL AIRWAY #4

## (undated) DEVICE — BOVIE BLADE COATED &INSULATED - 25/PK

## (undated) DEVICE — KIT SURGIFLO W/OUT THROMBIN - (6EA/CA)

## (undated) DEVICE — MASK OXYGEN VNYL ADLT MED CONC WITH 7 FOOT TUBING  - (50EA/CA)

## (undated) DEVICE — SUTURE 4-0 VICRYL PLUS FS-2 - 27 INCH (36/BX)

## (undated) DEVICE — DRESSING,POST OP BORDER 4INX6IN AG

## (undated) DEVICE — SUTURE 0 VICRYL PLUS CT-2 - 27 INCH (36/BX)

## (undated) DEVICE — SLEEVE, VASO, THIGH, MED

## (undated) DEVICE — PADDING CAST 4 IN STERILE - 4 X 4 YDS (24/CA)

## (undated) DEVICE — SLEEVE VASO CALF MED - (10PR/CA)

## (undated) DEVICE — GLOVE BIOGEL INDICATOR SZ 6.5 SURGICAL PF LTX - (50PR/BX 4BX/CA)

## (undated) DEVICE — GLOVE BIOGEL PI ORTHO SZ 6 1/2 SURGICAL PF LF (40PR/BX)

## (undated) DEVICE — DERMABOND ADVANCED - (12EA/BX)

## (undated) DEVICE — DRESSING TRANSPARENT FILM TEGADERM 2.375 X 2.75"  (100EA/BX)"

## (undated) DEVICE — DRESSING TRANSPARENT FILM TEGADERM 4 X 4.75" (50EA/BX)"